# Patient Record
Sex: MALE | Race: WHITE | NOT HISPANIC OR LATINO | URBAN - METROPOLITAN AREA
[De-identification: names, ages, dates, MRNs, and addresses within clinical notes are randomized per-mention and may not be internally consistent; named-entity substitution may affect disease eponyms.]

---

## 2018-12-17 ENCOUNTER — INPATIENT (INPATIENT)
Facility: HOSPITAL | Age: 68
LOS: 2 days | Discharge: HOME | DRG: 554 | End: 2018-12-20
Attending: STUDENT IN AN ORGANIZED HEALTH CARE EDUCATION/TRAINING PROGRAM | Admitting: STUDENT IN AN ORGANIZED HEALTH CARE EDUCATION/TRAINING PROGRAM
Payer: MEDICAID

## 2018-12-17 VITALS
HEIGHT: 68 IN | RESPIRATION RATE: 20 BRPM | SYSTOLIC BLOOD PRESSURE: 160 MMHG | TEMPERATURE: 98 F | HEART RATE: 97 BPM | OXYGEN SATURATION: 99 % | WEIGHT: 201.06 LBS | DIASTOLIC BLOOD PRESSURE: 90 MMHG

## 2018-12-18 DIAGNOSIS — M10.9 GOUT, UNSPECIFIED: ICD-10-CM

## 2018-12-18 DIAGNOSIS — I10 ESSENTIAL (PRIMARY) HYPERTENSION: ICD-10-CM

## 2018-12-18 DIAGNOSIS — Z29.9 ENCOUNTER FOR PROPHYLACTIC MEASURES, UNSPECIFIED: ICD-10-CM

## 2018-12-18 DIAGNOSIS — E78.5 HYPERLIPIDEMIA, UNSPECIFIED: ICD-10-CM

## 2018-12-18 DIAGNOSIS — I25.10 ATHEROSCLEROTIC HEART DISEASE OF NATIVE CORONARY ARTERY WITHOUT ANGINA PECTORIS: ICD-10-CM

## 2018-12-18 LAB
24R-OH-CALCIDIOL SERPL-MCNC: 17 NG/ML — LOW (ref 30–80)
ALBUMIN SERPL ELPH-MCNC: 3.3 G/DL — LOW (ref 3.5–5)
ALBUMIN SERPL ELPH-MCNC: 3.5 G/DL — SIGNIFICANT CHANGE UP (ref 3.5–5)
ALP SERPL-CCNC: 84 U/L — SIGNIFICANT CHANGE UP (ref 40–120)
ALP SERPL-CCNC: 85 U/L — SIGNIFICANT CHANGE UP (ref 40–120)
ALT FLD-CCNC: 18 U/L DA — SIGNIFICANT CHANGE UP (ref 10–60)
ALT FLD-CCNC: 19 U/L DA — SIGNIFICANT CHANGE UP (ref 10–60)
ANION GAP SERPL CALC-SCNC: 8 MMOL/L — SIGNIFICANT CHANGE UP (ref 5–17)
ANION GAP SERPL CALC-SCNC: 9 MMOL/L — SIGNIFICANT CHANGE UP (ref 5–17)
AST SERPL-CCNC: 12 U/L — SIGNIFICANT CHANGE UP (ref 10–40)
AST SERPL-CCNC: 8 U/L — LOW (ref 10–40)
BASOPHILS # BLD AUTO: 0 K/UL — SIGNIFICANT CHANGE UP (ref 0–0.2)
BASOPHILS # BLD AUTO: 0 K/UL — SIGNIFICANT CHANGE UP (ref 0–0.2)
BASOPHILS NFR BLD AUTO: 0.3 % — SIGNIFICANT CHANGE UP (ref 0–2)
BASOPHILS NFR BLD AUTO: 0.4 % — SIGNIFICANT CHANGE UP (ref 0–2)
BILIRUB SERPL-MCNC: 2.1 MG/DL — HIGH (ref 0.2–1.2)
BILIRUB SERPL-MCNC: 2.3 MG/DL — HIGH (ref 0.2–1.2)
BUN SERPL-MCNC: 17 MG/DL — SIGNIFICANT CHANGE UP (ref 7–18)
BUN SERPL-MCNC: 19 MG/DL — HIGH (ref 7–18)
CALCIUM SERPL-MCNC: 8.7 MG/DL — SIGNIFICANT CHANGE UP (ref 8.4–10.5)
CALCIUM SERPL-MCNC: 9 MG/DL — SIGNIFICANT CHANGE UP (ref 8.4–10.5)
CHLORIDE SERPL-SCNC: 103 MMOL/L — SIGNIFICANT CHANGE UP (ref 96–108)
CHLORIDE SERPL-SCNC: 104 MMOL/L — SIGNIFICANT CHANGE UP (ref 96–108)
CHOLEST SERPL-MCNC: 153 MG/DL — SIGNIFICANT CHANGE UP (ref 10–199)
CO2 SERPL-SCNC: 24 MMOL/L — SIGNIFICANT CHANGE UP (ref 22–31)
CO2 SERPL-SCNC: 25 MMOL/L — SIGNIFICANT CHANGE UP (ref 22–31)
CREAT SERPL-MCNC: 1.04 MG/DL — SIGNIFICANT CHANGE UP (ref 0.5–1.3)
CREAT SERPL-MCNC: 1.05 MG/DL — SIGNIFICANT CHANGE UP (ref 0.5–1.3)
EOSINOPHIL # BLD AUTO: 0 K/UL — SIGNIFICANT CHANGE UP (ref 0–0.5)
EOSINOPHIL # BLD AUTO: 0.1 K/UL — SIGNIFICANT CHANGE UP (ref 0–0.5)
EOSINOPHIL NFR BLD AUTO: 0.4 % — SIGNIFICANT CHANGE UP (ref 0–6)
EOSINOPHIL NFR BLD AUTO: 0.7 % — SIGNIFICANT CHANGE UP (ref 0–6)
GLUCOSE SERPL-MCNC: 115 MG/DL — HIGH (ref 70–99)
GLUCOSE SERPL-MCNC: 126 MG/DL — HIGH (ref 70–99)
HBA1C BLD-MCNC: 5.4 % — SIGNIFICANT CHANGE UP (ref 4–5.6)
HCT VFR BLD CALC: 37.5 % — LOW (ref 39–50)
HCT VFR BLD CALC: 38 % — LOW (ref 39–50)
HDLC SERPL-MCNC: 50 MG/DL — SIGNIFICANT CHANGE UP
HGB BLD-MCNC: 12.3 G/DL — LOW (ref 13–17)
HGB BLD-MCNC: 12.5 G/DL — LOW (ref 13–17)
LIPID PNL WITH DIRECT LDL SERPL: 78 MG/DL — SIGNIFICANT CHANGE UP
LYMPHOCYTES # BLD AUTO: 0.8 K/UL — LOW (ref 1–3.3)
LYMPHOCYTES # BLD AUTO: 1.2 K/UL — SIGNIFICANT CHANGE UP (ref 1–3.3)
LYMPHOCYTES # BLD AUTO: 10.2 % — LOW (ref 13–44)
LYMPHOCYTES # BLD AUTO: 7.8 % — LOW (ref 13–44)
MAGNESIUM SERPL-MCNC: 2.4 MG/DL — SIGNIFICANT CHANGE UP (ref 1.6–2.6)
MCHC RBC-ENTMCNC: 26.6 PG — LOW (ref 27–34)
MCHC RBC-ENTMCNC: 26.9 PG — LOW (ref 27–34)
MCHC RBC-ENTMCNC: 32.7 GM/DL — SIGNIFICANT CHANGE UP (ref 32–36)
MCHC RBC-ENTMCNC: 32.9 GM/DL — SIGNIFICANT CHANGE UP (ref 32–36)
MCV RBC AUTO: 81.4 FL — SIGNIFICANT CHANGE UP (ref 80–100)
MCV RBC AUTO: 81.7 FL — SIGNIFICANT CHANGE UP (ref 80–100)
MONOCYTES # BLD AUTO: 0.5 K/UL — SIGNIFICANT CHANGE UP (ref 0–0.9)
MONOCYTES # BLD AUTO: 0.9 K/UL — SIGNIFICANT CHANGE UP (ref 0–0.9)
MONOCYTES NFR BLD AUTO: 5.2 % — SIGNIFICANT CHANGE UP (ref 2–14)
MONOCYTES NFR BLD AUTO: 7 % — SIGNIFICANT CHANGE UP (ref 2–14)
NEUTROPHILS # BLD AUTO: 10 K/UL — HIGH (ref 1.8–7.4)
NEUTROPHILS # BLD AUTO: 8.6 K/UL — HIGH (ref 1.8–7.4)
NEUTROPHILS NFR BLD AUTO: 81.7 % — HIGH (ref 43–77)
NEUTROPHILS NFR BLD AUTO: 86.3 % — HIGH (ref 43–77)
PHOSPHATE SERPL-MCNC: 4.9 MG/DL — HIGH (ref 2.5–4.5)
PLATELET # BLD AUTO: 303 K/UL — SIGNIFICANT CHANGE UP (ref 150–400)
PLATELET # BLD AUTO: 328 K/UL — SIGNIFICANT CHANGE UP (ref 150–400)
POTASSIUM SERPL-MCNC: 4 MMOL/L — SIGNIFICANT CHANGE UP (ref 3.5–5.3)
POTASSIUM SERPL-MCNC: 4.1 MMOL/L — SIGNIFICANT CHANGE UP (ref 3.5–5.3)
POTASSIUM SERPL-SCNC: 4 MMOL/L — SIGNIFICANT CHANGE UP (ref 3.5–5.3)
POTASSIUM SERPL-SCNC: 4.1 MMOL/L — SIGNIFICANT CHANGE UP (ref 3.5–5.3)
PROT SERPL-MCNC: 7.8 G/DL — SIGNIFICANT CHANGE UP (ref 6–8.3)
PROT SERPL-MCNC: 8.2 G/DL — SIGNIFICANT CHANGE UP (ref 6–8.3)
RBC # BLD: 4.62 M/UL — SIGNIFICANT CHANGE UP (ref 4.2–5.8)
RBC # BLD: 4.65 M/UL — SIGNIFICANT CHANGE UP (ref 4.2–5.8)
RBC # FLD: 14 % — SIGNIFICANT CHANGE UP (ref 10.3–14.5)
RBC # FLD: 14.1 % — SIGNIFICANT CHANGE UP (ref 10.3–14.5)
SODIUM SERPL-SCNC: 136 MMOL/L — SIGNIFICANT CHANGE UP (ref 135–145)
SODIUM SERPL-SCNC: 137 MMOL/L — SIGNIFICANT CHANGE UP (ref 135–145)
TOTAL CHOLESTEROL/HDL RATIO MEASUREMENT: 3.1 RATIO — LOW (ref 3.4–9.6)
TRIGL SERPL-MCNC: 123 MG/DL — SIGNIFICANT CHANGE UP (ref 10–149)
TSH SERPL-MCNC: 3.39 UU/ML — SIGNIFICANT CHANGE UP (ref 0.34–4.82)
URATE SERPL-MCNC: 9 MG/DL — HIGH (ref 3.4–8.8)
VIT B12 SERPL-MCNC: 463 PG/ML — SIGNIFICANT CHANGE UP (ref 232–1245)
WBC # BLD: 10 K/UL — SIGNIFICANT CHANGE UP (ref 3.8–10.5)
WBC # BLD: 12.3 K/UL — HIGH (ref 3.8–10.5)
WBC # FLD AUTO: 10 K/UL — SIGNIFICANT CHANGE UP (ref 3.8–10.5)
WBC # FLD AUTO: 12.3 K/UL — HIGH (ref 3.8–10.5)

## 2018-12-18 PROCEDURE — 99223 1ST HOSP IP/OBS HIGH 75: CPT | Mod: GC

## 2018-12-18 PROCEDURE — 73562 X-RAY EXAM OF KNEE 3: CPT | Mod: 26,LT

## 2018-12-18 PROCEDURE — 73620 X-RAY EXAM OF FOOT: CPT | Mod: 26,LT

## 2018-12-18 PROCEDURE — 99285 EMERGENCY DEPT VISIT HI MDM: CPT | Mod: 25

## 2018-12-18 RX ORDER — LIDOCAINE HCL 20 MG/ML
5 VIAL (ML) INJECTION ONCE
Qty: 0 | Refills: 0 | Status: COMPLETED | OUTPATIENT
Start: 2018-12-18 | End: 2018-12-18

## 2018-12-18 RX ORDER — SODIUM CHLORIDE 9 MG/ML
3 INJECTION INTRAMUSCULAR; INTRAVENOUS; SUBCUTANEOUS ONCE
Qty: 0 | Refills: 0 | Status: COMPLETED | OUTPATIENT
Start: 2018-12-18 | End: 2018-12-18

## 2018-12-18 RX ORDER — ASPIRIN/CALCIUM CARB/MAGNESIUM 324 MG
81 TABLET ORAL DAILY
Qty: 0 | Refills: 0 | Status: DISCONTINUED | OUTPATIENT
Start: 2018-12-18 | End: 2018-12-20

## 2018-12-18 RX ORDER — COLCHICINE 0.6 MG
0.6 TABLET ORAL ONCE
Qty: 0 | Refills: 0 | Status: COMPLETED | OUTPATIENT
Start: 2018-12-18 | End: 2018-12-18

## 2018-12-18 RX ORDER — MORPHINE SULFATE 50 MG/1
4 CAPSULE, EXTENDED RELEASE ORAL ONCE
Qty: 0 | Refills: 0 | Status: DISCONTINUED | OUTPATIENT
Start: 2018-12-18 | End: 2018-12-18

## 2018-12-18 RX ORDER — ATORVASTATIN CALCIUM 80 MG/1
40 TABLET, FILM COATED ORAL AT BEDTIME
Qty: 0 | Refills: 0 | Status: DISCONTINUED | OUTPATIENT
Start: 2018-12-18 | End: 2018-12-20

## 2018-12-18 RX ORDER — ENOXAPARIN SODIUM 100 MG/ML
40 INJECTION SUBCUTANEOUS DAILY
Qty: 0 | Refills: 0 | Status: DISCONTINUED | OUTPATIENT
Start: 2018-12-18 | End: 2018-12-20

## 2018-12-18 RX ORDER — COLCHICINE 0.6 MG
0.6 TABLET ORAL EVERY 12 HOURS
Qty: 0 | Refills: 0 | Status: DISCONTINUED | OUTPATIENT
Start: 2018-12-19 | End: 2018-12-20

## 2018-12-18 RX ORDER — TRAMADOL HYDROCHLORIDE 50 MG/1
50 TABLET ORAL ONCE
Qty: 0 | Refills: 0 | Status: DISCONTINUED | OUTPATIENT
Start: 2018-12-18 | End: 2018-12-18

## 2018-12-18 RX ORDER — KETOROLAC TROMETHAMINE 30 MG/ML
15 SYRINGE (ML) INJECTION ONCE
Qty: 0 | Refills: 0 | Status: DISCONTINUED | OUTPATIENT
Start: 2018-12-18 | End: 2018-12-18

## 2018-12-18 RX ORDER — COLCHICINE 0.6 MG
1.2 TABLET ORAL ONCE
Qty: 0 | Refills: 0 | Status: COMPLETED | OUTPATIENT
Start: 2018-12-18 | End: 2018-12-18

## 2018-12-18 RX ADMIN — Medication 5 MILLILITER(S): at 02:53

## 2018-12-18 RX ADMIN — Medication 15 MILLIGRAM(S): at 04:52

## 2018-12-18 RX ADMIN — Medication 0.6 MILLIGRAM(S): at 16:53

## 2018-12-18 RX ADMIN — ATORVASTATIN CALCIUM 40 MILLIGRAM(S): 80 TABLET, FILM COATED ORAL at 21:47

## 2018-12-18 RX ADMIN — Medication 40 MILLIGRAM(S): at 05:09

## 2018-12-18 RX ADMIN — SODIUM CHLORIDE 3 MILLILITER(S): 9 INJECTION INTRAMUSCULAR; INTRAVENOUS; SUBCUTANEOUS at 02:20

## 2018-12-18 RX ADMIN — TRAMADOL HYDROCHLORIDE 50 MILLIGRAM(S): 50 TABLET ORAL at 02:21

## 2018-12-18 RX ADMIN — TRAMADOL HYDROCHLORIDE 50 MILLIGRAM(S): 50 TABLET ORAL at 04:52

## 2018-12-18 RX ADMIN — MORPHINE SULFATE 4 MILLIGRAM(S): 50 CAPSULE, EXTENDED RELEASE ORAL at 05:09

## 2018-12-18 RX ADMIN — Medication 1.2 MILLIGRAM(S): at 15:26

## 2018-12-18 RX ADMIN — Medication 40 MILLIGRAM(S): at 14:53

## 2018-12-18 RX ADMIN — MORPHINE SULFATE 4 MILLIGRAM(S): 50 CAPSULE, EXTENDED RELEASE ORAL at 06:24

## 2018-12-18 RX ADMIN — Medication 15 MILLIGRAM(S): at 02:21

## 2018-12-18 NOTE — ED PROVIDER NOTE - OBJECTIVE STATEMENT
67 y/o M w/ PMHx of HTN, HLD, gout presents to ED c/o 2 weeks of L knee and L foot pain, associated w/ swelling. Pt notes he ran out of Colchicine 2 weeks ago and pain has gotten worse since, prompting ED visit. Pt denies any trauma.

## 2018-12-18 NOTE — H&P ADULT - NSHPLABSRESULTS_GEN_ALL_CORE
12.5   10.0  )-----------( 303      ( 18 Dec 2018 10:34 )             38.0       12-18    137  |  103  |  19<H>  ----------------------------<  126<H>  4.1   |  25  |  1.04    Ca    9.0      18 Dec 2018 10:34  Phos  4.9     12-18  Mg     2.4     12-18    TPro  8.2  /  Alb  3.5  /  TBili  2.1<H>  /  DBili  x   /  AST  8<L>  /  ALT  18  /  AlkPhos  84  12-18      < from: Xray Foot AP + Lateral, Left (12.18.18 @ 03:32) >      AP and lateral radiographs of the left foot were obtained.    There are degenerative changes about the talonavicular joint with   subchondral irregularity and osteophyte formation.    There is no fracture, subluxation or dislocation. No lytic or blastic   lesions are identified.    Thereis no periosteal reaction or cortical disruption to suggest   osteomyelitis.    There is diffuse osteopenia.    Diffuse soft tissue swelling is present.    IMPRESSION:    Osteoarthritis of the hindfoot. Diffuse soft tissue swelling.    Osteopenia.    < end of copied text >      < from: Xray Knee 3 Views, Left (12.18.18 @ 03:31) >    Indication: Left knee pain and swelling.    3 views of the left knee are acquired without a previous examination for   comparison.    Impression: No evidence for an acute fracture or dislocation.    The joint spaces are preserved.    The osseous mineralization is within normal limits.     Mild osteoarthritis at the patellofemoral joint.    Small suprapatellar joint effusion.    Enthesophyte at the superior patella.    < end of copied text >

## 2018-12-18 NOTE — ED PROVIDER NOTE - PROGRESS NOTE DETAILS
labs show wbc 12K, uric acid 9, Xrs show no acute fractures.  Discussed above with patient using Micron[place]  ID#[id number], on reeval pt reports pain persistent and still unable to bear weight on it. Will admit for pain management and ortho evaluation.

## 2018-12-18 NOTE — H&P ADULT - ATTENDING COMMENTS
Agree with above   Patient seen and examined in ED together with Dr. Thompson PGY2. Patient is [place] speaking, I spoke to him in [place]   Patient is  a 68 male. recently moved to [place] from [place],  with PMH of CAD, HTN , HLD , Gout  comes in with complaint of Left knee pain and swelling for 10 days. Patient basically had swelling and pain involving knees and foot of both legs , but symptoms were primarily in Left knee. Patient has a h/o Gout and is on colchicine for years , however ran out of medication about 2 weeks ago. Denies any fever , chills , nausea , vomiting , diarrhea , headache or any other ROS.     ROS: pain and swelling of right knee   PE:   General: laying in bed, in mild distress secondary to pain   Cardio: regular rate and rhythm   Pulm: clear breat sounds   GI: abdomen is soft   Extr: right knee is swollen and tender to touch, b/l LE edema   Neuro: AOx3, no focal weakness     Vital Signs Last 24 Hrs  T(C): 36.6 (18 Dec 2018 16:29), Max: 36.8 (17 Dec 2018 22:46)  T(F): 97.8 (18 Dec 2018 16:29), Max: 98.3 (17 Dec 2018 22:46)  HR: 82 (18 Dec 2018 16:29) (74 - 97)  BP: 152/70 (18 Dec 2018 16:29) (130/64 - 160/90)  BP(mean): --  RR: 18 (18 Dec 2018 16:29) (18 - 20)  SpO2: 97% (18 Dec 2018 16:29) (97% - 100%)    1. Acute gouty attack   2. CAD  3. HTN  4. HLD  5. DVT prophylaxis     1. Restarted Colchiceine; give one dose of 1.2 mg po followed by 0.6 mg po twice daily   s/p Arthrocentesis in ED. IF results of fluid analysis confirms dx. of gout, will give intraarticular steroids.   2. Start Aspirin and statin: Atorvastatin 40 mg po daily   3. BP currently controlled: c/w monitoring, if SBP >140, can start Lisinopril 5 mg po daily   4. DVT prophylaxis     Plan of care discussed with patient in detail. Agree with above   Patient seen and examined in ED together with Dr. Thompson PGY2. Patient is [place] speaking, I spoke to him in [place]   Patient is  a 68 male. recently moved to [place] from [place],  with PMH of CAD, HTN , HLD , Gout  comes in with complaint of Left knee pain and swelling for 10 days. Patient basically had swelling and pain involving knees and foot of both legs , but symptoms were primarily in Left knee. Patient has a h/o Gout and is on colchicine for years , however ran out of medication about 2 weeks ago. Denies any fever , chills , nausea , vomiting , diarrhea , headache or any other ROS.     ROS: pain and swelling of right knee   PE:   General: laying in bed, in mild distress secondary to pain   Cardio: regular rate and rhythm   Pulm: clear breat sounds   GI: abdomen is soft   Extr: right knee is swollen and tender to touch, b/l LE edema   Neuro: AOx3, no focal weakness     Vital Signs Last 24 Hrs  T(C): 36.6 (18 Dec 2018 16:29), Max: 36.8 (17 Dec 2018 22:46)  T(F): 97.8 (18 Dec 2018 16:29), Max: 98.3 (17 Dec 2018 22:46)  HR: 82 (18 Dec 2018 16:29) (74 - 97)  BP: 152/70 (18 Dec 2018 16:29) (130/64 - 160/90)  BP(mean): --  RR: 18 (18 Dec 2018 16:29) (18 - 20)  SpO2: 97% (18 Dec 2018 16:29) (97% - 100%)    1. Acute gouty attack   2. CAD  3. HTN  4. HLD  5. DVT prophylaxis     1. Restarted Colchiceine; give one dose of 1.2 mg po followed by 0.6 mg po twice daily   s/p Arthrocentesis in ED. IF results of fluid analysis confirms dx. of gout, will give intraarticular steroids.   2. Start Aspirin and statin: Atorvastatin 40 mg po daily   3. BP currently controlled: c/w monitoring, if SBP >140, can start Lisinopril 5 mg po daily   4. DVT prophylaxis     Plan of care discussed with patient in detail.      Addendum 12/19/18.   The left knee is swollen, not right

## 2018-12-18 NOTE — H&P ADULT - PROBLEM SELECTOR PLAN 2
Not on meds at home   c/w dash diet   Monitor BP Not on meds at home , takes prn meds  c/w dash diet   Monitor BP , if elevated consider Ace/ arb

## 2018-12-18 NOTE — H&P ADULT - ASSESSMENT
68 male with PMH of HTN , HLD , Gout , CAD , comes in with complaint of Left knee pain and swelling for 10 days.   Will admit to medicine for Gout.

## 2018-12-18 NOTE — ED ADULT NURSE NOTE - NSIMPLEMENTINTERV_GEN_ALL_ED
Implemented All Universal Safety Interventions:  [place] to call system. Call bell, personal items and telephone within reach. Instruct patient to call for assistance. Room bathroom lighting operational. Non-slip footwear when patient is off stretcher. Physically safe environment: no spills, clutter or unnecessary equipment. Stretcher in lowest position, wheels locked, appropriate side rails in place.

## 2018-12-18 NOTE — CONSULT NOTE ADULT - SUBJECTIVE AND OBJECTIVE BOX
HPI: Patient is a 69 y/o M with history of Gout who presents to ED c/o 2 weeks of L knee and L foot pain, associated w/ swelling. Pt notes he ran out of Colchicine 2 weeks ago and pain has gotten worse since, prompting ED visit.    PAST MEDICAL & SURGICAL HISTORY:  No pertinent past medical history  No significant past surgical history    Review of systems: Non Contributory    MEDICATIONS  (STANDING):    Allergies: No known Allergies    Vital Signs Last 24 Hrs  T(C): 36.3 (18 Dec 2018 08:15), Max: 36.8 (17 Dec 2018 22:46)  T(F): 97.4 (18 Dec 2018 08:15), Max: 98.3 (17 Dec 2018 22:46)  HR: 80 (18 Dec 2018 08:15) (80 - 97)  BP: 144/72 (18 Dec 2018 08:15) (130/64 - 160/90)  BP(mean): --  RR: 18 (18 Dec 2018 08:15) (18 - 20)  SpO2: 98% (18 Dec 2018 08:15) (98% - 99%)    Physical Examination:    Musculoskeletal: Left knee: Positive tenderness to palpation of joint line, mild effusion, pain on flexion of the knee, decreased range of motion, positive [facility] maneuver.      Left foot: mild diffuse erythema, swelling, pain to palpation.     Neurovascularly Intact    LABS:                        12.3   12.3  )-----------( 328      ( 18 Dec 2018 03:03 )             37.5     12-18    136  |  104  |  17  ----------------------------<  115<H>  4.0   |  24  |  1.05    Ca    8.7      18 Dec 2018 03:03    TPro  7.8  /  Alb  3.3<L>  /  TBili  2.3<H>  /  DBili  x   /  AST  12  /  ALT  19  /  AlkPhos  85  12-18    RADIOLOGY & ADDITIONAL STUDIES: X-ray of Left knee done in the ER is unremarkable.    X ray of left foot done in er shows degenerative changes. ,    ASSESSMENT: Left foot and left knee pain, history of Gout.    PLAN/RECOMMENDATION: Under sterile condition, left knee was injected with 40 mg of Depomedrol and 5 CC of lidocaine. Patient tolerated procedure well and expressed relief of pain.     Medical treatment for gout.     Apply ice. Take anti-inflammatory medication. Apply Voltaren gel.     FOLLOW UP: With office in after discharge

## 2018-12-18 NOTE — H&P ADULT - PROBLEM SELECTOR PLAN 1
Left knee pain and swelling mostly consistent with gouty arthritis based on symptoms   s/p one dose of ketorolac Left knee pain and swelling mostly consistent with gouty arthritis based on symptoms   s/p one dose of ketorolac , prednisone in Ed  s/p Arthrocentesis in Ed by Ortho , Dr. Alas   will f/u fluid studies  will treat for gout with colchicine   NSAIDs for pain control as needed   If confirmed gout on fluid study can consider intraarticular steroid   PT   Dvt ppx

## 2018-12-18 NOTE — H&P ADULT - NSHPPHYSICALEXAM_GEN_ALL_CORE
PHYSICAL EXAM:  GENERAL: NAD  HEENT: Normocephalic;  conjunctivae and sclerae clear; moist mucous membranes;   NECK : supple  CHEST/LUNG: Clear to auscultation bilaterally with good air entry   HEART: S1 S2  regular; no murmurs, gallops or rubs  ABDOMEN: Soft, Nontender, Nondistended; Bowel sounds present  EXTREMITIES: Left knee swollen , mild tenderness ; swelling in Left foot as well ; Right sided joints WNL  SKIN: warm and dry; no rash  NERVOUS SYSTEM:  Awake and alert; Oriented  to place, person and time ; no new deficits

## 2018-12-18 NOTE — H&P ADULT - HISTORY OF PRESENT ILLNESS
68 male with PMH of HTN , HLD , Gout , CAD , comes in with complaint of Left knee pain and swelling for 10 days. Patient basically had swelling and pain involving knees and foot of both legs , but symptoms were primarily in Left knee. Patient has a h/o Gout and is on colchicine for years , however ran out of medication about 2 weeks ago. Denies any fever , chills , nausea , vomiting , diarrhea , headache or any other ROS.     In Ed , BP : 160/90 mm hg , Hr: 97 , Temp : 98.3 F   Cbc shows white count of 12 with left shift that normalized to 10   Bmp WNL  Uric acid 9   Xray shows OA , with pre-patellar and soft tissue swelling ; no fractures

## 2018-12-18 NOTE — ED PROVIDER NOTE - MEDICAL DECISION MAKING DETAILS
69 y/o M w/ h/o gout here w/ L knee swelling and pain. Likely gouty flare. Will obtain x-rays, given tramadol and Toradol, will re-assess.

## 2018-12-18 NOTE — H&P ADULT - PROBLEM SELECTOR PLAN 4
c/w home medications Daughter to bring in home medications , primary team to follow  Will start on asa, statin for now

## 2018-12-19 ENCOUNTER — TRANSCRIPTION ENCOUNTER (OUTPATIENT)
Age: 68
End: 2018-12-19

## 2018-12-19 LAB
ALBUMIN SERPL ELPH-MCNC: 3.2 G/DL — LOW (ref 3.5–5)
ALP SERPL-CCNC: 82 U/L — SIGNIFICANT CHANGE UP (ref 40–120)
ALT FLD-CCNC: 19 U/L DA — SIGNIFICANT CHANGE UP (ref 10–60)
ANION GAP SERPL CALC-SCNC: 10 MMOL/L — SIGNIFICANT CHANGE UP (ref 5–17)
AST SERPL-CCNC: 9 U/L — LOW (ref 10–40)
BASOPHILS # BLD AUTO: 0 K/UL — SIGNIFICANT CHANGE UP (ref 0–0.2)
BASOPHILS NFR BLD AUTO: 0.2 % — SIGNIFICANT CHANGE UP (ref 0–2)
BILIRUB SERPL-MCNC: 1.3 MG/DL — HIGH (ref 0.2–1.2)
BUN SERPL-MCNC: 25 MG/DL — HIGH (ref 7–18)
CALCIUM SERPL-MCNC: 9.1 MG/DL — SIGNIFICANT CHANGE UP (ref 8.4–10.5)
CHLORIDE SERPL-SCNC: 105 MMOL/L — SIGNIFICANT CHANGE UP (ref 96–108)
CO2 SERPL-SCNC: 25 MMOL/L — SIGNIFICANT CHANGE UP (ref 22–31)
CREAT SERPL-MCNC: 0.98 MG/DL — SIGNIFICANT CHANGE UP (ref 0.5–1.3)
EOSINOPHIL # BLD AUTO: 0 K/UL — SIGNIFICANT CHANGE UP (ref 0–0.5)
EOSINOPHIL NFR BLD AUTO: 0.1 % — SIGNIFICANT CHANGE UP (ref 0–6)
GLUCOSE SERPL-MCNC: 131 MG/DL — HIGH (ref 70–99)
HCT VFR BLD CALC: 39.2 % — SIGNIFICANT CHANGE UP (ref 39–50)
HGB BLD-MCNC: 12.5 G/DL — LOW (ref 13–17)
LYMPHOCYTES # BLD AUTO: 1.1 K/UL — SIGNIFICANT CHANGE UP (ref 1–3.3)
LYMPHOCYTES # BLD AUTO: 9.5 % — LOW (ref 13–44)
MAGNESIUM SERPL-MCNC: 2.6 MG/DL — SIGNIFICANT CHANGE UP (ref 1.6–2.6)
MCHC RBC-ENTMCNC: 26.4 PG — LOW (ref 27–34)
MCHC RBC-ENTMCNC: 31.9 GM/DL — LOW (ref 32–36)
MCV RBC AUTO: 82.7 FL — SIGNIFICANT CHANGE UP (ref 80–100)
MONOCYTES # BLD AUTO: 0.9 K/UL — SIGNIFICANT CHANGE UP (ref 0–0.9)
MONOCYTES NFR BLD AUTO: 7.2 % — SIGNIFICANT CHANGE UP (ref 2–14)
NEUTROPHILS # BLD AUTO: 10 K/UL — HIGH (ref 1.8–7.4)
NEUTROPHILS NFR BLD AUTO: 82.9 % — HIGH (ref 43–77)
PHOSPHATE SERPL-MCNC: 4.6 MG/DL — HIGH (ref 2.5–4.5)
PLATELET # BLD AUTO: 399 K/UL — SIGNIFICANT CHANGE UP (ref 150–400)
POTASSIUM SERPL-MCNC: 4.2 MMOL/L — SIGNIFICANT CHANGE UP (ref 3.5–5.3)
POTASSIUM SERPL-SCNC: 4.2 MMOL/L — SIGNIFICANT CHANGE UP (ref 3.5–5.3)
PROT SERPL-MCNC: 8 G/DL — SIGNIFICANT CHANGE UP (ref 6–8.3)
RBC # BLD: 4.74 M/UL — SIGNIFICANT CHANGE UP (ref 4.2–5.8)
RBC # FLD: 13.9 % — SIGNIFICANT CHANGE UP (ref 10.3–14.5)
SODIUM SERPL-SCNC: 140 MMOL/L — SIGNIFICANT CHANGE UP (ref 135–145)
WBC # BLD: 12.1 K/UL — HIGH (ref 3.8–10.5)
WBC # FLD AUTO: 12.1 K/UL — HIGH (ref 3.8–10.5)

## 2018-12-19 PROCEDURE — 99233 SBSQ HOSP IP/OBS HIGH 50: CPT | Mod: GC

## 2018-12-19 PROCEDURE — 93971 EXTREMITY STUDY: CPT | Mod: 26,LT

## 2018-12-19 RX ORDER — ASPIRIN/CALCIUM CARB/MAGNESIUM 324 MG
1 TABLET ORAL
Qty: 0 | Refills: 0 | COMMUNITY
Start: 2018-12-19

## 2018-12-19 RX ORDER — COLCHICINE 0.6 MG
1 TABLET ORAL
Qty: 60 | Refills: 0 | OUTPATIENT
Start: 2018-12-19 | End: 2019-01-17

## 2018-12-19 RX ORDER — ATORVASTATIN CALCIUM 80 MG/1
1 TABLET, FILM COATED ORAL
Qty: 0 | Refills: 0 | COMMUNITY
Start: 2018-12-19

## 2018-12-19 RX ORDER — COLCHICINE 0.6 MG
1 TABLET ORAL
Qty: 0 | Refills: 0 | COMMUNITY
Start: 2018-12-19

## 2018-12-19 RX ORDER — ACETAMINOPHEN 500 MG
650 TABLET ORAL EVERY 6 HOURS
Qty: 0 | Refills: 0 | Status: DISCONTINUED | OUTPATIENT
Start: 2018-12-19 | End: 2018-12-20

## 2018-12-19 RX ORDER — CLOPIDOGREL BISULFATE 75 MG/1
75 TABLET, FILM COATED ORAL DAILY
Qty: 0 | Refills: 0 | Status: DISCONTINUED | OUTPATIENT
Start: 2018-12-19 | End: 2018-12-20

## 2018-12-19 RX ORDER — CLOPIDOGREL BISULFATE 75 MG/1
1 TABLET, FILM COATED ORAL
Qty: 0 | Refills: 0 | COMMUNITY
Start: 2018-12-19

## 2018-12-19 RX ORDER — ERGOCALCIFEROL 1.25 MG/1
50000 CAPSULE ORAL
Qty: 0 | Refills: 0 | Status: DISCONTINUED | OUTPATIENT
Start: 2018-12-19 | End: 2018-12-20

## 2018-12-19 RX ORDER — ASPIRIN/CALCIUM CARB/MAGNESIUM 324 MG
1 TABLET ORAL
Qty: 30 | Refills: 0 | OUTPATIENT
Start: 2018-12-19 | End: 2019-01-17

## 2018-12-19 RX ORDER — ATORVASTATIN CALCIUM 80 MG/1
1 TABLET, FILM COATED ORAL
Qty: 30 | Refills: 0 | OUTPATIENT
Start: 2018-12-19 | End: 2019-01-17

## 2018-12-19 RX ORDER — CLOPIDOGREL BISULFATE 75 MG/1
1 TABLET, FILM COATED ORAL
Qty: 30 | Refills: 0 | OUTPATIENT
Start: 2018-12-19 | End: 2019-01-17

## 2018-12-19 RX ADMIN — Medication 0.6 MILLIGRAM(S): at 05:26

## 2018-12-19 RX ADMIN — Medication 40 MILLIGRAM(S): at 21:29

## 2018-12-19 RX ADMIN — ENOXAPARIN SODIUM 40 MILLIGRAM(S): 100 INJECTION SUBCUTANEOUS at 13:34

## 2018-12-19 RX ADMIN — ERGOCALCIFEROL 50000 UNIT(S): 1.25 CAPSULE ORAL at 17:39

## 2018-12-19 RX ADMIN — Medication 81 MILLIGRAM(S): at 13:33

## 2018-12-19 RX ADMIN — Medication 2.5 MILLIGRAM(S): at 21:30

## 2018-12-19 RX ADMIN — ATORVASTATIN CALCIUM 40 MILLIGRAM(S): 80 TABLET, FILM COATED ORAL at 21:29

## 2018-12-19 RX ADMIN — Medication 0.6 MILLIGRAM(S): at 17:39

## 2018-12-19 RX ADMIN — Medication 650 MILLIGRAM(S): at 19:00

## 2018-12-19 RX ADMIN — CLOPIDOGREL BISULFATE 75 MILLIGRAM(S): 75 TABLET, FILM COATED ORAL at 17:39

## 2018-12-19 RX ADMIN — Medication 650 MILLIGRAM(S): at 17:46

## 2018-12-19 RX ADMIN — Medication 500 MILLIGRAM(S): at 00:38

## 2018-12-19 RX ADMIN — Medication 500 MILLIGRAM(S): at 01:21

## 2018-12-19 RX ADMIN — Medication 40 MILLIGRAM(S): at 15:24

## 2018-12-19 NOTE — DISCHARGE NOTE ADULT - PATIENT PORTAL LINK FT
You can access the [facility] Portal, offered by [facility], by registering with the following website: [email or web address]

## 2018-12-19 NOTE — DISCHARGE NOTE ADULT - PROVIDER TOKENS
FREE:[LAST:[[facility]],PHONE:[phone]],FAX:[(   [phone]    -],ADDRESS:[[address] [place], [place]]]

## 2018-12-19 NOTE — DISCHARGE NOTE ADULT - CARE PROVIDER_API CALL
[facility],   [address] [place], [place]  Phone: [phone]  Fax: (   )    -

## 2018-12-19 NOTE — PHYSICAL THERAPY INITIAL EVALUATION ADULT - ACTIVE RANGE OF MOTION EXAMINATION, REHAB EVAL
bilateral  lower extremity Active ROM was WFL (within functional limits)/bilateral upper extremity Active ROM was WFL (within functional limits)/With c/o pain in Left knee movements

## 2018-12-19 NOTE — PROGRESS NOTE ADULT - SUBJECTIVE AND OBJECTIVE BOX
NP Note discussed with  Primary Attending    Patient is a 68y old  Male who presents with a chief complaint of Left knee pain (18 Dec 2018 14:17)    68 male with PMH of HTN , HLD , Gout , CAD , comes in with complaint of Left knee pain and swelling for 10 days. Patient basically had swelling and pain involving knees and foot of both legs , but symptoms were primarily in Left knee. Patient has a h/o Gout and is on colchicine for years , however ran out of medication about 2 weeks ago. Denies any fever , chills , nausea , vomiting , diarrhea , headache or any other ROS.     INTERVAL HPI/OVERNIGHT EVENTS: no new complaints    MEDICATIONS  (STANDING):  aspirin enteric coated 81 milliGRAM(s) Oral daily  atorvastatin 40 milliGRAM(s) Oral at bedtime  colchicine 0.6 milliGRAM(s) Oral every 12 hours  enoxaparin Injectable 40 milliGRAM(s) SubCutaneous daily    MEDICATIONS  (PRN):  _________________________________  REVIEW OF SYSTEMS:    CONSTITUTIONAL: No fever,   EYES: no acute visual disturbances  NECK: No pain or stiffness  RESPIRATORY: No cough; No shortness of breath  CARDIOVASCULAR: No chest pain, no palpitations  GASTROINTESTINAL: No pain. No nausea or vomiting; No diarrhea   NEUROLOGICAL: No headache or numbness, no tremors  MUSCULOSKELETAL: No joint pain, no muscle pain  GENITOURINARY: no dysuria, no frequency, no hesitancy  PSYCHIATRY: no depression , no anxiety  ALL OTHER  ROS negative      Vital Signs Last 24 Hrs  T(C): 36.4 (19 Dec 2018 05:00), Max: 36.6 (18 Dec 2018 16:29)  T(F): 97.6 (19 Dec 2018 05:00), Max: 97.8 (18 Dec 2018 16:29)  HR: 90 (19 Dec 2018 10:15) (79 - 104)  BP: 132/65 (19 Dec 2018 05:00) (132/65 - 152/70)  BP(mean): --  RR: 15 (19 Dec 2018 05:00) (15 - 18)  SpO2: 99% (19 Dec 2018 10:15) (95% - 99%)    ________________________________________________  PHYSICAL EXAM:  GENERAL: NAD  HEENT: Normocephalic;  conjunctivae and sclerae clear; moist mucous membranes;   NECK : supple  CHEST/LUNG: Clear to auscultation bilaterally with good air entry   HEART: S1 S2  regular; no murmurs, gallops or rubs  ABDOMEN: Soft, Nontender, Nondistended; Bowel sounds present  EXTREMITIES: left knee edematous, no calf tenderness  SKIN: warm and dry; no rash  NERVOUS SYSTEM:  Awake and alert; Oriented  to place, person and time ; no new deficits    _________________________________________________  LABS:                        12.5   12.1  )-----------( 399      ( 19 Dec 2018 07:46 )             39.2     12-19    140  |  105  |  25<H>  ----------------------------<  131<H>  4.2   |  25  |  0.98    Ca    9.1      19 Dec 2018 07:46  Phos  4.6     12-19  Mg     2.6     12-19    TPro  8.0  /  Alb  3.2<L>  /  TBili  1.3<H>  /  DBili  x   /  AST  9<L>  /  ALT  19  /  AlkPhos  82  12-19        CAPILLARY BLOOD GLUCOSE      RADIOLOGY & ADDITIONAL TESTS:    Imaging  Reviewed:  YES    Consultant(s) Notes Reviewed:   YES    Plan of care was discussed with patient and /or primary care giver; all questions and concerns were addressed NP Note discussed with  Primary Attending    Patient is a 68y old  Male who presents with a chief complaint of Left knee pain (18 Dec 2018 14:17)    68 male with PMH of HTN , HLD , Gout , CAD , comes in with complaint of Left knee pain and swelling for 10 days. Patient basically had swelling and pain involving knees and foot of both legs , but symptoms were primarily in Left knee. Patient has a h/o Gout and is on colchicine for years , however ran out of medication about 2 weeks ago. Denies any fever , chills , nausea , vomiting , diarrhea , headache or any other ROS.     INTERVAL HPI/OVERNIGHT EVENTS: pain improved since yesterday but still 8/10     MEDICATIONS  (STANDING):  aspirin enteric coated 81 milliGRAM(s) Oral daily  atorvastatin 40 milliGRAM(s) Oral at bedtime  colchicine 0.6 milliGRAM(s) Oral every 12 hours  enoxaparin Injectable 40 milliGRAM(s) SubCutaneous daily    MEDICATIONS  (PRN):  _________________________________  REVIEW OF SYSTEMS:    CONSTITUTIONAL: No fever,   EYES: no acute visual disturbances  NECK: No pain or stiffness  RESPIRATORY: No cough; No shortness of breath  CARDIOVASCULAR: No chest pain, no palpitations  GASTROINTESTINAL: No pain. No nausea or vomiting; No diarrhea   NEUROLOGICAL: No headache or numbness, no tremors  MUSCULOSKELETAL: bilateral knee pain L>R, with pain radiating to L calf and foot   GENITOURINARY: no dysuria, no frequency, no hesitancy  PSYCHIATRY: no depression , no anxiety  ALL OTHER  ROS negative      Vital Signs Last 24 Hrs  T(C): 36.4 (19 Dec 2018 05:00), Max: 36.6 (18 Dec 2018 16:29)  T(F): 97.6 (19 Dec 2018 05:00), Max: 97.8 (18 Dec 2018 16:29)  HR: 90 (19 Dec 2018 10:15) (79 - 104)  BP: 132/65 (19 Dec 2018 05:00) (132/65 - 152/70)  BP(mean): --  RR: 15 (19 Dec 2018 05:00) (15 - 18)  SpO2: 99% (19 Dec 2018 10:15) (95% - 99%)    ________________________________________________  PHYSICAL EXAM:  GENERAL: NAD  HEENT: Normocephalic;  conjunctivae and sclerae clear; moist mucous membranes;   NECK : supple  CHEST/LUNG: Clear to auscultation bilaterally with good air entry   HEART: S1 S2  regular; no murmurs, gallops or rubs  ABDOMEN: Soft, Nontender, Nondistended; Bowel sounds present  EXTREMITIES: left knee edematous, left calf tenderness  SKIN: warm and dry; no rash  NERVOUS SYSTEM:  Awake and alert; Oriented  to place, person and time ; no new deficits    _________________________________________________  LABS:                        12.5   12.1  )-----------( 399      ( 19 Dec 2018 07:46 )             39.2     12-19    140  |  105  |  25<H>  ----------------------------<  131<H>  4.2   |  25  |  0.98    Ca    9.1      19 Dec 2018 07:46  Phos  4.6     12-19  Mg     2.6     12-19    TPro  8.0  /  Alb  3.2<L>  /  TBili  1.3<H>  /  DBili  x   /  AST  9<L>  /  ALT  19  /  AlkPhos  82  12-19        CAPILLARY BLOOD GLUCOSE      RADIOLOGY & ADDITIONAL TESTS:    Imaging  Reviewed:  YES    Consultant(s) Notes Reviewed:   YES    Plan of care was discussed with patient and /or primary care giver; all questions and concerns were addressed

## 2018-12-19 NOTE — DISCHARGE NOTE ADULT - CARE PLAN
Principal Discharge DX:	Gouty arthritis  Goal:	To prevent flare ups  Assessment and plan of treatment:	You were hospitalized for a gout flare up. Please continue taking allopurinol and colchicine as ordered. You were given a dose of steroids while in the hospital and will complete the course of steroids after discharge. Please follow up with your primary care doctor in 1 week.  Secondary Diagnosis:	CAD (coronary artery disease)  Assessment and plan of treatment:	Please continue taking your aspirin, plavix, and lipitor  Secondary Diagnosis:	Hypertension  Assessment and plan of treatment:	Please continue taking your lisinopril Principal Discharge DX:	Gouty arthritis  Goal:	To prevent flare ups  Assessment and plan of treatment:	You were hospitalized for a gout flare up. Please continue taking colchicine as ordered. You were given a dose of steroids while in the hospital and will complete a tapered course of steroids after discharge. Please follow up the clinic in one week.  Secondary Diagnosis:	CAD (coronary artery disease)  Assessment and plan of treatment:	Please continue taking your aspirin, plavix, and lipitor and follow up in the clinic in one week  Secondary Diagnosis:	Hypertension  Assessment and plan of treatment:	Please continue taking your lisinopril and follow up in the clinic in one week. Principal Discharge DX:	Gouty arthritis  Goal:	To prevent flare ups  Assessment and plan of treatment:	You were hospitalized for a gout flare up. Please continue taking colchicine as ordered. You were given a dose of steroids while in the hospital and will complete a tapered course of steroids after discharge. Please follow up at one of the clinics provided.  Secondary Diagnosis:	CAD (coronary artery disease)  Assessment and plan of treatment:	Please continue taking your aspirin, Plavix, and Lipitor. Please follow up at one of the clinics provided.  Secondary Diagnosis:	Hypertension  Assessment and plan of treatment:	Please continue taking your lisinopril. Please follow up at one of the clinics provided.

## 2018-12-19 NOTE — DISCHARGE NOTE ADULT - HOSPITAL COURSE
68 male with PMH of HTN , HLD , Gout , CAD , comes in with complaint of Left knee pain and swelling for 10 days. Patient basically had swelling and pain involving knees and foot of both legs , but symptoms were primarily in Left knee. Patient has a h/o Gout and is on colchicine for years , however ran out of medication about 2 weeks ago. Denies any fever , chills , nausea , vomiting , diarrhea , headache or any other ROS. In the ED, ortho performed an arthrocentesis and administered a steroid injection. He was admitted for a gout flare up and was given a dose of IV steroids that will be tapered. He was ordered standing tylenol for pain. DVT was ruled out by lower extremity dopplers studies.     Pt is stable for discharge today. 68 male with PMH of HTN , HLD , Gout , CAD , comes in with complaint of Left knee pain and swelling for 10 days. Patient basically had swelling and pain involving knees and foot of both legs , but symptoms were primarily in Left knee. Patient has a h/o Gout and is on colchicine for years , however ran out of medication about 2 weeks ago. Denies any fever , chills , nausea , vomiting , diarrhea , headache or any other ROS. In the ED, ortho performed an arthrocentesis and administered a steroid injection. He was admitted for a gout flare up and was given a dose of IV steroids that will be tapered upon discharge. He was ordered tylenol for pain. DVT was ruled out by lower extremity dopplers studies.     Pt is stable for discharge today.

## 2018-12-19 NOTE — DISCHARGE NOTE ADULT - ADDITIONAL INSTRUCTIONS
Please follow up with your primary care doctor in 1 week Please follow up at one of the clinics provided.

## 2018-12-19 NOTE — DISCHARGE NOTE ADULT - MEDICATION SUMMARY - MEDICATIONS TO TAKE
I will START or STAY ON the medications listed below when I get home from the hospital:    predniSONE 10 mg oral tablet  -- 4 tab(s) oral - by mouth once a day x 3 days  3 tab(s) oral - by mouth once a day x 3 days  2 tab(s) oral - by mouth once a day x 3 days  1 tab(s) oral - by mouth once a day x 3 days  -- It is very important that you take or use this exactly as directed.  Do not skip doses or discontinue unless directed by your doctor.  Obtain medical advice before taking any non-prescription drugs as some may affect the action of this medication.  Take with food or milk.    -- Indication: For Gout    acetaminophen 325 mg oral tablet  -- 2 tab(s) by mouth every 6 hours, As Needed pain   -- Indication: For Pain    aspirin 81 mg oral delayed release tablet  -- 1 tab(s) by mouth once a day  -- Indication: For CAD (coronary artery disease)    enalapril 2.5 mg oral tablet  -- 1 tab(s) by mouth once a day  -- Indication: For Hypertension    colchicine 0.6 mg oral tablet  -- 1 tab(s) by mouth every 12 hours  -- Indication: For Gout    atorvastatin 40 mg oral tablet  -- 1 tab(s) by mouth once a day (at bedtime)  -- Indication: For HLD (hyperlipidemia)    clopidogrel 75 mg oral tablet  -- 1 tab(s) by mouth once a day  -- Indication: For CAD (coronary artery disease)    ergocalciferol 50,000 intl units (1.25 mg) oral capsule  -- 1 cap(s) by mouth once a week  -- Indication: For vitamin D

## 2018-12-19 NOTE — DISCHARGE NOTE ADULT - PLAN OF CARE
To prevent flare ups You were hospitalized for a gout flare up. Please continue taking allopurinol and colchicine as ordered. You were given a dose of steroids while in the hospital and will complete the course of steroids after discharge. Please follow up with your primary care doctor in 1 week. Please continue taking your aspirin, plavix, and lipitor Please continue taking your lisinopril You were hospitalized for a gout flare up. Please continue taking colchicine as ordered. You were given a dose of steroids while in the hospital and will complete a tapered course of steroids after discharge. Please follow up the clinic in one week. Please continue taking your aspirin, plavix, and lipitor and follow up in the clinic in one week Please continue taking your lisinopril and follow up in the clinic in one week. Please continue taking your aspirin, Plavix, and Lipitor and follow up in the clinic in one week You were hospitalized for a gout flare up. Please continue taking colchicine as ordered. You were given a dose of steroids while in the hospital and will complete a tapered course of steroids after discharge. Please follow up at one of the clinics provided. Please continue taking your aspirin, Plavix, and Lipitor. Please follow up at one of the clinics provided. Please continue taking your lisinopril. Please follow up at one of the clinics provided.

## 2018-12-20 VITALS
RESPIRATION RATE: 16 BRPM | OXYGEN SATURATION: 96 % | SYSTOLIC BLOOD PRESSURE: 134 MMHG | TEMPERATURE: 99 F | DIASTOLIC BLOOD PRESSURE: 79 MMHG | HEART RATE: 92 BPM

## 2018-12-20 PROCEDURE — 84550 ASSAY OF BLOOD/URIC ACID: CPT

## 2018-12-20 PROCEDURE — 99239 HOSP IP/OBS DSCHRG MGMT >30: CPT

## 2018-12-20 PROCEDURE — 80061 LIPID PANEL: CPT

## 2018-12-20 PROCEDURE — 85027 COMPLETE CBC AUTOMATED: CPT

## 2018-12-20 PROCEDURE — 73620 X-RAY EXAM OF FOOT: CPT

## 2018-12-20 PROCEDURE — 99285 EMERGENCY DEPT VISIT HI MDM: CPT | Mod: 25

## 2018-12-20 PROCEDURE — 83036 HEMOGLOBIN GLYCOSYLATED A1C: CPT

## 2018-12-20 PROCEDURE — 80053 COMPREHEN METABOLIC PANEL: CPT

## 2018-12-20 PROCEDURE — 82306 VITAMIN D 25 HYDROXY: CPT

## 2018-12-20 PROCEDURE — 82962 GLUCOSE BLOOD TEST: CPT

## 2018-12-20 PROCEDURE — 84100 ASSAY OF PHOSPHORUS: CPT

## 2018-12-20 PROCEDURE — 83735 ASSAY OF MAGNESIUM: CPT

## 2018-12-20 PROCEDURE — 96374 THER/PROPH/DIAG INJ IV PUSH: CPT

## 2018-12-20 PROCEDURE — 73562 X-RAY EXAM OF KNEE 3: CPT

## 2018-12-20 PROCEDURE — 96375 TX/PRO/DX INJ NEW DRUG ADDON: CPT

## 2018-12-20 PROCEDURE — 82607 VITAMIN B-12: CPT

## 2018-12-20 PROCEDURE — 97162 PT EVAL MOD COMPLEX 30 MIN: CPT

## 2018-12-20 PROCEDURE — 84443 ASSAY THYROID STIM HORMONE: CPT

## 2018-12-20 PROCEDURE — 93971 EXTREMITY STUDY: CPT

## 2018-12-20 RX ORDER — ERGOCALCIFEROL 1.25 MG/1
1 CAPSULE ORAL
Qty: 5 | Refills: 0 | OUTPATIENT
Start: 2018-12-20 | End: 2019-01-23

## 2018-12-20 RX ORDER — ACETAMINOPHEN 500 MG
2 TABLET ORAL
Qty: 0 | Refills: 0 | COMMUNITY
Start: 2018-12-20

## 2018-12-20 RX ADMIN — Medication 40 MILLIGRAM(S): at 13:11

## 2018-12-20 RX ADMIN — Medication 650 MILLIGRAM(S): at 06:03

## 2018-12-20 RX ADMIN — ENOXAPARIN SODIUM 40 MILLIGRAM(S): 100 INJECTION SUBCUTANEOUS at 13:10

## 2018-12-20 RX ADMIN — Medication 650 MILLIGRAM(S): at 13:10

## 2018-12-20 RX ADMIN — Medication 2.5 MILLIGRAM(S): at 06:38

## 2018-12-20 RX ADMIN — Medication 650 MILLIGRAM(S): at 01:00

## 2018-12-20 RX ADMIN — Medication 0.6 MILLIGRAM(S): at 06:02

## 2018-12-20 RX ADMIN — Medication 650 MILLIGRAM(S): at 17:17

## 2018-12-20 RX ADMIN — Medication 650 MILLIGRAM(S): at 00:26

## 2018-12-20 RX ADMIN — Medication 650 MILLIGRAM(S): at 06:42

## 2018-12-20 RX ADMIN — Medication 81 MILLIGRAM(S): at 13:10

## 2018-12-20 RX ADMIN — Medication 40 MILLIGRAM(S): at 06:03

## 2018-12-20 RX ADMIN — Medication 0.6 MILLIGRAM(S): at 17:15

## 2018-12-20 RX ADMIN — Medication 650 MILLIGRAM(S): at 17:15

## 2018-12-20 RX ADMIN — CLOPIDOGREL BISULFATE 75 MILLIGRAM(S): 75 TABLET, FILM COATED ORAL at 17:15

## 2018-12-20 NOTE — PROGRESS NOTE ADULT - PROBLEM SELECTOR PLAN 4
DVT ppx - lovenox  LE US to r/o DVT as patient is complaining of left calf pain
DVT ppx - lovenox  LE US to r/o DVT as patient is complaining of left calf pain

## 2018-12-20 NOTE — PROGRESS NOTE ADULT - SUBJECTIVE AND OBJECTIVE BOX
Patient is a 68y old  Male who presents with a chief complaint of Left knee pain (19 Dec 2018 14:08)      INTERVAL HPI/OVERNIGHT EVENTS: seen and examined. The left knee pain improving. Able to walk with the walker.     MEDICATIONS  (STANDING):  acetaminophen   Tablet .. 650 milliGRAM(s) Oral every 6 hours  aspirin enteric coated 81 milliGRAM(s) Oral daily  atorvastatin 40 milliGRAM(s) Oral at bedtime  clopidogrel Tablet 75 milliGRAM(s) Oral daily  colchicine 0.6 milliGRAM(s) Oral every 12 hours  enalapril 2.5 milliGRAM(s) Oral daily  enoxaparin Injectable 40 milliGRAM(s) SubCutaneous daily  ergocalciferol 20298 Unit(s) Oral every week  methylPREDNISolone sodium succinate Injectable 40 milliGRAM(s) IV Push every 8 hours    MEDICATIONS  (PRN):      Allergies    No Known Allergies    Intolerances        REVIEW OF SYSTEMS:  CONSTITUTIONAL: No fever, weight loss, or fatigue  RESPIRATORY: No cough, wheezing, chills or hemoptysis; No shortness of breath  CARDIOVASCULAR: No chest pain, palpitations, dizziness, or leg swelling  GASTROINTESTINAL: No abdominal or epigastric pain. No nausea, vomiting, or hematemesis; No diarrhea or constipation. No melena or hematochezia.  NEUROLOGICAL: No headaches, memory loss, loss of strength, numbness, or tremors  MUSCULOSKELETAL: left knee pain and swelling; No muscle, back pain      Vital Signs Last 24 Hrs  T(C): 37.1 (20 Dec 2018 14:18), Max: 37.1 (20 Dec 2018 14:18)  T(F): 98.8 (20 Dec 2018 14:18), Max: 98.8 (20 Dec 2018 14:18)  HR: 92 (20 Dec 2018 14:18) (80 - 93)  BP: 134/79 (20 Dec 2018 14:18) (133/78 - 157/91)  BP(mean): --  RR: 16 (20 Dec 2018 14:18) (16 - 16)  SpO2: 96% (20 Dec 2018 14:18) (96% - 98%)    PHYSICAL EXAM:  GENERAL: NAD, well-groomed, well-developed  HEAD:  Atraumatic, Normocephalic  EYES: EOMI, PERRLA, conjunctiva and sclera clear  NECK: Supple, No JVD, Normal thyroid  NERVOUS SYSTEM:  Alert & Oriented X3, No gross focal deficits  CHEST/LUNG: Clear to percussion bilaterally; No rales, rhonchi, wheezing, or rubs  HEART: Regular rate and rhythm; No murmurs, rubs, or gallops  ABDOMEN: Soft, Nontender, Nondistended; Bowel sounds present  EXTREMITIES:  left knee slightly tender, No clubbing, cyanosis, or edema  SKIN: No rashes or lesions    LABS:                        12.5   12.1  )-----------( 399      ( 19 Dec 2018 07:46 )             39.2     12-19    140  |  105  |  25<H>  ----------------------------<  131<H>  4.2   |  25  |  0.98    Ca    9.1      19 Dec 2018 07:46  Phos  4.6     12-19  Mg     2.6     12-19    TPro  8.0  /  Alb  3.2<L>  /  TBili  1.3<H>  /  DBili  x   /  AST  9<L>  /  ALT  19  /  AlkPhos  82  12-19        CAPILLARY BLOOD GLUCOSE      POCT Blood Glucose.: 166 mg/dL (20 Dec 2018 11:41)  POCT Blood Glucose.: 136 mg/dL (20 Dec 2018 08:11)      RADIOLOGY & ADDITIONAL TESTS:    Imaging Personally Reviewed:  [ ] YES  [ ] NO    Consultant(s) Notes Reviewed:  [ ] YES  [ ] NO    Care Discussed with Consultants/Other Providers [ ] YES  [ ] NO    Plan of Care discussed with Housestaff [ ]YES [ ] NO

## 2018-12-20 NOTE — PROGRESS NOTE ADULT - ATTENDING COMMENTS
Medically stable for discharge. Spoke to patient in detail in regards to discharge medications.  Attempt to speak to daughter was not successful: called multiple times with no response.
Agree with all the above   Patient seen and examined at bedside: reports slight improvement of left knee pain, was able to walk with the walker, but still cant bare weight on left side.   ROS as above   PE:   General: laying in bed,   Cardio: regular rate and rhythm   Pulm: clear breath sounds   GI: obese abdomen, soft  Extr; left knee swollen and tender  Neuro: AOx3, no focal weakness     Vital Signs Last 24 Hrs  T(C): 36.6 (19 Dec 2018 14:32), Max: 36.6 (18 Dec 2018 16:29)  T(F): 97.8 (19 Dec 2018 14:32), Max: 97.8 (18 Dec 2018 16:29)  HR: 84 (19 Dec 2018 14:32) (79 - 104)  BP: 114/73 (19 Dec 2018 14:32) (114/73 - 152/70)  BP(mean): --  RR: 16 (19 Dec 2018 14:32) (15 - 18)  SpO2: 98% (19 Dec 2018 14:32) (95% - 99%)    1. Acute gouty arthritis of left knee: slight improvement from yesterday. Unable to bare charles   Will give one dose of solumedrol followed with tapering doses of prednisone   c/w Colchicine 0.5 mg po twice daily.   US LE to rule out DVT: the left LE is swollen and tender   PT evaluation    2. CAD: patient brought his home meds: he is on Plavix 75 mg. Stopped taking aspirin, claiming that it does not help.   Restarted Plavix   c/w Atorvastatin     3. HTN: restarted enalapril 2.5 mg po daily     Plan of care discussed with patient in detail.

## 2018-12-20 NOTE — PROGRESS NOTE ADULT - PROBLEM SELECTOR PLAN 1
Clinically improving   On Prednisone tapering dose along with colchiceine 0.6 mg po twice daily
left knee pain and swelling consistent with gouty arthritis   s/p arthrocentesis in ED by ortho   Continue colchicine   IV steroids today with taper starting tomorrow   Pain control with standing tylenol 650mg q6

## 2019-01-08 ENCOUNTER — INPATIENT (INPATIENT)
Facility: HOSPITAL | Age: 69
LOS: 6 days | Discharge: HOME HEALTH | DRG: 554 | End: 2019-01-15
Attending: INTERNAL MEDICINE | Admitting: INTERNAL MEDICINE
Payer: MEDICAID

## 2019-01-08 VITALS
WEIGHT: 199.96 LBS | RESPIRATION RATE: 18 BRPM | HEART RATE: 109 BPM | DIASTOLIC BLOOD PRESSURE: 78 MMHG | TEMPERATURE: 98 F | SYSTOLIC BLOOD PRESSURE: 160 MMHG | OXYGEN SATURATION: 98 % | HEIGHT: 68.9 IN

## 2019-01-08 DIAGNOSIS — M25.562 PAIN IN LEFT KNEE: ICD-10-CM

## 2019-01-08 PROBLEM — I25.10 ATHEROSCLEROTIC HEART DISEASE OF NATIVE CORONARY ARTERY WITHOUT ANGINA PECTORIS: Chronic | Status: ACTIVE | Noted: 2018-12-18

## 2019-01-08 PROBLEM — E78.5 HYPERLIPIDEMIA, UNSPECIFIED: Chronic | Status: ACTIVE | Noted: 2018-12-18

## 2019-01-08 PROBLEM — M10.9 GOUT, UNSPECIFIED: Chronic | Status: ACTIVE | Noted: 2018-12-18

## 2019-01-08 PROBLEM — I10 ESSENTIAL (PRIMARY) HYPERTENSION: Chronic | Status: ACTIVE | Noted: 2018-12-18

## 2019-01-08 LAB
ALBUMIN SERPL ELPH-MCNC: 3 G/DL — LOW (ref 3.5–5)
ALP SERPL-CCNC: 86 U/L — SIGNIFICANT CHANGE UP (ref 40–120)
ALT FLD-CCNC: 23 U/L DA — SIGNIFICANT CHANGE UP (ref 10–60)
ANION GAP SERPL CALC-SCNC: 10 MMOL/L — SIGNIFICANT CHANGE UP (ref 5–17)
AST SERPL-CCNC: 13 U/L — SIGNIFICANT CHANGE UP (ref 10–40)
BASOPHILS # BLD AUTO: 0 K/UL — SIGNIFICANT CHANGE UP (ref 0–0.2)
BASOPHILS NFR BLD AUTO: 0.2 % — SIGNIFICANT CHANGE UP (ref 0–2)
BILIRUB SERPL-MCNC: 1.6 MG/DL — HIGH (ref 0.2–1.2)
BUN SERPL-MCNC: 22 MG/DL — HIGH (ref 7–18)
CALCIUM SERPL-MCNC: 9 MG/DL — SIGNIFICANT CHANGE UP (ref 8.4–10.5)
CHLORIDE SERPL-SCNC: 107 MMOL/L — SIGNIFICANT CHANGE UP (ref 96–108)
CK SERPL-CCNC: 36 U/L — SIGNIFICANT CHANGE UP (ref 35–232)
CO2 SERPL-SCNC: 24 MMOL/L — SIGNIFICANT CHANGE UP (ref 22–31)
CREAT SERPL-MCNC: 0.97 MG/DL — SIGNIFICANT CHANGE UP (ref 0.5–1.3)
EOSINOPHIL # BLD AUTO: 0 K/UL — SIGNIFICANT CHANGE UP (ref 0–0.5)
EOSINOPHIL NFR BLD AUTO: 0.1 % — SIGNIFICANT CHANGE UP (ref 0–6)
GLUCOSE SERPL-MCNC: 160 MG/DL — HIGH (ref 70–99)
HCT VFR BLD CALC: 38.3 % — LOW (ref 39–50)
HGB BLD-MCNC: 12.2 G/DL — LOW (ref 13–17)
LACTATE SERPL-SCNC: 1.1 MMOL/L — SIGNIFICANT CHANGE UP (ref 0.7–2)
LIDOCAIN IGE QN: 118 U/L — SIGNIFICANT CHANGE UP (ref 73–393)
LYMPHOCYTES # BLD AUTO: 0.4 K/UL — LOW (ref 1–3.3)
LYMPHOCYTES # BLD AUTO: 2.5 % — LOW (ref 13–44)
MCHC RBC-ENTMCNC: 26.1 PG — LOW (ref 27–34)
MCHC RBC-ENTMCNC: 31.9 GM/DL — LOW (ref 32–36)
MCV RBC AUTO: 81.6 FL — SIGNIFICANT CHANGE UP (ref 80–100)
MONOCYTES # BLD AUTO: 0.9 K/UL — SIGNIFICANT CHANGE UP (ref 0–0.9)
MONOCYTES NFR BLD AUTO: 5.1 % — SIGNIFICANT CHANGE UP (ref 2–14)
NEUTROPHILS # BLD AUTO: 15.6 K/UL — HIGH (ref 1.8–7.4)
NEUTROPHILS NFR BLD AUTO: 92.1 % — HIGH (ref 43–77)
PLATELET # BLD AUTO: 307 K/UL — SIGNIFICANT CHANGE UP (ref 150–400)
POTASSIUM SERPL-MCNC: 4 MMOL/L — SIGNIFICANT CHANGE UP (ref 3.5–5.3)
POTASSIUM SERPL-SCNC: 4 MMOL/L — SIGNIFICANT CHANGE UP (ref 3.5–5.3)
PROT SERPL-MCNC: 7.5 G/DL — SIGNIFICANT CHANGE UP (ref 6–8.3)
RBC # BLD: 4.69 M/UL — SIGNIFICANT CHANGE UP (ref 4.2–5.8)
RBC # FLD: 14.4 % — SIGNIFICANT CHANGE UP (ref 10.3–14.5)
SODIUM SERPL-SCNC: 141 MMOL/L — SIGNIFICANT CHANGE UP (ref 135–145)
URATE SERPL-MCNC: 7.4 MG/DL — SIGNIFICANT CHANGE UP (ref 3.4–8.8)
WBC # BLD: 16.9 K/UL — HIGH (ref 3.8–10.5)
WBC # FLD AUTO: 16.9 K/UL — HIGH (ref 3.8–10.5)

## 2019-01-08 PROCEDURE — 99285 EMERGENCY DEPT VISIT HI MDM: CPT

## 2019-01-08 PROCEDURE — 73562 X-RAY EXAM OF KNEE 3: CPT | Mod: 26,LT

## 2019-01-08 RX ORDER — CLOPIDOGREL BISULFATE 75 MG/1
75 TABLET, FILM COATED ORAL DAILY
Qty: 0 | Refills: 0 | Status: DISCONTINUED | OUTPATIENT
Start: 2019-01-08 | End: 2019-01-15

## 2019-01-08 RX ORDER — MORPHINE SULFATE 50 MG/1
4 CAPSULE, EXTENDED RELEASE ORAL ONCE
Qty: 0 | Refills: 0 | Status: DISCONTINUED | OUTPATIENT
Start: 2019-01-08 | End: 2019-01-08

## 2019-01-08 RX ORDER — ATORVASTATIN CALCIUM 80 MG/1
40 TABLET, FILM COATED ORAL AT BEDTIME
Qty: 0 | Refills: 0 | Status: DISCONTINUED | OUTPATIENT
Start: 2019-01-08 | End: 2019-01-15

## 2019-01-08 RX ORDER — KETOROLAC TROMETHAMINE 30 MG/ML
30 SYRINGE (ML) INJECTION ONCE
Qty: 0 | Refills: 0 | Status: DISCONTINUED | OUTPATIENT
Start: 2019-01-08 | End: 2019-01-08

## 2019-01-08 RX ORDER — ACETAMINOPHEN 500 MG
650 TABLET ORAL EVERY 6 HOURS
Qty: 0 | Refills: 0 | Status: DISCONTINUED | OUTPATIENT
Start: 2019-01-08 | End: 2019-01-11

## 2019-01-08 RX ORDER — ASPIRIN/CALCIUM CARB/MAGNESIUM 324 MG
81 TABLET ORAL DAILY
Qty: 0 | Refills: 0 | Status: DISCONTINUED | OUTPATIENT
Start: 2019-01-08 | End: 2019-01-15

## 2019-01-08 RX ORDER — SODIUM CHLORIDE 9 MG/ML
1000 INJECTION INTRAMUSCULAR; INTRAVENOUS; SUBCUTANEOUS ONCE
Qty: 0 | Refills: 0 | Status: COMPLETED | OUTPATIENT
Start: 2019-01-08 | End: 2019-01-08

## 2019-01-08 RX ORDER — HEPARIN SODIUM 5000 [USP'U]/ML
5000 INJECTION INTRAVENOUS; SUBCUTANEOUS EVERY 8 HOURS
Qty: 0 | Refills: 0 | Status: DISCONTINUED | OUTPATIENT
Start: 2019-01-08 | End: 2019-01-12

## 2019-01-08 RX ADMIN — MORPHINE SULFATE 4 MILLIGRAM(S): 50 CAPSULE, EXTENDED RELEASE ORAL at 16:51

## 2019-01-08 RX ADMIN — SODIUM CHLORIDE 1000 MILLILITER(S): 9 INJECTION INTRAMUSCULAR; INTRAVENOUS; SUBCUTANEOUS at 18:05

## 2019-01-08 RX ADMIN — Medication 30 MILLIGRAM(S): at 16:51

## 2019-01-08 RX ADMIN — Medication 30 MILLIGRAM(S): at 17:21

## 2019-01-08 RX ADMIN — MORPHINE SULFATE 4 MILLIGRAM(S): 50 CAPSULE, EXTENDED RELEASE ORAL at 17:30

## 2019-01-08 RX ADMIN — SODIUM CHLORIDE 1000 MILLILITER(S): 9 INJECTION INTRAMUSCULAR; INTRAVENOUS; SUBCUTANEOUS at 16:51

## 2019-01-08 NOTE — ED PROVIDER NOTE - OBJECTIVE STATEMENT
69 y/o M with a significant PMHx of gout, DM, HTN, and HLD and no significant PSHx presents to the ED with c/o worsening L knee pain that extends down his L leg x 4 days with R elbow and R foot pain and chills. Pt states this pain is not the same as when he has gout. Pt denies fever, nausea, vomiting, chest pain, or any other complaints. NKDA. Iranian translation provided by ED nurse; 67 y/o M with a significant PMHx of gout, DM, HTN, and HLD and no significant PSHx presents to the ED with c/o worsening L knee pain that extends down his L leg x 4 days with R elbow and R foot pain and chills. Pt states this pain is not the same as when he has gout. Pt denies fever, nausea, vomiting, chest pain, or any other complaints. NKDA.

## 2019-01-08 NOTE — ED PROVIDER NOTE - EXTREMITY EXAM
right upper extremity findings/left lower extremity findings/right lower extremity findings/intact ROM of all extremities

## 2019-01-08 NOTE — ED PROVIDER NOTE - MEDICAL DECISION MAKING DETAILS
68 M with a PMHx of gout and DM presents to the ED with worsening L knee, R elbow, and R foot     pain x 4 days. Possible gout vs septic joint. Will obtain lactate, CK, basic blood work, xrays, give analgesias, and reassess. 68 M with a PMHx of gout and DM presents to the ED with worsening L knee, R elbow, and R foot. Labs, imaging. Possible arthrocentesis.     pain x 4 days. Possible gout vs septic joint. Will obtain lactate, CK, basic blood work, xrays, give analgesias, and reassess.

## 2019-01-08 NOTE — ED PROVIDER NOTE - PROGRESS NOTE DETAILS
Patient declined arthrocentesis to rule out septic joint. Expressed understanding that we may miss infection. Will consult Patient's orthopedic surgeon. Spoke with Ortho team who has assessed patient. Patient continues to decline arthrocentesis. Patient remains unable to ambulate will admit to medicine for further management. Sign out complete with Dr. Yu and Dr. Braxton.

## 2019-01-08 NOTE — CONSULT NOTE ADULT - SUBJECTIVE AND OBJECTIVE BOX
Pt Name: GANESH GANDHI  MRN: [id number]    ORTHOPEDIC CONSULT:    Orthopedic diagnosis:    HPI: Patient is a 68y Male presenting with left knee pain and swelling x 2 days. Patient states pain is generalized in left knee radiating down the lower leg. Rest and meds help pain. Patient states he had similar pain about 3 weeks ago and was admitted to hospital for same reason. On last admission, he had left knee arthrocentesis done by Orthopedics. aAbout 40cc of fluid was aspirated. Patient diagnosed with gout and was discharged on pain medication. Pain had subsided at that time, but has come back 2 days ago. Denies any CP, SOB, paresthesias.       PAST MEDICAL & SURGICAL HISTORY:  CAD (coronary artery disease)  HLD (hyperlipidemia)  Hypertension  Gout  No significant past surgical history      ALLERGIES: No Known Allergies      MEDICATIONS: lidocaine 1%/EPINEPHrine 1:100,000 Inj 10 milliLiter(s) Local Injection Once      PHYSICAL EXAM:    Vital Signs Last 24 Hrs  T(C): 36.7 (08 Jan 2019 15:56), Max: 36.7 (08 Jan 2019 15:56)  T(F): 98 (08 Jan 2019 15:56), Max: 98 (08 Jan 2019 15:56)  HR: 109 (08 Jan 2019 15:56) (109 - 109)  BP: 160/78 (08 Jan 2019 15:56) (160/78 - 160/78)  BP(mean): --  RR: 18 (08 Jan 2019 15:56) (18 - 18)  SpO2: 98% (08 Jan 2019 15:56) (98% - 98%)    Left Knee: Moderate effusion at left knee. No erythema. Skin intact. Tenderness to palpation. Calves soft and NT B/L. Sensation intact. 1+ pulses distally. ROM of toes and ankles. Decreased ROM of left knee due to pain.       LABS:                        12.2   16.9  )-----------( 307      ( 08 Jan 2019 16:55 )             38.3     01-08    141  |  107  |  22<H>  ----------------------------<  160<H>  4.0   |  24  |  0.97    Ca    9.0      08 Jan 2019 16:55    TPro  7.5  /  Alb  3.0<L>  /  TBili  1.6<H>  /  DBili  x   /  AST  13  /  ALT  23  /  AlkPhos  86  01-08        RADIOLOGY:   < from: Xray Knee 3 Views, Left (01.08.19 @ 17:27) >  EXAM:  KNEE AP LAT&OBL. LEFT                            PROCEDURE DATE:  01/08/2019          INTERPRETATION:  Clinical history: 68-yo-male - left knee pain.    Date of study: 01/8/19.    Prior: 12/19/18..     AP, oblique and lateral views of the left knee were obtained.    Findings: No fracture or dislocation is noted. Mild, underlying   osteoarthritis again noted at the patellofemoral joint. No bony erosive   or destructive lesions.  There is a suprapatellar bursal joint effusion. No soft tissue   mineralization seen. Again seen is small enthesophyte at the superior   patella.    Impression: No fracture or dislocation seen. Mild underlying degenerative   joint disease. Knee joint effusion.                    DOMINGUEZ STOVER M.D., ATTENDING RADIOLOGIST  This document has been electronically signed. Jan 8 2019  6:16PM    < end of copied text >    IMPRESSION: Pt is a  68y Male with Left knee effusion     PLAN:  -  Pain management  -  DVT prophylaxis  -  Recommend repeat left knee arthrocentesis. Patient is refusing left knee arthrocentesis at this time. Discussed with patient importance of left knee arthrocentesis to rule out infection vs gout. Patient is still refusing at this time. He wishes to continue with conservative management.  -  Treat with NSAIDS  -  Case discussed with Dr. Davison

## 2019-01-09 DIAGNOSIS — Z29.9 ENCOUNTER FOR PROPHYLACTIC MEASURES, UNSPECIFIED: ICD-10-CM

## 2019-01-09 DIAGNOSIS — M25.562 PAIN IN LEFT KNEE: ICD-10-CM

## 2019-01-09 DIAGNOSIS — I25.10 ATHEROSCLEROTIC HEART DISEASE OF NATIVE CORONARY ARTERY WITHOUT ANGINA PECTORIS: ICD-10-CM

## 2019-01-09 DIAGNOSIS — I10 ESSENTIAL (PRIMARY) HYPERTENSION: ICD-10-CM

## 2019-01-09 DIAGNOSIS — M10.9 GOUT, UNSPECIFIED: ICD-10-CM

## 2019-01-09 DIAGNOSIS — E78.5 HYPERLIPIDEMIA, UNSPECIFIED: ICD-10-CM

## 2019-01-09 LAB
ALBUMIN SERPL ELPH-MCNC: 2.5 G/DL — LOW (ref 3.5–5)
ALP SERPL-CCNC: 78 U/L — SIGNIFICANT CHANGE UP (ref 40–120)
ALT FLD-CCNC: 23 U/L DA — SIGNIFICANT CHANGE UP (ref 10–60)
ANION GAP SERPL CALC-SCNC: 10 MMOL/L — SIGNIFICANT CHANGE UP (ref 5–17)
APPEARANCE UR: CLEAR — SIGNIFICANT CHANGE UP
AST SERPL-CCNC: 10 U/L — SIGNIFICANT CHANGE UP (ref 10–40)
BASOPHILS # BLD AUTO: 0 K/UL — SIGNIFICANT CHANGE UP (ref 0–0.2)
BASOPHILS NFR BLD AUTO: 0.4 % — SIGNIFICANT CHANGE UP (ref 0–2)
BILIRUB SERPL-MCNC: 0.9 MG/DL — SIGNIFICANT CHANGE UP (ref 0.2–1.2)
BILIRUB UR-MCNC: NEGATIVE — SIGNIFICANT CHANGE UP
BUN SERPL-MCNC: 33 MG/DL — HIGH (ref 7–18)
CALCIUM SERPL-MCNC: 8.2 MG/DL — LOW (ref 8.4–10.5)
CHLORIDE SERPL-SCNC: 107 MMOL/L — SIGNIFICANT CHANGE UP (ref 96–108)
CO2 SERPL-SCNC: 25 MMOL/L — SIGNIFICANT CHANGE UP (ref 22–31)
COLOR SPEC: YELLOW — SIGNIFICANT CHANGE UP
CREAT SERPL-MCNC: 1.06 MG/DL — SIGNIFICANT CHANGE UP (ref 0.5–1.3)
DIFF PNL FLD: ABNORMAL
EOSINOPHIL # BLD AUTO: 0.1 K/UL — SIGNIFICANT CHANGE UP (ref 0–0.5)
EOSINOPHIL NFR BLD AUTO: 1.2 % — SIGNIFICANT CHANGE UP (ref 0–6)
GLUCOSE SERPL-MCNC: 89 MG/DL — SIGNIFICANT CHANGE UP (ref 70–99)
GLUCOSE UR QL: NEGATIVE — SIGNIFICANT CHANGE UP
HCT VFR BLD CALC: 34.6 % — LOW (ref 39–50)
HGB BLD-MCNC: 11 G/DL — LOW (ref 13–17)
KETONES UR-MCNC: NEGATIVE — SIGNIFICANT CHANGE UP
LEUKOCYTE ESTERASE UR-ACNC: ABNORMAL
LYMPHOCYTES # BLD AUTO: 1.4 K/UL — SIGNIFICANT CHANGE UP (ref 1–3.3)
LYMPHOCYTES # BLD AUTO: 11.6 % — LOW (ref 13–44)
MCHC RBC-ENTMCNC: 26.4 PG — LOW (ref 27–34)
MCHC RBC-ENTMCNC: 31.7 GM/DL — LOW (ref 32–36)
MCV RBC AUTO: 83.4 FL — SIGNIFICANT CHANGE UP (ref 80–100)
MONOCYTES # BLD AUTO: 0.9 K/UL — SIGNIFICANT CHANGE UP (ref 0–0.9)
MONOCYTES NFR BLD AUTO: 7.3 % — SIGNIFICANT CHANGE UP (ref 2–14)
NEUTROPHILS # BLD AUTO: 9.4 K/UL — HIGH (ref 1.8–7.4)
NEUTROPHILS NFR BLD AUTO: 79.6 % — HIGH (ref 43–77)
NITRITE UR-MCNC: NEGATIVE — SIGNIFICANT CHANGE UP
PH UR: 6 — SIGNIFICANT CHANGE UP (ref 5–8)
PHOSPHATE SERPL-MCNC: 3.9 MG/DL — SIGNIFICANT CHANGE UP (ref 2.5–4.5)
PLATELET # BLD AUTO: 313 K/UL — SIGNIFICANT CHANGE UP (ref 150–400)
POTASSIUM SERPL-MCNC: 3.9 MMOL/L — SIGNIFICANT CHANGE UP (ref 3.5–5.3)
POTASSIUM SERPL-SCNC: 3.9 MMOL/L — SIGNIFICANT CHANGE UP (ref 3.5–5.3)
PROT SERPL-MCNC: 6.8 G/DL — SIGNIFICANT CHANGE UP (ref 6–8.3)
PROT UR-MCNC: NEGATIVE — SIGNIFICANT CHANGE UP
RBC # BLD: 4.15 M/UL — LOW (ref 4.2–5.8)
RBC # FLD: 14.4 % — SIGNIFICANT CHANGE UP (ref 10.3–14.5)
RHEUMATOID FACT SERPL-ACNC: 16 IU/ML — HIGH (ref 0–13)
SODIUM SERPL-SCNC: 142 MMOL/L — SIGNIFICANT CHANGE UP (ref 135–145)
SP GR SPEC: 1.02 — SIGNIFICANT CHANGE UP (ref 1.01–1.02)
URATE SERPL-MCNC: 7.9 MG/DL — SIGNIFICANT CHANGE UP (ref 3.4–8.8)
UROBILINOGEN FLD QL: NEGATIVE — SIGNIFICANT CHANGE UP
WBC # BLD: 11.8 K/UL — HIGH (ref 3.8–10.5)
WBC # FLD AUTO: 11.8 K/UL — HIGH (ref 3.8–10.5)

## 2019-01-09 PROCEDURE — 73721 MRI JNT OF LWR EXTRE W/O DYE: CPT | Mod: 26,LT

## 2019-01-09 RX ORDER — CEFTRIAXONE 500 MG/1
2 INJECTION, POWDER, FOR SOLUTION INTRAMUSCULAR; INTRAVENOUS EVERY 24 HOURS
Qty: 0 | Refills: 0 | Status: DISCONTINUED | OUTPATIENT
Start: 2019-01-09 | End: 2019-01-09

## 2019-01-09 RX ORDER — AMPICILLIN SODIUM AND SULBACTAM SODIUM 250; 125 MG/ML; MG/ML
INJECTION, POWDER, FOR SUSPENSION INTRAMUSCULAR; INTRAVENOUS
Qty: 0 | Refills: 0 | Status: DISCONTINUED | OUTPATIENT
Start: 2019-01-09 | End: 2019-01-09

## 2019-01-09 RX ORDER — CEFTRIAXONE 500 MG/1
INJECTION, POWDER, FOR SOLUTION INTRAMUSCULAR; INTRAVENOUS
Qty: 0 | Refills: 0 | Status: DISCONTINUED | OUTPATIENT
Start: 2019-01-09 | End: 2019-01-09

## 2019-01-09 RX ADMIN — Medication 650 MILLIGRAM(S): at 17:25

## 2019-01-09 RX ADMIN — Medication 650 MILLIGRAM(S): at 22:27

## 2019-01-09 RX ADMIN — HEPARIN SODIUM 5000 UNIT(S): 5000 INJECTION INTRAVENOUS; SUBCUTANEOUS at 14:45

## 2019-01-09 RX ADMIN — Medication 650 MILLIGRAM(S): at 15:45

## 2019-01-09 RX ADMIN — CEFTRIAXONE 100 GRAM(S): 500 INJECTION, POWDER, FOR SOLUTION INTRAMUSCULAR; INTRAVENOUS at 11:15

## 2019-01-09 RX ADMIN — Medication 650 MILLIGRAM(S): at 01:32

## 2019-01-09 RX ADMIN — Medication 650 MILLIGRAM(S): at 03:45

## 2019-01-09 RX ADMIN — HEPARIN SODIUM 5000 UNIT(S): 5000 INJECTION INTRAVENOUS; SUBCUTANEOUS at 05:55

## 2019-01-09 RX ADMIN — Medication 81 MILLIGRAM(S): at 12:28

## 2019-01-09 RX ADMIN — HEPARIN SODIUM 5000 UNIT(S): 5000 INJECTION INTRAVENOUS; SUBCUTANEOUS at 21:06

## 2019-01-09 RX ADMIN — CLOPIDOGREL BISULFATE 75 MILLIGRAM(S): 75 TABLET, FILM COATED ORAL at 12:28

## 2019-01-09 RX ADMIN — Medication 650 MILLIGRAM(S): at 23:55

## 2019-01-09 RX ADMIN — HEPARIN SODIUM 5000 UNIT(S): 5000 INJECTION INTRAVENOUS; SUBCUTANEOUS at 01:29

## 2019-01-09 RX ADMIN — ATORVASTATIN CALCIUM 40 MILLIGRAM(S): 80 TABLET, FILM COATED ORAL at 21:05

## 2019-01-09 RX ADMIN — Medication 2.5 MILLIGRAM(S): at 05:55

## 2019-01-09 NOTE — CONSULT NOTE ADULT - MUSCULOSKELETAL COMMENTS
left knee + swelling, + tenderness, + warmth, decreased ROM Left knee pain, unable to walk left knee + swelling (minimal), + tenderness on passive ROM, + mild warmth, no erythema of skin, also has some swelling, warmth and tenderness of his left ankle left knee + swelling (minimal), + tenderness on passive ROM, + mild warmth, no erythema of skin, also has some swelling, warmth and tenderness of his right ankle

## 2019-01-09 NOTE — H&P ADULT - HISTORY OF PRESENT ILLNESS
Patient is a 68y Male with  PMH of HTN , HLD , Gout , CAD  presenting with left knee pain and swelling x 2 days. Patient states pain is generalized in left knee radiating down the lower leg and left ankle. . Patient states he had similar pain about 3 weeks ago and was admitted to hospital for same reason. On last admission, he had left knee arthrocentesis done by Orthopedics. About 40cc of fluid was aspirated. Patient diagnosed with gout and was discharged on prednisone and colchicine but his pain came back 2 days back . Denies any CP, SOB, paresthesias. Off note, Pt completed steroid course yesterday  In ED Pt was afebrile with white count with left shift ,likely from steroids use

## 2019-01-09 NOTE — PROGRESS NOTE ADULT - PROBLEM SELECTOR PLAN 1
MRI ordered. Form  Faxed. Unasyn is backordered. Discussed case with Dr. Doss. IV Ceftriaxone 2 grams daily ordered instead.  Prednisone continued as pt took last dose at home PTA

## 2019-01-09 NOTE — CONSULT NOTE ADULT - MOTOR
Normal strength on right lower extremities, unable to evaluate strength on left lower extremities because of pain.

## 2019-01-09 NOTE — H&P ADULT - PROBLEM SELECTOR PLAN 1
p/w left knee pain , similar complaint as on last admission  pt was diagnosed with gouty arthritis on last adm, arthrocentesis was done  and was d/c on prednisone and colchicine  Xray L knee : suprapatellar bursal joint effusion  Left ankle pain and tenderness too   D/d Gout flare vs Rheumatoid arthritis vs septic arthritis  septic arthritis less likely : no fever, white count from steroid use,no gross erthyma or warmth  ESR elevated 81  Ortho consulted : pt refused arthrocentesis this time, wants medical management   f/u CRP , RF, Anti CCP, uric acid levels  will do MRI of L knee   pain management with tylenol and percocet for now   will hold col chine and steroids  PT eval p/w left knee pain , similar complaint as on last admission  pt was diagnosed with gouty arthritis on last adm, arthrocentesis was done  and was d/c on prednisone and colchicine  Xray L knee : suprapatellar bursal joint effusion  Left ankle pain and tenderness too   D/d Gout flare vs Rheumatoid arthritis vs septic arthritis  septic arthritis less likely : no fever, white count from steroid use,no gross erthyma or warmth  ESR elevated 81  Ortho consulted : pt refused arthrocentesis this time, wants medical management   f/u CRP , RF, Anti CCP, uric acid levels  will do MRI of L knee   pain management with tylenol and percocet for now   c/w col chine and steroids  started on be doyle consulted. He wants to be called once labs are back  ID Dr Doss  PT [facility]

## 2019-01-09 NOTE — CONSULT NOTE ADULT - MUSCULOSKELETAL
detailed exam decreased ROM due to pain/no calf tenderness/B/l fore foot internally rotated/joint swelling/decreased ROM/joint warmth details… decreased ROM/no calf tenderness/decreased ROM due to pain/joint swelling/joint warmth

## 2019-01-09 NOTE — H&P ADULT - ASSESSMENT
Patient is a 68y Male with  PMH of HTN , HLD , Gout , CAD  presenting with left knee pain and swelling x 2 days.    ED course :   T 98 F /78  RR 19   Labs : WBC count 16.9 with left shift  Xray Left knee : There is a suprapatellar bursal joint effusion,similar to last adm Xray    Pt admitted for L knee pain

## 2019-01-09 NOTE — H&P ADULT - EXTREMITIES COMMENTS
L knee warm, tender and swollen, L lateral malleolus tender   unable to move left leg due to pain , sensations intact

## 2019-01-09 NOTE — PROGRESS NOTE ADULT - SUBJECTIVE AND OBJECTIVE BOX
Patient is a 68y Male with  PMH of HTN , HLD , Gout , CAD  presenting with left knee pain and swelling x 2 days. Patient states pain is generalized in left knee radiating down the lower leg and left ankle. . Patient states he had similar pain about 3 weeks ago and was admitted to hospital for same reason. On last admission, he had left knee arthrocentesis done by Orthopedics. About 40cc of fluid was aspirated. Patient diagnosed with gout and was discharged on prednisone and colchicine but his pain came back 2 days ago . Denies any CP, SOB, paresthesias. Off note, Pt completed steroid course yesterday.  In ED Pt was afebrile with white count with left shift ,likely from steroids use .  MRI was ordered by resident and screening for completed by me and faxed/received

## 2019-01-09 NOTE — CONSULT NOTE ADULT - SUBJECTIVE AND OBJECTIVE BOX
HPI:  Patient is a 68y Male with  PMH of HTN , HLD , Gout , CAD  presenting with left knee pain and swelling x 2 days. Patient states pain is generalized in left knee radiating down the lower leg and left ankle. . Patient states he had similar pain about 3 weeks ago and was admitted to hospital for same reason. On last admission, he had left knee arthrocentesis done by Orthopedics. About 40cc of fluid was aspirated. Patient diagnosed with gout and was discharged on prednisone and colchicine but his pain came back 2 days back . Denies any CP, SOB, paresthesias. Off note, Pt completed steroid course yesterday  In ED Pt was afebrile with white count with left shift ,likely from steroids use (2019 02:26)    PAST MEDICAL & SURGICAL HISTORY:  CAD (coronary artery disease)  HLD (hyperlipidemia)  Hypertension  Gout  No significant past surgical history    ALLERGIES: No Known Allergies    MEDS:  acetaminophen   Tablet .. 650 milliGRAM(s) Oral every 6 hours PRN  aspirin enteric coated 81 milliGRAM(s) Oral daily  atorvastatin 40 milliGRAM(s) Oral at bedtime  cefTRIAXone   IVPB 2 Gram(s) IV Intermittent every 24 hours  clopidogrel Tablet 75 milliGRAM(s) Oral daily  enalapril 2.5 milliGRAM(s) Oral daily  heparin  Injectable 5000 Unit(s) SubCutaneous every 8 hours  predniSONE   Tablet 40 milliGRAM(s) Oral daily    SOCIAL HISTORY:  Smoker:  former smoker     FAMILY HISTORY:  No pertinent family history in first degree relatives    VITALS:  Vital Signs Last 24 Hrs  T(C): 36.7 (2019 05:44), Max: 36.7 (2019 15:56)  T(F): 98.1 (2019 05:44), Max: 98.1 (2019 05:44)  HR: 85 (2019 05:44) (85 - 109)  BP: 138/85 (2019 05:44) (118/71 - 160/78)  BP(mean): --  RR: 18 (2019 05:44) (18 - 18)  SpO2: 100% (2019 05:44) (97% - 100%)      LABS/DIAGNOSTIC TESTS:                        11.0   11.8  )-----------( 313      ( 2019 10:48 )             34.6     WBC Count: 11.8 K/uL (01-09 @ 10:48)  WBC Count: 16.9 K/uL ( @ 16:55)        142  |  107  |  33<H>  ----------------------------<  89  3.9   |  25  |  1.06    Ca    8.2<L>      2019 10:48  Phos  3.9         TPro  6.8  /  Alb  2.5<L>  /  TBili  0.9  /  DBili  x   /  AST  10  /  ALT  23  /  AlkPhos  78      Urinalysis Basic - ( 2019 11:00 )    Color: Yellow / Appearance: Clear / S.020 / pH: x  Gluc: x / Ketone: Negative  / Bili: Negative / Urobili: Negative   Blood: x / Protein: Negative / Nitrite: Negative   Leuk Esterase: Trace / RBC: 0-2 /HPF / WBC 3-5 /HPF   Sq Epi: x / Non Sq Epi: Few /HPF / Bacteria: Trace /HPF      LIVER FUNCTIONS - ( 2019 10:48 )  Alb: 2.5 g/dL / Pro: 6.8 g/dL / ALK PHOS: 78 U/L / ALT: 23 U/L DA / AST: 10 U/L / GGT: x             Lactate, Blood: 1.1 mmol/L ( @ 16:55)    ABG -     CULTURES:   Awaiting blood culture     RADIOLOGY:    EXAM:  MR KNEE LT                            PROCEDURE DATE:  2019          INTERPRETATION:  EXAMINATION: MRI of the left knee    HISTORY: Left knee pain with effusion    TECHNIQUE: Multiplanar, multisequential MR imaging was performed.    FINDINGS:     Fluid: There is a moderate-sized joint effusion with synovitis and   hemorrhagic debris    Ligaments: There is moderate grade partial tearing of the PCL with   periligamentous edema. Moderate mucinous degeneration of the ACL without   tear. The collateral ligaments are intact.    Medial Compartment: No medial meniscal tear. There is partial   meniscocapsular separation at the inner aspect of the posterior horn of   the medial meniscus. Preserved articular cartilage.    Lateral Compartment: No lateral meniscal tear. Preserved articular   cartilage.    Patellofemoral Compartment: There is focal area of high-grade chondral   wear along the central to medial aspect of the trochlear sulcus with   superimposed foci of deep chondral fissuring and underlying subchondral   cystic change and marrow edema.    Extensor Mechanism: No tear of the quadriceps or patellar tendons.    Bones: There is no fracture or osteonecrosis.    IMPRESSION: Moderate-sized joint effusion with hemorrhagic debris. No   fracture.    Moderate grade partial tearing of the PCL with periligamentous edema.   Moderate mucinous degeneration of the ACL without tear    Focal area of chondral wear and fissuring along the central trochlear   sulcus.    Partial meniscocapsular separation at the inner aspect of the posterior   horn of the medial meniscus.    KASSANDRA PLASENCIA M.D., ATTENDING RADIOLOGIST  This document has been electronically signed. 2019  9:57AM              EXAM:  KNEE AP LAT&OBL. LEFT                            PROCEDURE DATE:  2019          INTERPRETATION:  Clinical history: 68-yo-male - left knee pain.    Date of study: 19.    Prior: 18..     AP, oblique and lateral views of the left knee were obtained.    Findings: No fracture or dislocation is noted. Mild, underlying   osteoarthritis again noted at the patellofemoral joint. No bony erosive   or destructive lesions.  There is a suprapatellar bursal joint effusion. No soft tissue   mineralization seen. Again seen is small enthesophyte at the superior   patella.    Impression: No fracture or dislocation seen. Mild underlying degenerative   joint disease. Knee joint effusion.                    DOMINGUEZ STOVER M.D., ATTENDING RADIOLOGIST  This document has been electronically signed. 2019  6:16PM HPI:  Patient is a 68y Male with  PMH of HTN , HLD , Gout , CAD  presenting with left knee pain and swelling x 2 days. Patient states pain is generalized in left knee radiating down the lower leg and left ankle. . Patient states he had similar pain about 3 weeks ago and was admitted to hospital for same reason. On last admission, he had left knee arthrocentesis done by Orthopedics. About 40cc of fluid was aspirated. Patient diagnosed with gout and was discharged on prednisone and colchicine but his pain came back 2 days back . Denies any CP, SOB, paresthesias. Off note, Pt completed steroid course yesterday.  He has not had fevers or chills but c/o left knee and left ankle pain and swelling.    PAST MEDICAL & SURGICAL HISTORY:  CAD (coronary artery disease)  HLD (hyperlipidemia)  Hypertension  Gout  No significant past surgical history    ALLERGIES: No Known Allergies    MEDS:  acetaminophen   Tablet .. 650 milliGRAM(s) Oral every 6 hours PRN  aspirin enteric coated 81 milliGRAM(s) Oral daily  atorvastatin 40 milliGRAM(s) Oral at bedtime  cefTRIAXone   IVPB 2 Gram(s) IV Intermittent every 24 hours  clopidogrel Tablet 75 milliGRAM(s) Oral daily  enalapril 2.5 milliGRAM(s) Oral daily  heparin  Injectable 5000 Unit(s) SubCutaneous every 8 hours  predniSONE   Tablet 40 milliGRAM(s) Oral daily    SOCIAL HISTORY:  Smoker:  former smoker     FAMILY HISTORY:  No pertinent family history in first degree relatives    VITALS:  Vital Signs Last 24 Hrs  T(C): 36.7 (2019 05:44), Max: 36.7 (2019 15:56)  T(F): 98.1 (2019 05:44), Max: 98.1 (2019 05:44)  HR: 85 (2019 05:44) (85 - 109)  BP: 138/85 (2019 05:44) (118/71 - 160/78)  BP(mean): --  RR: 18 (2019 05:44) (18 - 18)  SpO2: 100% (2019 05:44) (97% - 100%)      LABS/DIAGNOSTIC TESTS:                        11.0   11.8  )-----------( 313      ( 2019 10:48 )             34.6     WBC Count: 11.8 K/uL (01-09 @ 10:48)  WBC Count: 16.9 K/uL ( @ 16:55)        142  |  107  |  33<H>  ----------------------------<  89  3.9   |  25  |  1.06    Ca    8.2<L>      2019 10:48  Phos  3.9         TPro  6.8  /  Alb  2.5<L>  /  TBili  0.9  /  DBili  x   /  AST  10  /  ALT  23  /  AlkPhos  78      Urinalysis Basic - ( 2019 11:00 )    Color: Yellow / Appearance: Clear / S.020 / pH: x  Gluc: x / Ketone: Negative  / Bili: Negative / Urobili: Negative   Blood: x / Protein: Negative / Nitrite: Negative   Leuk Esterase: Trace / RBC: 0-2 /HPF / WBC 3-5 /HPF   Sq Epi: x / Non Sq Epi: Few /HPF / Bacteria: Trace /HPF      LIVER FUNCTIONS - ( 2019 10:48 )  Alb: 2.5 g/dL / Pro: 6.8 g/dL / ALK PHOS: 78 U/L / ALT: 23 U/L DA / AST: 10 U/L / GGT: x             Lactate, Blood: 1.1 mmol/L ( @ 16:55)    ABG -     CULTURES:   Awaiting blood culture     RADIOLOGY:    EXAM:  MR KNEE LT                            PROCEDURE DATE:  2019          INTERPRETATION:  EXAMINATION: MRI of the left knee    HISTORY: Left knee pain with effusion    TECHNIQUE: Multiplanar, multisequential MR imaging was performed.    FINDINGS:     Fluid: There is a moderate-sized joint effusion with synovitis and   hemorrhagic debris    Ligaments: There is moderate grade partial tearing of the PCL with   periligamentous edema. Moderate mucinous degeneration of the ACL without   tear. The collateral ligaments are intact.    Medial Compartment: No medial meniscal tear. There is partial   meniscocapsular separation at the inner aspect of the posterior horn of   the medial meniscus. Preserved articular cartilage.    Lateral Compartment: No lateral meniscal tear. Preserved articular   cartilage.    Patellofemoral Compartment: There is focal area of high-grade chondral   wear along the central to medial aspect of the trochlear sulcus with   superimposed foci of deep chondral fissuring and underlying subchondral   cystic change and marrow edema.    Extensor Mechanism: No tear of the quadriceps or patellar tendons.    Bones: There is no fracture or osteonecrosis.    IMPRESSION: Moderate-sized joint effusion with hemorrhagic debris. No   fracture.    Moderate grade partial tearing of the PCL with periligamentous edema.   Moderate mucinous degeneration of the ACL without tear    Focal area of chondral wear and fissuring along the central trochlear   sulcus.    Partial meniscocapsular separation at the inner aspect of the posterior   horn of the medial meniscus.    KASSANDRA PLASENCIA M.D., ATTENDING RADIOLOGIST  This document has been electronically signed. 2019  9:57AM              EXAM:  KNEE AP LAT&OBL. LEFT                            PROCEDURE DATE:  2019          INTERPRETATION:  Clinical history: 68-yo-male - left knee pain.    Date of study: 19.    Prior: 18..     AP, oblique and lateral views of the left knee were obtained.    Findings: No fracture or dislocation is noted. Mild, underlying   osteoarthritis again noted at the patellofemoral joint. No bony erosive   or destructive lesions.  There is a suprapatellar bursal joint effusion. No soft tissue   mineralization seen. Again seen is small enthesophyte at the superior   patella.    Impression: No fracture or dislocation seen. Mild underlying degenerative   joint disease. Knee joint effusion.                    DOMINGUEZ STOVER M.D., ATTENDING RADIOLOGIST  This document has been electronically signed. 2019  6:16PM HPI:  Patient is a 68y Male with  PMH of HTN , HLD , Gout , CAD  presenting with left knee pain and swelling x 2 days. Patient states pain is generalized in left knee radiating down the lower leg and left ankle. . Patient states he had similar pain about 3 weeks ago and was admitted to hospital for same reason. On last admission, he had left knee arthrocentesis done by Orthopedics. About 40cc of fluid was aspirated. Patient diagnosed with gout and was discharged on prednisone and colchicine but his pain came back 2 days back . Denies any CP, SOB, paresthesias. Off note, Pt completed steroid course yesterday.  He has not had fevers or chills but c/o left knee and right ankle pain and swelling.    PAST MEDICAL & SURGICAL HISTORY:  CAD (coronary artery disease)  HLD (hyperlipidemia)  Hypertension  Gout  No significant past surgical history    ALLERGIES: No Known Allergies    MEDS:  acetaminophen   Tablet .. 650 milliGRAM(s) Oral every 6 hours PRN  aspirin enteric coated 81 milliGRAM(s) Oral daily  atorvastatin 40 milliGRAM(s) Oral at bedtime  cefTRIAXone   IVPB 2 Gram(s) IV Intermittent every 24 hours  clopidogrel Tablet 75 milliGRAM(s) Oral daily  enalapril 2.5 milliGRAM(s) Oral daily  heparin  Injectable 5000 Unit(s) SubCutaneous every 8 hours  predniSONE   Tablet 40 milliGRAM(s) Oral daily    SOCIAL HISTORY:  Smoker:  former smoker     FAMILY HISTORY:  No pertinent family history in first degree relatives    VITALS:  Vital Signs Last 24 Hrs  T(C): 36.7 (2019 05:44), Max: 36.7 (2019 15:56)  T(F): 98.1 (2019 05:44), Max: 98.1 (2019 05:44)  HR: 85 (2019 05:44) (85 - 109)  BP: 138/85 (2019 05:44) (118/71 - 160/78)  BP(mean): --  RR: 18 (2019 05:44) (18 - 18)  SpO2: 100% (2019 05:44) (97% - 100%)      LABS/DIAGNOSTIC TESTS:                        11.0   11.8  )-----------( 313      ( 2019 10:48 )             34.6     WBC Count: 11.8 K/uL (01-09 @ 10:48)  WBC Count: 16.9 K/uL ( @ 16:55)        142  |  107  |  33<H>  ----------------------------<  89  3.9   |  25  |  1.06    Ca    8.2<L>      2019 10:48  Phos  3.9         TPro  6.8  /  Alb  2.5<L>  /  TBili  0.9  /  DBili  x   /  AST  10  /  ALT  23  /  AlkPhos  78      Urinalysis Basic - ( 2019 11:00 )    Color: Yellow / Appearance: Clear / S.020 / pH: x  Gluc: x / Ketone: Negative  / Bili: Negative / Urobili: Negative   Blood: x / Protein: Negative / Nitrite: Negative   Leuk Esterase: Trace / RBC: 0-2 /HPF / WBC 3-5 /HPF   Sq Epi: x / Non Sq Epi: Few /HPF / Bacteria: Trace /HPF      LIVER FUNCTIONS - ( 2019 10:48 )  Alb: 2.5 g/dL / Pro: 6.8 g/dL / ALK PHOS: 78 U/L / ALT: 23 U/L DA / AST: 10 U/L / GGT: x             Lactate, Blood: 1.1 mmol/L ( @ 16:55)    ABG -     CULTURES:   Awaiting blood culture     RADIOLOGY:    EXAM:  MR KNEE LT                            PROCEDURE DATE:  2019          INTERPRETATION:  EXAMINATION: MRI of the left knee    HISTORY: Left knee pain with effusion    TECHNIQUE: Multiplanar, multisequential MR imaging was performed.    FINDINGS:     Fluid: There is a moderate-sized joint effusion with synovitis and   hemorrhagic debris    Ligaments: There is moderate grade partial tearing of the PCL with   periligamentous edema. Moderate mucinous degeneration of the ACL without   tear. The collateral ligaments are intact.    Medial Compartment: No medial meniscal tear. There is partial   meniscocapsular separation at the inner aspect of the posterior horn of   the medial meniscus. Preserved articular cartilage.    Lateral Compartment: No lateral meniscal tear. Preserved articular   cartilage.    Patellofemoral Compartment: There is focal area of high-grade chondral   wear along the central to medial aspect of the trochlear sulcus with   superimposed foci of deep chondral fissuring and underlying subchondral   cystic change and marrow edema.    Extensor Mechanism: No tear of the quadriceps or patellar tendons.    Bones: There is no fracture or osteonecrosis.    IMPRESSION: Moderate-sized joint effusion with hemorrhagic debris. No   fracture.    Moderate grade partial tearing of the PCL with periligamentous edema.   Moderate mucinous degeneration of the ACL without tear    Focal area of chondral wear and fissuring along the central trochlear   sulcus.    Partial meniscocapsular separation at the inner aspect of the posterior   horn of the medial meniscus.    KASSANDRA PLASENCIA M.D., ATTENDING RADIOLOGIST  This document has been electronically signed. 2019  9:57AM              EXAM:  KNEE AP LAT&OBL. LEFT                            PROCEDURE DATE:  2019          INTERPRETATION:  Clinical history: 68-yo-male - left knee pain.    Date of study: 19.    Prior: 18..     AP, oblique and lateral views of the left knee were obtained.    Findings: No fracture or dislocation is noted. Mild, underlying   osteoarthritis again noted at the patellofemoral joint. No bony erosive   or destructive lesions.  There is a suprapatellar bursal joint effusion. No soft tissue   mineralization seen. Again seen is small enthesophyte at the superior   patella.    Impression: No fracture or dislocation seen. Mild underlying degenerative   joint disease. Knee joint effusion.                    DOMINGUEZ STOVER M.D., ATTENDING RADIOLOGIST  This document has been electronically signed. 2019  6:16PM

## 2019-01-10 DIAGNOSIS — M25.562 PAIN IN LEFT KNEE: ICD-10-CM

## 2019-01-10 DIAGNOSIS — M10.9 GOUT, UNSPECIFIED: ICD-10-CM

## 2019-01-10 LAB
ANION GAP SERPL CALC-SCNC: 12 MMOL/L — SIGNIFICANT CHANGE UP (ref 5–17)
BUN SERPL-MCNC: 22 MG/DL — HIGH (ref 7–18)
CALCIUM SERPL-MCNC: 8.6 MG/DL — SIGNIFICANT CHANGE UP (ref 8.4–10.5)
CHLORIDE SERPL-SCNC: 103 MMOL/L — SIGNIFICANT CHANGE UP (ref 96–108)
CO2 SERPL-SCNC: 24 MMOL/L — SIGNIFICANT CHANGE UP (ref 22–31)
CREAT SERPL-MCNC: 1.01 MG/DL — SIGNIFICANT CHANGE UP (ref 0.5–1.3)
GLUCOSE SERPL-MCNC: 198 MG/DL — HIGH (ref 70–99)
HCT VFR BLD CALC: 36.9 % — LOW (ref 39–50)
HGB BLD-MCNC: 11.8 G/DL — LOW (ref 13–17)
MCHC RBC-ENTMCNC: 26.7 PG — LOW (ref 27–34)
MCHC RBC-ENTMCNC: 32.1 GM/DL — SIGNIFICANT CHANGE UP (ref 32–36)
MCV RBC AUTO: 83.2 FL — SIGNIFICANT CHANGE UP (ref 80–100)
PLATELET # BLD AUTO: 372 K/UL — SIGNIFICANT CHANGE UP (ref 150–400)
POTASSIUM SERPL-MCNC: 4.2 MMOL/L — SIGNIFICANT CHANGE UP (ref 3.5–5.3)
POTASSIUM SERPL-SCNC: 4.2 MMOL/L — SIGNIFICANT CHANGE UP (ref 3.5–5.3)
RBC # BLD: 4.44 M/UL — SIGNIFICANT CHANGE UP (ref 4.2–5.8)
RBC # FLD: 14.2 % — SIGNIFICANT CHANGE UP (ref 10.3–14.5)
SODIUM SERPL-SCNC: 139 MMOL/L — SIGNIFICANT CHANGE UP (ref 135–145)
WBC # BLD: 11.2 K/UL — HIGH (ref 3.8–10.5)
WBC # FLD AUTO: 11.2 K/UL — HIGH (ref 3.8–10.5)

## 2019-01-10 RX ORDER — OXYCODONE AND ACETAMINOPHEN 5; 325 MG/1; MG/1
1 TABLET ORAL EVERY 6 HOURS
Qty: 0 | Refills: 0 | Status: DISCONTINUED | OUTPATIENT
Start: 2019-01-10 | End: 2019-01-14

## 2019-01-10 RX ADMIN — Medication 650 MILLIGRAM(S): at 04:43

## 2019-01-10 RX ADMIN — OXYCODONE AND ACETAMINOPHEN 1 TABLET(S): 5; 325 TABLET ORAL at 18:15

## 2019-01-10 RX ADMIN — HEPARIN SODIUM 5000 UNIT(S): 5000 INJECTION INTRAVENOUS; SUBCUTANEOUS at 06:42

## 2019-01-10 RX ADMIN — ATORVASTATIN CALCIUM 40 MILLIGRAM(S): 80 TABLET, FILM COATED ORAL at 21:17

## 2019-01-10 RX ADMIN — CLOPIDOGREL BISULFATE 75 MILLIGRAM(S): 75 TABLET, FILM COATED ORAL at 11:31

## 2019-01-10 RX ADMIN — OXYCODONE AND ACETAMINOPHEN 1 TABLET(S): 5; 325 TABLET ORAL at 17:38

## 2019-01-10 RX ADMIN — Medication 650 MILLIGRAM(S): at 05:55

## 2019-01-10 RX ADMIN — Medication 650 MILLIGRAM(S): at 21:16

## 2019-01-10 RX ADMIN — HEPARIN SODIUM 5000 UNIT(S): 5000 INJECTION INTRAVENOUS; SUBCUTANEOUS at 21:17

## 2019-01-10 RX ADMIN — Medication 81 MILLIGRAM(S): at 11:31

## 2019-01-10 RX ADMIN — Medication 2.5 MILLIGRAM(S): at 06:42

## 2019-01-10 RX ADMIN — OXYCODONE AND ACETAMINOPHEN 1 TABLET(S): 5; 325 TABLET ORAL at 07:30

## 2019-01-10 RX ADMIN — Medication 650 MILLIGRAM(S): at 22:19

## 2019-01-10 RX ADMIN — OXYCODONE AND ACETAMINOPHEN 1 TABLET(S): 5; 325 TABLET ORAL at 06:41

## 2019-01-10 RX ADMIN — Medication 40 MILLIGRAM(S): at 06:42

## 2019-01-10 RX ADMIN — HEPARIN SODIUM 5000 UNIT(S): 5000 INJECTION INTRAVENOUS; SUBCUTANEOUS at 13:01

## 2019-01-10 NOTE — CONSULT NOTE ADULT - SUBJECTIVE AND OBJECTIVE BOX
Patient is a 68y old  Male who presents with a chief complaint of Left knee and ankle pain (10 Jitendra 2019 14:53)      HPI:  Patient is a 68y Male with  PMH of HTN , HLD , Gout , CAD  presenting with left knee pain and swelling x 2 days. Patient states pain is generalized in left knee radiating down the lower leg and left ankle. . Patient states he had similar pain about 3 weeks ago and was admitted to hospital for same reason. On last admission, he had left knee arthrocentesis done by Orthopedics. About 40cc of fluid was aspirated. Patient diagnosed with gout and was discharged on prednisone and colchicine but his pain came back 2 days back . Denies any CP, SOB, paresthesias. Off note, Pt completed steroid course yesterday  In ED Pt was afebrile with white count with left shift ,likely from steroids use (2019 02:26)        PAST MEDICAL & SURGICAL HISTORY:  CAD (coronary artery disease)  HLD (hyperlipidemia)  Hypertension  Gout  No significant past surgical history      MEDICATIONS  (STANDING):  aspirin enteric coated 81 milliGRAM(s) Oral daily  atorvastatin 40 milliGRAM(s) Oral at bedtime  clopidogrel Tablet 75 milliGRAM(s) Oral daily  enalapril 2.5 milliGRAM(s) Oral daily  heparin  Injectable 5000 Unit(s) SubCutaneous every 8 hours  predniSONE   Tablet 40 milliGRAM(s) Oral daily    MEDICATIONS  (PRN):  acetaminophen   Tablet .. 650 milliGRAM(s) Oral every 6 hours PRN Mild Pain (1 - 3)  oxyCODONE    5 mG/acetaminophen 325 mG 1 Tablet(s) Oral every 6 hours PRN Moderate Pain (4 - 6)      Allergies    No Known Allergies    Intolerances                              11.8   11.2  )-----------( 372      ( 10 Jitendra 2019 12:34 )             36.9       -10    139  |  103  |  22<H>  ----------------------------<  198<H>  4.2   |  24  |  1.01    Ca    8.6      10 Jitendra 2019 10:49  Phos  3.9         TPro  6.8  /  Alb  2.5<L>  /  TBili  0.9  /  DBili  x   /  AST  10  /  ALT  23  /  AlkPhos  78        Urinalysis Basic - ( 2019 11:00 )    Color: Yellow / Appearance: Clear / S.020 / pH: x  Gluc: x / Ketone: Negative  / Bili: Negative / Urobili: Negative   Blood: x / Protein: Negative / Nitrite: Negative   Leuk Esterase: Trace / RBC: 0-2 /HPF / WBC 3-5 /HPF   Sq Epi: x / Non Sq Epi: Few /HPF / Bacteria: Trace /HPF          Vital Signs Last 24 Hrs  T(C): 36.7 (10 Jitendra 2019 12:06), Max: 36.7 (10 Jitendra 2019 12:06)  T(F): 98.1 (10 Jitendra 2019 12:06), Max: 98.1 (10 Jitendra 2019 12:06)  HR: 95 (10 Jitendra 2019 12:06) (89 - 95)  BP: 150/76 (10 Jitendra 2019 12:06) (124/91 - 150/76)  BP(mean): --  RR: 18 (10 Jitendra 2019 12:06) (16 - 18)  SpO2: 99% (10 Jitendra 2019 12:06) (95% - 100%)    Physical Exam  Constitutional:Pt. complains of some left knee discomfort    ENMT:    Respiratory: clear    Cardiovascular: [id number]    Gastrointestinal: +BS soft nont    Extremities: -C/C/E    Skin:i ntact    Musculoskeletal: =Sweliing left knee with decreased ROM with flexion and extension

## 2019-01-10 NOTE — PHYSICAL THERAPY INITIAL EVALUATION ADULT - PERTINENT HX OF CURRENT PROBLEM, REHAB EVAL
Patient was brought to ED from home with pain and swelling on L knee. Patient currently has swelling and pain on B feet

## 2019-01-10 NOTE — PHYSICAL THERAPY INITIAL EVALUATION ADULT - DIAGNOSIS, PT EVAL
Patient presented with pain and swelling on R knee, B feet, pain with AROM impairing patient's ability to perform steady ambulation at this time

## 2019-01-10 NOTE — PROGRESS NOTE ADULT - SUBJECTIVE AND OBJECTIVE BOX
Patient is a 68y Male with  PMH of HTN , HLD , Gout , CAD  presenting with left knee pain and swelling x 2 days. Patient states pain is generalized in left knee radiating down the lower leg and left ankle. . Patient states he had similar pain about 3 weeks ago and was admitted to hospital for same reason. On last admission, he had left knee arthrocentesis done by Orthopedics. About 40cc of fluid was aspirated. Patient diagnosed with gout and was discharged on prednisone and colchicine but his pain came back after prednisone course completed.    ESR is 81 and RF is 16. BC are negative preliminarily

## 2019-01-11 ENCOUNTER — TRANSCRIPTION ENCOUNTER (OUTPATIENT)
Age: 69
End: 2019-01-11

## 2019-01-11 DIAGNOSIS — M25.571 PAIN IN RIGHT ANKLE AND JOINTS OF RIGHT FOOT: ICD-10-CM

## 2019-01-11 LAB
ANION GAP SERPL CALC-SCNC: 8 MMOL/L — SIGNIFICANT CHANGE UP (ref 5–17)
BASOPHILS # BLD AUTO: 0 K/UL — SIGNIFICANT CHANGE UP (ref 0–0.2)
BASOPHILS NFR BLD AUTO: 0.4 % — SIGNIFICANT CHANGE UP (ref 0–2)
BUN SERPL-MCNC: 20 MG/DL — HIGH (ref 7–18)
CALCIUM SERPL-MCNC: 8.5 MG/DL — SIGNIFICANT CHANGE UP (ref 8.4–10.5)
CCP IGG SERPL-ACNC: <8 UNITS — SIGNIFICANT CHANGE UP (ref 0–19)
CHLORIDE SERPL-SCNC: 106 MMOL/L — SIGNIFICANT CHANGE UP (ref 96–108)
CK MB BLD-MCNC: <1.9 % — SIGNIFICANT CHANGE UP (ref 0–3.5)
CK MB CFR SERPL CALC: <1 NG/ML — SIGNIFICANT CHANGE UP (ref 0–3.6)
CK SERPL-CCNC: 52 U/L — SIGNIFICANT CHANGE UP (ref 35–232)
CO2 SERPL-SCNC: 26 MMOL/L — SIGNIFICANT CHANGE UP (ref 22–31)
CREAT SERPL-MCNC: 0.9 MG/DL — SIGNIFICANT CHANGE UP (ref 0.5–1.3)
EOSINOPHIL # BLD AUTO: 0.1 K/UL — SIGNIFICANT CHANGE UP (ref 0–0.5)
EOSINOPHIL NFR BLD AUTO: 1.6 % — SIGNIFICANT CHANGE UP (ref 0–6)
GLUCOSE BLDC GLUCOMTR-MCNC: 104 MG/DL — HIGH (ref 70–99)
GLUCOSE SERPL-MCNC: 86 MG/DL — SIGNIFICANT CHANGE UP (ref 70–99)
HCT VFR BLD CALC: 36.2 % — LOW (ref 39–50)
HCT VFR BLD CALC: 38 % — LOW (ref 39–50)
HGB BLD-MCNC: 11.6 G/DL — LOW (ref 13–17)
HGB BLD-MCNC: 12.1 G/DL — LOW (ref 13–17)
LYMPHOCYTES # BLD AUTO: 0.5 K/UL — LOW (ref 1–3.3)
LYMPHOCYTES # BLD AUTO: 5.9 % — LOW (ref 13–44)
MCHC RBC-ENTMCNC: 25.7 PG — LOW (ref 27–34)
MCHC RBC-ENTMCNC: 25.8 PG — LOW (ref 27–34)
MCHC RBC-ENTMCNC: 31.8 GM/DL — LOW (ref 32–36)
MCHC RBC-ENTMCNC: 31.9 GM/DL — LOW (ref 32–36)
MCV RBC AUTO: 80.7 FL — SIGNIFICANT CHANGE UP (ref 80–100)
MCV RBC AUTO: 80.9 FL — SIGNIFICANT CHANGE UP (ref 80–100)
MONOCYTES # BLD AUTO: 0.3 K/UL — SIGNIFICANT CHANGE UP (ref 0–0.9)
MONOCYTES NFR BLD AUTO: 3.9 % — SIGNIFICANT CHANGE UP (ref 2–14)
NEUTROPHILS # BLD AUTO: 7.7 K/UL — HIGH (ref 1.8–7.4)
NEUTROPHILS NFR BLD AUTO: 88.2 % — HIGH (ref 43–77)
PLATELET # BLD AUTO: 318 K/UL — SIGNIFICANT CHANGE UP (ref 150–400)
PLATELET # BLD AUTO: 352 K/UL — SIGNIFICANT CHANGE UP (ref 150–400)
POTASSIUM SERPL-MCNC: 4.3 MMOL/L — SIGNIFICANT CHANGE UP (ref 3.5–5.3)
POTASSIUM SERPL-SCNC: 4.3 MMOL/L — SIGNIFICANT CHANGE UP (ref 3.5–5.3)
RBC # BLD: 4.48 M/UL — SIGNIFICANT CHANGE UP (ref 4.2–5.8)
RBC # BLD: 4.71 M/UL — SIGNIFICANT CHANGE UP (ref 4.2–5.8)
RBC # FLD: 14 % — SIGNIFICANT CHANGE UP (ref 10.3–14.5)
RBC # FLD: 14 % — SIGNIFICANT CHANGE UP (ref 10.3–14.5)
RF+CCP IGG SER-IMP: NEGATIVE — SIGNIFICANT CHANGE UP
SODIUM SERPL-SCNC: 140 MMOL/L — SIGNIFICANT CHANGE UP (ref 135–145)
TROPONIN I SERPL-MCNC: <0.015 NG/ML — SIGNIFICANT CHANGE UP (ref 0–0.04)
WBC # BLD: 11.6 K/UL — HIGH (ref 3.8–10.5)
WBC # BLD: 8.7 K/UL — SIGNIFICANT CHANGE UP (ref 3.8–10.5)
WBC # FLD AUTO: 11.6 K/UL — HIGH (ref 3.8–10.5)
WBC # FLD AUTO: 8.7 K/UL — SIGNIFICANT CHANGE UP (ref 3.8–10.5)

## 2019-01-11 PROCEDURE — 71275 CT ANGIOGRAPHY CHEST: CPT | Mod: 26

## 2019-01-11 PROCEDURE — 99223 1ST HOSP IP/OBS HIGH 75: CPT

## 2019-01-11 PROCEDURE — 93010 ELECTROCARDIOGRAM REPORT: CPT

## 2019-01-11 RX ORDER — ALLOPURINOL 300 MG
1 TABLET ORAL
Qty: 28 | Refills: 0 | OUTPATIENT
Start: 2019-01-11 | End: 2019-01-24

## 2019-01-11 RX ORDER — SENNA PLUS 8.6 MG/1
2 TABLET ORAL AT BEDTIME
Qty: 0 | Refills: 0 | Status: DISCONTINUED | OUTPATIENT
Start: 2019-01-11 | End: 2019-01-15

## 2019-01-11 RX ORDER — COLCHICINE 0.6 MG
1 TABLET ORAL
Qty: 28 | Refills: 0 | OUTPATIENT
Start: 2019-01-11 | End: 2019-01-24

## 2019-01-11 RX ORDER — ASPIRIN/CALCIUM CARB/MAGNESIUM 324 MG
1 TABLET ORAL
Qty: 14 | Refills: 0
Start: 2019-01-11 | End: 2019-01-24

## 2019-01-11 RX ORDER — ERGOCALCIFEROL 1.25 MG/1
1 CAPSULE ORAL
Qty: 2 | Refills: 0 | OUTPATIENT
Start: 2019-01-11 | End: 2019-01-24

## 2019-01-11 RX ORDER — LACTULOSE 10 G/15ML
15 SOLUTION ORAL
Qty: 0 | Refills: 0 | Status: DISCONTINUED | OUTPATIENT
Start: 2019-01-11 | End: 2019-01-15

## 2019-01-11 RX ORDER — ACETAMINOPHEN 500 MG
1000 TABLET ORAL ONCE
Qty: 0 | Refills: 0 | Status: COMPLETED | OUTPATIENT
Start: 2019-01-12 | End: 2019-01-12

## 2019-01-11 RX ORDER — CLOPIDOGREL BISULFATE 75 MG/1
1 TABLET, FILM COATED ORAL
Qty: 14 | Refills: 0 | OUTPATIENT
Start: 2019-01-11 | End: 2019-01-24

## 2019-01-11 RX ORDER — ATORVASTATIN CALCIUM 80 MG/1
1 TABLET, FILM COATED ORAL
Qty: 14 | Refills: 0 | OUTPATIENT
Start: 2019-01-11 | End: 2019-01-24

## 2019-01-11 RX ORDER — ERGOCALCIFEROL 1.25 MG/1
1 CAPSULE ORAL
Qty: 2 | Refills: 0
Start: 2019-01-11 | End: 2019-01-24

## 2019-01-11 RX ORDER — KETOROLAC TROMETHAMINE 30 MG/ML
30 SYRINGE (ML) INJECTION EVERY 8 HOURS
Qty: 0 | Refills: 0 | Status: DISCONTINUED | OUTPATIENT
Start: 2019-01-11 | End: 2019-01-12

## 2019-01-11 RX ORDER — POLYETHYLENE GLYCOL 3350 17 G/17G
17 POWDER, FOR SOLUTION ORAL
Qty: 0 | Refills: 0 | Status: DISCONTINUED | OUTPATIENT
Start: 2019-01-11 | End: 2019-01-15

## 2019-01-11 RX ORDER — ASPIRIN/CALCIUM CARB/MAGNESIUM 324 MG
1 TABLET ORAL
Qty: 14 | Refills: 0 | OUTPATIENT
Start: 2019-01-11 | End: 2019-01-24

## 2019-01-11 RX ORDER — ONDANSETRON 8 MG/1
4 TABLET, FILM COATED ORAL ONCE
Qty: 0 | Refills: 0 | Status: COMPLETED | OUTPATIENT
Start: 2019-01-11 | End: 2019-01-11

## 2019-01-11 RX ORDER — DOCUSATE SODIUM 100 MG
100 CAPSULE ORAL
Qty: 0 | Refills: 0 | Status: DISCONTINUED | OUTPATIENT
Start: 2019-01-11 | End: 2019-01-15

## 2019-01-11 RX ADMIN — Medication 30 MILLIGRAM(S): at 13:36

## 2019-01-11 RX ADMIN — HEPARIN SODIUM 5000 UNIT(S): 5000 INJECTION INTRAVENOUS; SUBCUTANEOUS at 05:29

## 2019-01-11 RX ADMIN — Medication 200 MILLIGRAM(S): at 23:01

## 2019-01-11 RX ADMIN — ONDANSETRON 4 MILLIGRAM(S): 8 TABLET, FILM COATED ORAL at 23:01

## 2019-01-11 RX ADMIN — OXYCODONE AND ACETAMINOPHEN 1 TABLET(S): 5; 325 TABLET ORAL at 11:42

## 2019-01-11 RX ADMIN — Medication 2.5 MILLIGRAM(S): at 05:29

## 2019-01-11 RX ADMIN — CLOPIDOGREL BISULFATE 75 MILLIGRAM(S): 75 TABLET, FILM COATED ORAL at 11:42

## 2019-01-11 RX ADMIN — ATORVASTATIN CALCIUM 40 MILLIGRAM(S): 80 TABLET, FILM COATED ORAL at 22:54

## 2019-01-11 RX ADMIN — LACTULOSE 15 GRAM(S): 10 SOLUTION ORAL at 13:37

## 2019-01-11 RX ADMIN — OXYCODONE AND ACETAMINOPHEN 1 TABLET(S): 5; 325 TABLET ORAL at 21:22

## 2019-01-11 RX ADMIN — HEPARIN SODIUM 5000 UNIT(S): 5000 INJECTION INTRAVENOUS; SUBCUTANEOUS at 13:37

## 2019-01-11 RX ADMIN — HEPARIN SODIUM 5000 UNIT(S): 5000 INJECTION INTRAVENOUS; SUBCUTANEOUS at 22:55

## 2019-01-11 RX ADMIN — POLYETHYLENE GLYCOL 3350 17 GRAM(S): 17 POWDER, FOR SOLUTION ORAL at 17:45

## 2019-01-11 RX ADMIN — Medication 30 MILLIGRAM(S): at 14:00

## 2019-01-11 RX ADMIN — OXYCODONE AND ACETAMINOPHEN 1 TABLET(S): 5; 325 TABLET ORAL at 01:48

## 2019-01-11 RX ADMIN — Medication 81 MILLIGRAM(S): at 11:42

## 2019-01-11 RX ADMIN — Medication 100 MILLIGRAM(S): at 17:44

## 2019-01-11 RX ADMIN — Medication 30 MILLIGRAM(S): at 22:59

## 2019-01-11 RX ADMIN — Medication 30 MILLIGRAM(S): at 23:55

## 2019-01-11 RX ADMIN — OXYCODONE AND ACETAMINOPHEN 1 TABLET(S): 5; 325 TABLET ORAL at 00:41

## 2019-01-11 RX ADMIN — SENNA PLUS 2 TABLET(S): 8.6 TABLET ORAL at 22:54

## 2019-01-11 RX ADMIN — OXYCODONE AND ACETAMINOPHEN 1 TABLET(S): 5; 325 TABLET ORAL at 20:43

## 2019-01-11 RX ADMIN — OXYCODONE AND ACETAMINOPHEN 1 TABLET(S): 5; 325 TABLET ORAL at 12:15

## 2019-01-11 NOTE — DISCHARGE NOTE ADULT - ADDITIONAL INSTRUCTIONS
Please call and make an appointment with your primary medical doctor within 1 week of discharge. Please call and make an appointment with your primary medical doctor within 1 week of discharge.  You are discharged on a Prednisone taper, please take all medication as prescribed.

## 2019-01-11 NOTE — CONSULT NOTE ADULT - SUBJECTIVE AND OBJECTIVE BOX
GANESH GANDHI  68y  Male      Patient is a 68y old  Male who presents with a chief complaint of Left knee and ankle pain (11 Jan 2019 13:02).,  Patient is a 68y Male with  PMH of HTN , HLD , Gout , CAD  presenting with left knee pain and swelling x 2 days. Patient states pain is generalized in left knee radiating down the lower leg and left ankle. . Patient states he had similar pain about 3 weeks ago and was admitted to hospital for same reason. On last admission, he had left knee arthrocentesis done by Orthopedics. About 40cc of fluid was aspirated. Patient diagnosed with gout and was discharged on prednisone and colchicine but his pain came back 2 days back . Denies any CP, SOB, paresthesias. Off note, Pt completed steroid course yesterday  In ED Pt was afebrile with white count with left shift ,likely from steroids use.      68 year old male with left knee and right ankle pain present to ed.  Pt was admitted 3 weeks for similar symptoms and dr. sibley performed an arthrocentesis and injected steroids.  Pt was diagnosed with gout and discharged home on prednisone.  However, pt states that pain came back after a few days.  + left knee pain. Pain radiates down leg.  + right ankle pain.  + edema - left knee and right ankle.      PAST MEDICAL/SURGICAL HISTORY  PAST MEDICAL & SURGICAL HISTORY:  CAD (coronary artery disease)  HLD (hyperlipidemia)  Hypertension  Gout  No significant past surgical history      REVIEW OF SYSTEMS:  CONSTITUTIONAL: No fever, weight loss, or fatigue  RESPIRATORY: No cough, wheezing, chills or hemoptysis; No shortness of breath  CARDIOVASCULAR: No chest pain, palpitations, dizziness, or leg swelling  GASTROINTESTINAL: No abdominal or epigastric pain. No nausea, vomiting, or hematemesis; No diarrhea or constipation  GENITOURINARY: No dysuria, frequency, hematuria, or incontinence  NEUROLOGICAL: No headaches, memory loss, loss of strength, numbness, or tremors  MUSCULOSKELETAL: + left knee pain      T(C): 36.8 (01-11-19 @ 12:20), Max: 36.8 (01-11-19 @ 12:20)  HR: 95 (01-11-19 @ 12:20) (80 - 107)  BP: 127/97 (01-11-19 @ 12:20) (116/71 - 130/74)  RR: 18 (01-11-19 @ 12:20) (18 - 18)  SpO2: 99% (01-11-19 @ 12:20) (96% - 99%)  Wt(kg): --Vital Signs Last 24 Hrs  T(C): 36.8 (11 Jan 2019 12:20), Max: 36.8 (11 Jan 2019 12:20)  T(F): 98.3 (11 Jan 2019 12:20), Max: 98.3 (11 Jan 2019 12:20)  HR: 95 (11 Jan 2019 12:20) (80 - 107)  BP: 127/97 (11 Jan 2019 12:20) (116/71 - 130/74)  BP(mean): --  RR: 18 (11 Jan 2019 12:20) (18 - 18)  SpO2: 99% (11 Jan 2019 12:20) (96% - 99%)    PHYSICAL EXAM:  GENERAL: NAD, well-groomed, well-developed  NERVOUS SYSTEM:  Alert & Oriented X3, Good concentration;   CHEST/LUNG: Clear to percussion bilaterally; No rales, rhonchi, wheezing, or rubs  HEART: Regular rate and rhythm; No murmurs, rubs, or gallops  ABDOMEN: Soft, Nontender, Nondistended; Bowel sounds present  EXTREMITIES:  2+ Peripheral Pulses, No clubbing, cyanosis, or edema  MUSCULOSKELETAL: + left knee tenderness, + decreased rom      Consultant(s) Notes Reviewed:  [x ] YES  [ ] NO  Care Discussed with Consultants/Other Providers [ x] YES  [ ] NO  ISTOP [ ] Yes  [  ] No      LABS:  CBC   01-11-19 @ 07:31  Hematcorit 36.2  Hemoglobin 11.6  Mean Cell Hemoglobin 25.8  Platelet Count-Automated 352  RBC Count 4.48  Red Cell Distrib Width 14.0  Wbc Count 11.6      BMP  01-11-19 @ 07:31  Anion Gap. Serum 8  Blood Urea Nitrogen,Serm 20  Calcium, Total Serum 8.5  Carbon Dioxide, Serum 26  Chloride, Serum 106  Creatinine, Serum 0.90  eGFR in  102  eGFR in Non Afican American 88  Gloucose, serum 86  Potassium, Serum 4.3  Sodium, Serum 140              01-10-19 @ 10:49  Anion Gap. Serum 12  Blood Urea Nitrogen,Serm 22  Calcium, Total Serum 8.6  Carbon Dioxide, Serum 24  Chloride, Serum 103  Creatinine, Serum 1.01  eGFR in  89  eGFR in Non Afican American 77  Gloucose, serum 198  Potassium, Serum 4.2  Sodium, Serum 139              01-09-19 @ 10:48  Anion Gap. Serum 10  Blood Urea Nitrogen,Serm 33  Calcium, Total Serum 8.2  Carbon Dioxide, Serum 25  Chloride, Serum 107  Creatinine, Serum 1.06  eGFR in  84  eGFR in Non Afican American 72  Gloucose, serum 89  Potassium, Serum 3.9  Sodium, Serum 142                  CMP  01-11-19 @ 07:31  Susan Aminotransferase(ALT/SGPT)--  Albumin, Serum --  Alkaline Phosphatase, Serum --  Anion Gap, Serum 8  Aspartate Aminotransferase (AST/SGOT)--  Bilirubin Total, Serum --  Blood Urea Nitrogen, Serum 20  Calcium,Total Serum 8.5  Carbon Dioxide, Serum 26  Chloride, Serum 106  Creatinine, Serum 0.90  eGFR if  102  eGFR if Non African American 88  Glucose, Serum 86  Potassium, Serum 4.3  Protein Total, Serum --  Sodium, Serum 140                      01-10-19 @ 10:49  Susan Aminotransferase(ALT/SGPT)--  Albumin, Serum --  Alkaline Phosphatase, Serum --  Anion Gap, Serum 12  Aspartate Aminotransferase (AST/SGOT)--  Bilirubin Total, Serum --  Blood Urea Nitrogen, Serum 22  Calcium,Total Serum 8.6  Carbon Dioxide, Serum 24  Chloride, Serum 103  Creatinine, Serum 1.01  eGFR if  89  eGFR if Non African American 77  Glucose, Serum 198  Potassium, Serum 4.2  Protein Total, Serum --  Sodium, Serum 139                      01-09-19 @ 10:48  Susan Aminotransferase(ALT/SGPT)23  Albumin, Serum 2.5  Alkaline Phosphatase, Serum 78  Anion Gap, Serum 10  Aspartate Aminotransferase (AST/SGOT)10  Bilirubin Total, Serum 0.9  Blood Urea Nitrogen, Serum 33  Calcium,Total Serum 8.2  Carbon Dioxide, Serum 25  Chloride, Serum 107  Creatinine, Serum 1.06  eGFR if  84  eGFR if Non African American 72  Glucose, Serum 89  Potassium, Serum 3.9  Protein Total, Serum 6.8  Sodium, Serum 142                          PT/INR      Amylase/Lipase            RADIOLOGY & ADDITIONAL TESTS:    Imaging Personally Reviewed:  [ ] YES  [ ] NO

## 2019-01-11 NOTE — CHART NOTE - NSCHARTNOTEFT_GEN_A_CORE
Spoke with [facility] radiologist over the phone for supspected PE. He says Study is limited due to motion but no initial evidence of PE at this time. Awaiting official prelim report

## 2019-01-11 NOTE — DISCHARGE NOTE ADULT - HOSPITAL COURSE
Patient is a 68y Male with  PMH of HTN , HLD , Gout , CAD  presenting with left knee pain and swelling x 2 days. Patient states pain is generalized in left knee radiating down the lower leg and left ankle. . Patient states he had similar pain about 3 weeks ago and was admitted to hospital for same reason. On last admission, he had left knee arthrocentesis done by Orthopedics. About 40cc of fluid was aspirated. Patient diagnosed with gout and was discharged on prednisone and colchicine but his pain came back after prednisone course completed. Patient is a 68y Male with  PMH of HTN , HLD , Gout , CAD  presenting with left knee pain and swelling x 2 days. Patient states pain is generalized in left knee radiating down the lower leg and left ankle. . Patient states he had similar pain about 3 weeks ago and was admitted to hospital for same reason. On last admission, he had left knee arthrocentesis done by Orthopedics. About 40cc of fluid was aspirated. Patient diagnosed with gout and was discharged on prednisone and colchicine but his pain came back after prednisone course completed. Treated for knee pain, physical therapy recommends home physical therapy, discharged on prednisone taper with follow up with primary medical doctor as outpatient. Patient is a 68y Male with  PMH of HTN , HLD , Gout , CAD  presenting with left knee pain and swelling x 2 days. Patient states pain is generalized in left knee radiating down the lower leg and left ankle. . Patient states he had similar pain about 3 weeks ago and was admitted to hospital for same reason. On last admission, he had left knee arthrocentesis done by Orthopedics. About 40cc of fluid was aspirated. Patient diagnosed with gout and was discharged on prednisone and colchicine but his pain came back after prednisone course completed. Treated for knee pain, physical therapy recommends home physical therapy, discharged on prednisone taper with follow up with primary medical doctor as outpatient. Please call and make an appointment with [facility], [address] [place] [address] [phone].    We recommend you follow as outpatient for possible arthroscopy of your left knee as recommended by orthopedics during this admission. If you need information on who to see, please ask the doctors at the [facility]. Patient is a 68y Male with  PMH of HTN , HLD , Gout , CAD  presenting with left knee pain and swelling x 2 days. Patient states pain is generalized in left knee radiating down the lower leg and left ankle. . Patient states he had similar pain about 3 weeks ago and was admitted to hospital for same reason. On last admission, he had left knee arthrocentesis done by Orthopedics. About 40cc of fluid was aspirated. Patient diagnosed with gout and was discharged on prednisone and colchicine but his pain came back after prednisone course completed. Treated for knee pain, physical therapy recommends home physical therapy, discharged on prednisone taper with follow up with primary medical doctor as outpatient.  During course of admission, pt complained of chest pain.  CT angio done due to pt's immobility.  It was negative as well as troponins.  Pt underwent echo.  Pt to have meds given by [facility] and follow up at [facility].   We recommend you follow as outpatient for possible arthroscopy of your left knee as recommended by orthopedics during this admission. If you need information on who to see, please ask the doctors at the [facility]. Patient is a 68y Male with  PMH of HTN , HLD , Gout , CAD  presenting with left knee pain and swelling x 2 days. Patient states pain is generalized in left knee radiating down the lower leg and left ankle. . Patient states he had similar pain about 3 weeks ago and was admitted to hospital for same reason. On last admission, he had left knee arthrocentesis done by Orthopedics. About 40cc of fluid was aspirated. Patient diagnosed with gout and was discharged on prednisone and colchicine but his pain came back after prednisone course completed. Treated for knee pain, physical therapy recommends home physical therapy, discharged on prednisone taper with follow up with primary medical doctor as outpatient.  During course of admission, pt complained of chest pain.  CT angio done due to pt's immobility.  It was negative as well as troponins.  Pt underwent echo.  Pt to have meds given by [facility] and follow up at [facility].   We recommend  follow us as outpatient for possible arthroscopy of left knee as recommended by orthopedics during this admission.

## 2019-01-11 NOTE — DISCHARGE NOTE ADULT - PLAN OF CARE
Improving a/w knee pain and swellign x2 day   s/p arthrocentesis December 2018  Afebrile, started on Prednisone d/c on Allopurinol   Physical Therapy recommends Home PT Continue current medications, BP controlled Continue Lipitor a/w knee pain and swellign x2 day   s/p arthrocentesis December 2018  Afebrile, started on Prednisone. Discharged on Prednisone taper d/c on Allopurinol, Colchicine and Naprosyn   Physical Therapy recommends Home PT a/w knee pain and swelling x2 day   s/p arthrocentesis December 2018  Afebrile, started on Prednisone. Discharged on Prednisone taper a/w knee pain and swelling x2 day   s/p arthrocentesis December 2018  Afebrile, started on Prednisone. Discharged on Prednisone taper    Please continue all your medications as prescribed. Follow up with orthopedic doctor in [facility] continue  Allopurinol, Colchicine and Naprosyn   Physical Therapy recommends Home PT Please take all the medications as prescribed including Indomethacin,  gabapentin and Percocet. Follow up tomorrow  in [place] clinic as scheduled for further management and to initiate  maintenance gout treatment Follow up tomorrow with the doctor as scheduled for further management  Avoid food such as red meat, alcohol, processed food, cheese and ather high purine food Continue enalapril 2.5 mg daily and monitor blood pressure   Continue low salt and low fat diet   Follow up with primary care doctor for further management continue atrovastatin as prescribed. Follow up with PCP continue plavix and aspirin 81 mg Appointment at [facility] on Wednesday, 1/16/19 at 2PM  Please make sure you see the doctor tomorrow for further management

## 2019-01-11 NOTE — CHART NOTE - NSCHARTNOTEFT_GEN_A_CORE
Per my conversation with Dr. Barxton and ASHLEY Santiago, patients medications submitted to [facility] pharmacy for sarah approval. Patient will be seen by pain management for knee pain.     Jhoana Martinez, [facility]  [phone]

## 2019-01-11 NOTE — CONSULT NOTE ADULT - PROBLEM SELECTOR RECOMMENDATION 9
- continue prednisone as ordered  - toradol 30mg ivp q 8 hours as ordered  -colchine not started  - allopurinol after discharge  - orthopedics not to do any  arthrocentesis - pt is on plavix.  Pt also received a dose of steroids 3 weeks ago  - lida
1 Continue current tx consider othopedic consult for arthrocentesis and steroid injection if pain and swelling continues.

## 2019-01-11 NOTE — DISCHARGE NOTE ADULT - PROVIDER TOKENS
TOKSHAR:'[id number]' FREE:[LAST:[[facility]],PHONE:[phone]],FAX:[(   [phone]    -],ADDRESS:[[address] [place] [address]]]

## 2019-01-11 NOTE — PROGRESS NOTE ADULT - SUBJECTIVE AND OBJECTIVE BOX
ID #[id number]  68y Male    Subjective: Pt was seen and examined at bedside, pt is not in distress, complaining of left knee pain and b/l ankle pain, leukocytosis 2/2 to ? steroid, vitals are stable, ESR is 81, RA factor elevated to 16 (non specific), blood culture is negative.    Meds:    Allergies    No Known Allergies    Intolerances        VITALS:  Vital Signs Last 24 Hrs  T(C): 36.2 (11 Jan 2019 04:40), Max: 36.7 (10 Jitendra 2019 12:06)  T(F): 97.2 (11 Jan 2019 04:40), Max: 98.1 (10 Jitendra 2019 12:06)  HR: 80 (11 Jan 2019 04:40) (80 - 107)  BP: 130/74 (11 Jan 2019 04:40) (116/71 - 150/76)  BP(mean): --  RR: 18 (11 Jan 2019 04:40) (18 - 18)  SpO2: 96% (11 Jan 2019 04:40) (96% - 99%)    LABS/DIAGNOSTIC TESTS:                          11.6   11.6  )-----------( 352      ( 11 Jan 2019 07:31 )             36.2         01-11    140  |  106  |  20<H>  ----------------------------<  86  4.3   |  26  |  0.90    Ca    8.5      11 Jan 2019 07:31    CULTURES: .Blood Blood  01-09 @ 00:36   No growth to date.  --  --    RADIOLOGY:    EXAM:  MR KNEE LT                            PROCEDURE DATE:  01/09/2019          INTERPRETATION:  EXAMINATION: MRI of the left knee    HISTORY: Left knee pain with effusion    TECHNIQUE: Multiplanar, multisequential MR imaging was performed.    FINDINGS:     Fluid: There is a moderate-sized joint effusion with synovitis and   hemorrhagic debris    Ligaments: There is moderate grade partial tearing of the PCL with   periligamentous edema. Moderate mucinous degeneration of the ACL without   tear. The collateral ligaments are intact.    Medial Compartment: No medial meniscal tear. There is partial   meniscocapsular separation at the inner aspect of the posterior horn of   the medial meniscus. Preserved articular cartilage.    Lateral Compartment: No lateral meniscal tear. Preserved articular   cartilage.    Patellofemoral Compartment: There is focal area of high-grade chondral   wear along the central to medial aspect of the trochlear sulcus with   superimposed foci of deep chondral fissuring and underlying subchondral   cystic change and marrow edema.    Extensor Mechanism: No tear of the quadriceps or patellar tendons.    Bones: There is no fracture or osteonecrosis.    IMPRESSION: Moderate-sized joint effusion with hemorrhagic debris. No   fracture.    Moderate grade partial tearing of the PCL with periligamentous edema.   Moderate mucinous degeneration of the ACL without tear    Focal area of chondral wear and fissuring along the central trochlear   sulcus.    Partial meniscocapsular separation at the inner aspect of the posterior   horn of the medial meniscus.                        KASSANDRA PLASENCIA M.D., ATTENDING RADIOLOGIST  This document has been electronically signed. Jan 9 2019  9:57AM  ID #[id number]  68y Male    Subjective: Pt was seen and examined at bedside, pt is not in distress, complaining of left knee pain and b/l ankle pain, leukocytosis 2/2 to ? steroid, vitals are stable, ESR is 81, RA factor elevated to 16 (non specific), blood culture is negative. Pt is doing better but still has left knee pain.    Meds:    Allergies    No Known Allergies    Intolerances        VITALS:  Vital Signs Last 24 Hrs  T(C): 36.2 (11 Jan 2019 04:40), Max: 36.7 (10 Jitendra 2019 12:06)  T(F): 97.2 (11 Jan 2019 04:40), Max: 98.1 (10 Jitendra 2019 12:06)  HR: 80 (11 Jan 2019 04:40) (80 - 107)  BP: 130/74 (11 Jan 2019 04:40) (116/71 - 150/76)  BP(mean): --  RR: 18 (11 Jan 2019 04:40) (18 - 18)  SpO2: 96% (11 Jan 2019 04:40) (96% - 99%)    LABS/DIAGNOSTIC TESTS:                          11.6   11.6  )-----------( 352      ( 11 Jan 2019 07:31 )             36.2         01-11    140  |  106  |  20<H>  ----------------------------<  86  4.3   |  26  |  0.90    Ca    8.5      11 Jan 2019 07:31    CULTURES: .Blood Blood  01-09 @ 00:36   No growth to date.  --  --    RADIOLOGY:    EXAM:  MR KNEE LT                            PROCEDURE DATE:  01/09/2019          INTERPRETATION:  EXAMINATION: MRI of the left knee    HISTORY: Left knee pain with effusion    TECHNIQUE: Multiplanar, multisequential MR imaging was performed.    FINDINGS:     Fluid: There is a moderate-sized joint effusion with synovitis and   hemorrhagic debris    Ligaments: There is moderate grade partial tearing of the PCL with   periligamentous edema. Moderate mucinous degeneration of the ACL without   tear. The collateral ligaments are intact.    Medial Compartment: No medial meniscal tear. There is partial   meniscocapsular separation at the inner aspect of the posterior horn of   the medial meniscus. Preserved articular cartilage.    Lateral Compartment: No lateral meniscal tear. Preserved articular   cartilage.    Patellofemoral Compartment: There is focal area of high-grade chondral   wear along the central to medial aspect of the trochlear sulcus with   superimposed foci of deep chondral fissuring and underlying subchondral   cystic change and marrow edema.    Extensor Mechanism: No tear of the quadriceps or patellar tendons.    Bones: There is no fracture or osteonecrosis.    IMPRESSION: Moderate-sized joint effusion with hemorrhagic debris. No   fracture.    Moderate grade partial tearing of the PCL with periligamentous edema.   Moderate mucinous degeneration of the ACL without tear    Focal area of chondral wear and fissuring along the central trochlear   sulcus.    Partial meniscocapsular separation at the inner aspect of the posterior   horn of the medial meniscus.                        KASSANDRA PLASENCIA M.D., ATTENDING RADIOLOGIST  This document has been electronically signed. Jan 9 2019  9:57AM

## 2019-01-11 NOTE — DISCHARGE NOTE ADULT - CARE PROVIDER_API CALL
Albert Braxton), [place] [address]  Phone: [phone]  Fax: [phone] [facility],   [address] [place], [place]  Phone: [phone]  Fax: (   )    -

## 2019-01-11 NOTE — DISCHARGE NOTE ADULT - MEDICATION SUMMARY - MEDICATIONS TO TAKE
I will START or STAY ON the medications listed below when I get home from the hospital:    predniSONE 10 mg oral tablet  -- 4 tab(s) oral - by mouth once a day x 3 days  3 tab(s) oral - by mouth once a day x 3 days  2 tab(s) oral - by mouth once a day x 3 days  1 tab(s) oral - by mouth once a day x 3 days  -- It is very important that you take or use this exactly as directed.  Do not skip doses or discontinue unless directed by your doctor.  Obtain medical advice before taking any non-prescription drugs as some may affect the action of this medication.  Take with food or milk.    -- Indication: For Steroids     aspirin 81 mg oral delayed release tablet  -- 1 tab(s) by mouth once a day  -- Indication: For CAD (coronary artery disease)    enalapril 2.5 mg oral tablet  -- 1 tab(s) by mouth once a day  -- Indication: For High Blood Pressure    colchicine 0.6 mg oral tablet  -- 1 tab(s) by mouth every 12 hours  -- Indication: For Gout    atorvastatin 40 mg oral tablet  -- 1 tab(s) by mouth once a day (at bedtime)  -- Indication: For Cholesterol    clopidogrel 75 mg oral tablet  -- 1 tab(s) by mouth once a day  -- Indication: For Antiplatelet     ergocalciferol 50,000 intl units (1.25 mg) oral capsule  -- 1 cap(s) by mouth once a week  -- Indication: For Supplement I will START or STAY ON the medications listed below when I get home from the hospital:    predniSONE 10 mg oral tablet  -- 4 tab(s)l - one a day x 3 days  3 tab(s)l - once a day x 3 days  2 tab(s) oral -  once a day x 3 days  1 tab(s) oral -  once a day x 3 days  -- It is very important that you take or use this exactly as directed.  Do not skip doses or discontinue unless directed by your doctor.  Obtain medical advice before taking any non-prescription drugs as some may affect the action of this medication.  Take with food or milk.    -- Indication: For steroids    naproxen 375 mg oral tablet  -- 1 tab(s) by mouth 3 times a day   -- Check with your doctor before becoming pregnant.  It is very important that you take or use this exactly as directed.  Do not skip doses or discontinue unless directed by your doctor.  May cause drowsiness or dizziness.  Obtain medical advice before taking any non-prescription drugs as some may affect the action of this medication.  Take with food or milk.    -- Indication: For pain    aspirin 81 mg oral delayed release tablet  -- 1 tab(s) by mouth once a day  -- Indication: For CAD (coronary artery disease)    enalapril 2.5 mg oral tablet  -- 1 tab(s) by mouth once a day  -- Indication: For High blood pressure    allopurinol 100 mg oral tablet  -- 1 tab(s) by mouth 2 times a day   -- It is very important that you take or use this exactly as directed.  Do not skip doses or discontinue unless directed by your doctor.  May cause drowsiness or dizziness.  Medication should be taken with plenty of water.    -- Indication: For Gout    colchicine 0.6 mg oral tablet  -- 1 tab(s) by mouth every 12 hours  -- Indication: For Gout    atorvastatin 40 mg oral tablet  -- 1 tab(s) by mouth once a day (at bedtime)  -- Indication: For Cholesterol    clopidogrel 75 mg oral tablet  -- 1 tab(s) by mouth once a day  -- Indication: For Antiplatelet    ergocalciferol 50,000 intl units (1.25 mg) oral capsule  -- 1 cap(s) by mouth once a week  -- Indication: For Supplement I will START or STAY ON the medications listed below when I get home from the hospital:    aspirin 81 mg oral delayed release tablet  -- 1 tab(s) by mouth once a day  -- Indication: For CAD (coronary artery disease)    indomethacin 25 mg oral capsule  -- 1 cap(s) by mouth every 8 hours  -- Indication: For Gouty arthritis    Percocet 5/325 oral tablet  -- 2 tab(s) by mouth every 6 hours, As Needed MDD:8   -- Caution federal law prohibits the transfer of this drug to any person other  than the person for whom it was prescribed.  May cause drowsiness.  Alcohol may intensify this effect.  Use care when operating dangerous machinery.  This prescription cannot be refilled.  This product contains acetaminophen.  Do not use  with any other product containing acetaminophen to prevent possible liver damage.  Using more of this medication than prescribed may cause serious breathing problems.    -- Indication: For Acute pain of left knee    oxycodone-acetaminophen 5 mg-325 mg oral tablet  -- 2 tab(s) by mouth every 6 hours, As needed, Severe Pain (7 - 10)  -- Indication: For Gouty arthritis    gabapentin 100 mg oral capsule  -- 2 cap(s) by mouth 2 times a day   -- Indication: For Left knee pain    ergocalciferol 50,000 intl units (1.25 mg) oral capsule  -- 1 cap(s) by mouth once a week  -- Indication: For Supplement

## 2019-01-11 NOTE — DISCHARGE NOTE ADULT - PATIENT PORTAL LINK FT
You can access the [facility] Patient Portal, offered by [facility], by registering with the following website: [email or web address]

## 2019-01-11 NOTE — PROGRESS NOTE ADULT - MUSCULOSKELETAL COMMENTS
left knee + swelling (minimal) + mild tenderness with Passive movement, no erythema, no warmth left knee + swelling (minimal) + mild tenderness with Passive movement, no erythema, no warmth, right swelling swelling and redness is much better

## 2019-01-11 NOTE — CHART NOTE - NSCHARTNOTEFT_GEN_A_CORE
Rapid Response PGY 3 Note    [id number]  GANESH GANDHI    Rapid Repsonse was called on a 68y year old Male patient for new onset chest pain less than an hour, with palpitation.  Patient has a past medical history of HTN , HLD , Gout , CAD      presenting with left knee pain and swelling x 2 days. Patient states pain is generalized in left knee radiating down the lower leg and left ankle. . Patient states he had similar pain about 3 weeks ago and was admitted to hospital for same reason. On last admission, he had left knee arthrocentesis done by Orthopedics. About 40cc of fluid was aspirated. Patient diagnosed with gout and was discharged on prednisone and colchicine but his pain came back 2 days back . Denies any CP, SOB, paresthesias. Off note, Pt completed steroid course yesterday  In ED Pt was afebrile with white count with left shift ,likely from steroids use (09 Jan 2019 02:26)      Patient was seen and examined at the bedside by the rapid response team.    Allergies    No Known Allergies    Intolerances        PAST MEDICAL & SURGICAL HISTORY:  CAD (coronary artery disease)  HLD (hyperlipidemia)  Hypertension  Gout  No significant past surgical history      Vital Signs Last 24 Hrs  T(C): 36.9 (11 Jan 2019 20:35), Max: 36.9 (11 Jan 2019 20:35)  T(F): 98.4 (11 Jan 2019 20:35), Max: 98.4 (11 Jan 2019 20:35)  HR: 112 (11 Jan 2019 20:35) (80 - 112)  BP: 150/70 (11 Jan 2019 20:35) (127/97 - 150/70)  BP(mean): --  RR: 19 (11 Jan 2019 20:35) (18 - 19)  SpO2: 99% (11 Jan 2019 20:35) (96% - 99%)          PHYSICAL EXAM:    GENERAL: NAD, well-groomed, well-developed  HEAD:  Atraumatic, Normocephalic  EYES: EOMI, PERRLA, conjunctiva and sclera clear  ENMT: No tonsillar erythema, exudates, or enlargement; Moist mucous membranes, Good dentition, No lesions  NECK: Supple, No JVD, Normal thyroid  NERVOUS SYSTEM:  Alert & Oriented X3, Good concentration; Motor Strength 5/5 B/L upper and lower extremities; DTRs 2+ intact and symmetric  CHEST/LUNG: Clear to percussion bilaterally; No rales, rhonchi, wheezing, or rubs  HEART: Regular rate and rhythm; No murmurs, rubs, or gallops  ABDOMEN: Soft, Nontender, Nondistended; Bowel sounds present  EXTREMITIES:  2+ Peripheral Pulses, No clubbing, cyanosis, or edema  LYMPH: No lymphadenopathy noted  RECTAL: No masses, prostate normal size and smooth, Guiac negative   BREAST: No palpatble masses, skin no lesions no retractions, no discharages. adenexa no palpable masses noted   GYN: uterus normal size, adenexa no palpable masses, no CMT, no uterine discharge   SKIN: No rashes or lesions      01-10 @ 07:01  -  01-11 @ 07:00  --------------------------------------------------------  IN: 0 mL / OUT: 775 mL / NET: -775 mL                              11.6   11.6  )-----------( 352      ( 11 Jan 2019 07:31 )             36.2     01-11    140  |  106  |  20<H>  ----------------------------<  86  4.3   |  26  |  0.90    Ca    8.5      11 Jan 2019 07:31                    Vital Signs Last 24 Hrs*       Assessment- Rapid Response called for 68y year old Male with a past medical history of     Plan- Rapid Response PGY 3 Note    [id number]  GANESH GANDHI    Rapid Repsonse was called on a 68y year old Male patient for new onset chest pain less than an hour, with palpitation.  Patient has a past medical history of HTN , HLD , Gout , and CAD.      HPI: Pt presented with left knee pain and swelling x 2 days. Patient states pain is generalized in left knee radiating down the lower leg and left ankle.  Patient states he had similar pain about 3 weeks ago and was admitted to hospital for same reason. On last admission, he had left knee arthrocentesis done by Orthopedics. About 40cc of fluid was aspirated that time. Patient diagnosed with gout and was discharged on prednisone and colchicine but his pain came back 2 days back . Denied any CP, SOB, paresthesias. Off note, Pt completed steroid course yesterday.  In ED Pt was afebrile with white count with left shift ,likely from steroids use.      RRT was called for acute onset substernal chest pain that is not radiating to the back. Pt says that pain is worse with cough, not much related to breathing. Denies travel history or calf pain on any side. Patient was seen and examined at the bedside by the rapid response team. Daughter at bedside helps translation.        Allergies    No Known Allergies    Intolerances        PAST MEDICAL & SURGICAL HISTORY:  CAD (coronary artery disease)  HLD (hyperlipidemia)  Hypertension  Gout  No significant past surgical history      Vital Signs Last 24 Hrs  T(C): 36.9 (11 Jan 2019 20:35), Max: 36.9 (11 Jan 2019 20:35)  T(F): 98.4 (11 Jan 2019 20:35), Max: 98.4 (11 Jan 2019 20:35)  HR: 112 (11 Jan 2019 20:35) (80 - 112)  BP: 150/70 (11 Jan 2019 20:35) (127/97 - 150/70)  BP(mean): --  RR: 19 (11 Jan 2019 20:35) (18 - 19)  SpO2: 99% (11 Jan 2019 20:35) (96% - 99%)          PHYSICAL EXAM:    GENERAL: NAD, well-groomed, well-developed  HEAD:  Atraumatic, Normocephalic  EYES: EOMI, PERRLA, conjunctiva and sclera clear  ENMT: No tonsillar erythema, exudates, or enlargement; Moist mucous membranes  NECK: Supple, No JVD, Normal thyroid  NERVOUS SYSTEM:  Alert & Oriented X3  CHEST/LUNG: Clear to percussion bilaterally; No rales, rhonchi, wheezing, or rubs  HEART: Regular rate and rhythm; No murmurs, rubs, or gallops. Tachycardia  ABDOMEN: Soft, Nontender, Nondistended; Bowel sounds present  EXTREMITIES:  2+ Peripheral Pulses, No clubbing, cyanosis, or edema, no calf tenderness.  SKIN: No rashes or lesions      01-10 @ 07:01  -  01-11 @ 07:00  --------------------------------------------------------  IN: 0 mL / OUT: 775 mL / NET: -775 mL                              11.6   11.6  )-----------( 352      ( 11 Jan 2019 07:31 )             36.2     01-11    140  |  106  |  20<H>  ----------------------------<  86  4.3   |  26  |  0.90    Ca    8.5      11 Jan 2019 07:31                    Vital Signs Last 24 Hrs during RRT: 146/-02-98.6- 100% on RA, blood glucose 104.      Assessment- Rapid Response called for 68y year old Male with a past medical history of HTN , HLD , Gout , and CAD. RRT was called for acute onset substernal chest pain that is not radiating to the back. Pt says that pain is worse with cough, not much related to breathing. Denies travel history or calf pain on any side. Patient was seen and examined at the bedside by the rapid response team. Daughter at bedside helps translation.  EKG: sinus tachycardia without ischemic change. VS stable except tachycardia as above.    Plan- Patient with immobilization since admission, and with significant h/o CAD. Will perform CT chest angio to r/o PE, and check cardiac enzymes and BMP. f/u HR. Currently pt is vitally stable. ICU attending Dr. Cook was present during the code and agreed with plans.

## 2019-01-11 NOTE — PROGRESS NOTE ADULT - SUBJECTIVE AND OBJECTIVE BOX
Patient is a 68y old  Male who presents with a chief complaint of Left knee and ankle pain (11 Jan 2019 12:52)    Interval Events:    No events overnight. f/u for Left knee pain, difficulty ambulating.     REVIEW OF SYSTEMS:  [ ] Positive  [X ] All other systems negative  [ ] Unable to assess ROS because ________    Vital Signs Last 24 Hrs  T(C): 36.8 (01-11-19 @ 12:20), Max: 36.8 (01-11-19 @ 12:20)  T(F): 98.3 (01-11-19 @ 12:20), Max: 98.3 (01-11-19 @ 12:20)  HR: 95 (01-11-19 @ 12:20) (80 - 107)  BP: 127/97 (01-11-19 @ 12:20) (116/71 - 130/74)  RR: 18 (01-11-19 @ 12:20) (18 - 18)  SpO2: 99% (01-11-19 @ 12:20) (96% - 99%)    PHYSICAL EXAM:  HEENT:   [ ]Tracheostomy:  [X ]Pupils equal  [ ]No oral lesions  [ ]Abnormal    SKIN  [X ]No Rash  [ ] Abnormal  [ ] pressure    CARDIAC  [X ]Regular  [ ]Abnormal    PULMONARY  [X ]Bilateral Clear Breath Sounds  [ ]Normal Excursion  [ ]Abnormal    GI  [ ]PEG      [ X] +BS		              [X ]Soft, nondistended, nontender	  [ ]Abnormal    MUSCULOSKELETAL                                   [ ]Bedbound                 [ ]Abnormal    [ ]Ambulatory/OOB to chair                           EXTREMITIES                                         [X ]Normal  [ ]Edema                           NEUROLOGIC  [ X] Normal, non focal  [ ] Focal findings:    PSYCHIATRIC  [X ]Alert and appropriate  [ ] Sedated	 [ ]Agitated    :  Hernandez: [ ] Yes, if yes: Date of Placement:                   [X  ] No    LINES: Central Lines [ ] Yes, if yes: Date of Placement                                     [ X ] No    HOSPITAL MEDICATIONS:  MEDICATIONS  (STANDING):  aspirin enteric coated 81 milliGRAM(s) Oral daily  atorvastatin 40 milliGRAM(s) Oral at bedtime  clopidogrel Tablet 75 milliGRAM(s) Oral daily  docusate sodium 100 milliGRAM(s) Oral two times a day  enalapril 2.5 milliGRAM(s) Oral daily  heparin  Injectable 5000 Unit(s) SubCutaneous every 8 hours  ketorolac   Injectable 30 milliGRAM(s) IV Push every 8 hours  polyethylene glycol 3350 17 Gram(s) Oral two times a day  predniSONE   Tablet 40 milliGRAM(s) Oral daily  senna 2 Tablet(s) Oral at bedtime    MEDICATIONS  (PRN):  lactulose Syrup 15 Gram(s) Oral two times a day PRN Constipation  oxyCODONE    5 mG/acetaminophen 325 mG 1 Tablet(s) Oral every 6 hours PRN Moderate Pain (4 - 6)    LABS:                        11.6   11.6  )-----------( 352      ( 11 Jan 2019 07:31 )             36.2     01-11    140  |  106  |  20<H>  ----------------------------<  86  4.3   |  26  |  0.90    Ca    8.5      11 Jan 2019 07:31

## 2019-01-11 NOTE — DISCHARGE NOTE ADULT - CARE PLAN
Principal Discharge DX:	Acute pain of left knee  Goal:	Improving  Assessment and plan of treatment:	a/w knee pain and swellign x2 day   s/p arthrocentesis December 2018  Afebrile, started on Prednisone  Secondary Diagnosis:	Gouty arthritis  Assessment and plan of treatment:	d/c on Allopurinol   Physical Therapy recommends Home PT  Secondary Diagnosis:	Hypertension  Assessment and plan of treatment:	Continue current medications, BP controlled  Secondary Diagnosis:	HLD (hyperlipidemia)  Assessment and plan of treatment:	Continue Lipitor Principal Discharge DX:	Acute pain of left knee  Goal:	Improving  Assessment and plan of treatment:	a/w knee pain and swellign x2 day   s/p arthrocentesis December 2018  Afebrile, started on Prednisone. Discharged on Prednisone taper  Secondary Diagnosis:	Gouty arthritis  Assessment and plan of treatment:	d/c on Allopurinol, Colchicine and Naprosyn   Physical Therapy recommends Home PT  Secondary Diagnosis:	Hypertension  Assessment and plan of treatment:	Continue current medications, BP controlled  Secondary Diagnosis:	HLD (hyperlipidemia)  Assessment and plan of treatment:	Continue Lipitor Principal Discharge DX:	Acute pain of left knee  Goal:	Improving  Assessment and plan of treatment:	a/w knee pain and swelling x2 day   s/p arthrocentesis December 2018  Afebrile, started on Prednisone. Discharged on Prednisone taper  Secondary Diagnosis:	Gouty arthritis  Assessment and plan of treatment:	d/c on Allopurinol, Colchicine and Naprosyn   Physical Therapy recommends Home PT  Secondary Diagnosis:	Hypertension  Assessment and plan of treatment:	Continue current medications, BP controlled  Secondary Diagnosis:	HLD (hyperlipidemia)  Assessment and plan of treatment:	Continue Lipitor Principal Discharge DX:	Acute pain of left knee  Goal:	Improving  Assessment and plan of treatment:	a/w knee pain and swelling x2 day   s/p arthrocentesis December 2018  Afebrile, started on Prednisone. Discharged on Prednisone taper    Please continue all your medications as prescribed. Follow up with orthopedic doctor in [facility]  Secondary Diagnosis:	Gouty arthritis  Assessment and plan of treatment:	continue  Allopurinol, Colchicine and Naprosyn   Physical Therapy recommends Home PT  Secondary Diagnosis:	Hypertension  Assessment and plan of treatment:	Continue current medications, BP controlled  Secondary Diagnosis:	HLD (hyperlipidemia)  Assessment and plan of treatment:	Continue Lipitor Principal Discharge DX:	Acute pain of left knee  Goal:	Improving  Assessment and plan of treatment:	Please take all the medications as prescribed including Indomethacin,  gabapentin and Percocet. Follow up tomorrow  in [facility] as scheduled for further management and to initiate  maintenance gout treatment  Secondary Diagnosis:	Gouty arthritis  Assessment and plan of treatment:	Follow up tomorrow with the doctor as scheduled for further management  Avoid food such as red meat, alcohol, processed food, cheese and ather high purine food  Secondary Diagnosis:	Hypertension  Assessment and plan of treatment:	Continue enalapril 2.5 mg daily and monitor blood pressure   Continue low salt and low fat diet   Follow up with primary care doctor for further management  Secondary Diagnosis:	HLD (hyperlipidemia)  Assessment and plan of treatment:	continue atrovastatin as prescribed. Follow up with PCP  Secondary Diagnosis:	CAD (coronary artery disease)  Assessment and plan of treatment:	continue plavix and aspirin 81 mg  Assessment and plan of treatment:	Appointment at [facility] clinic on Wednesday, 1/16/19 at 2PM  Please make sure you see the doctor tomorrow for further management

## 2019-01-12 LAB
ANION GAP SERPL CALC-SCNC: 9 MMOL/L — SIGNIFICANT CHANGE UP (ref 5–17)
BUN SERPL-MCNC: 23 MG/DL — HIGH (ref 7–18)
CALCIUM SERPL-MCNC: 8.2 MG/DL — LOW (ref 8.4–10.5)
CHLORIDE SERPL-SCNC: 102 MMOL/L — SIGNIFICANT CHANGE UP (ref 96–108)
CO2 SERPL-SCNC: 25 MMOL/L — SIGNIFICANT CHANGE UP (ref 22–31)
CREAT SERPL-MCNC: 0.98 MG/DL — SIGNIFICANT CHANGE UP (ref 0.5–1.3)
GLUCOSE SERPL-MCNC: 89 MG/DL — SIGNIFICANT CHANGE UP (ref 70–99)
HCT VFR BLD CALC: 33.6 % — LOW (ref 39–50)
HGB BLD-MCNC: 10.7 G/DL — LOW (ref 13–17)
MAGNESIUM SERPL-MCNC: 2 MG/DL — SIGNIFICANT CHANGE UP (ref 1.6–2.6)
MCHC RBC-ENTMCNC: 25.7 PG — LOW (ref 27–34)
MCHC RBC-ENTMCNC: 31.8 GM/DL — LOW (ref 32–36)
MCV RBC AUTO: 81.1 FL — SIGNIFICANT CHANGE UP (ref 80–100)
PHOSPHATE SERPL-MCNC: 4.1 MG/DL — SIGNIFICANT CHANGE UP (ref 2.5–4.5)
PLATELET # BLD AUTO: 312 K/UL — SIGNIFICANT CHANGE UP (ref 150–400)
POTASSIUM SERPL-MCNC: 4.3 MMOL/L — SIGNIFICANT CHANGE UP (ref 3.5–5.3)
POTASSIUM SERPL-SCNC: 4.3 MMOL/L — SIGNIFICANT CHANGE UP (ref 3.5–5.3)
RAPID RVP RESULT: SIGNIFICANT CHANGE UP
RBC # BLD: 4.15 M/UL — LOW (ref 4.2–5.8)
RBC # FLD: 14 % — SIGNIFICANT CHANGE UP (ref 10.3–14.5)
SODIUM SERPL-SCNC: 136 MMOL/L — SIGNIFICANT CHANGE UP (ref 135–145)
TSH SERPL-MCNC: 2.23 UU/ML — SIGNIFICANT CHANGE UP (ref 0.34–4.82)
WBC # BLD: 7.6 K/UL — SIGNIFICANT CHANGE UP (ref 3.8–10.5)
WBC # FLD AUTO: 7.6 K/UL — SIGNIFICANT CHANGE UP (ref 3.8–10.5)

## 2019-01-12 PROCEDURE — 99233 SBSQ HOSP IP/OBS HIGH 50: CPT

## 2019-01-12 RX ORDER — ENOXAPARIN SODIUM 100 MG/ML
90 INJECTION SUBCUTANEOUS EVERY 12 HOURS
Qty: 0 | Refills: 0 | Status: DISCONTINUED | OUTPATIENT
Start: 2019-01-12 | End: 2019-01-12

## 2019-01-12 RX ORDER — INDOMETHACIN 50 MG
25 CAPSULE ORAL EVERY 8 HOURS
Qty: 0 | Refills: 0 | Status: COMPLETED | OUTPATIENT
Start: 2019-01-12 | End: 2019-01-15

## 2019-01-12 RX ORDER — ENOXAPARIN SODIUM 100 MG/ML
40 INJECTION SUBCUTANEOUS DAILY
Qty: 0 | Refills: 0 | Status: DISCONTINUED | OUTPATIENT
Start: 2019-01-12 | End: 2019-01-15

## 2019-01-12 RX ADMIN — POLYETHYLENE GLYCOL 3350 17 GRAM(S): 17 POWDER, FOR SOLUTION ORAL at 17:45

## 2019-01-12 RX ADMIN — Medication 30 MILLIGRAM(S): at 05:28

## 2019-01-12 RX ADMIN — ENOXAPARIN SODIUM 90 MILLIGRAM(S): 100 INJECTION SUBCUTANEOUS at 07:12

## 2019-01-12 RX ADMIN — Medication 1000 MILLIGRAM(S): at 07:11

## 2019-01-12 RX ADMIN — OXYCODONE AND ACETAMINOPHEN 1 TABLET(S): 5; 325 TABLET ORAL at 11:04

## 2019-01-12 RX ADMIN — Medication 30 MILLIGRAM(S): at 13:02

## 2019-01-12 RX ADMIN — Medication 200 MILLIGRAM(S): at 23:15

## 2019-01-12 RX ADMIN — Medication 400 MILLIGRAM(S): at 05:28

## 2019-01-12 RX ADMIN — Medication 81 MILLIGRAM(S): at 11:57

## 2019-01-12 RX ADMIN — ATORVASTATIN CALCIUM 40 MILLIGRAM(S): 80 TABLET, FILM COATED ORAL at 21:53

## 2019-01-12 RX ADMIN — Medication 400 MILLIGRAM(S): at 13:02

## 2019-01-12 RX ADMIN — Medication 25 MILLIGRAM(S): at 18:15

## 2019-01-12 RX ADMIN — Medication 40 MILLIGRAM(S): at 05:29

## 2019-01-12 RX ADMIN — Medication 1000 MILLIGRAM(S): at 13:20

## 2019-01-12 RX ADMIN — HEPARIN SODIUM 5000 UNIT(S): 5000 INJECTION INTRAVENOUS; SUBCUTANEOUS at 05:29

## 2019-01-12 RX ADMIN — Medication 2.5 MILLIGRAM(S): at 05:31

## 2019-01-12 RX ADMIN — OXYCODONE AND ACETAMINOPHEN 1 TABLET(S): 5; 325 TABLET ORAL at 23:40

## 2019-01-12 RX ADMIN — POLYETHYLENE GLYCOL 3350 17 GRAM(S): 17 POWDER, FOR SOLUTION ORAL at 05:31

## 2019-01-12 RX ADMIN — OXYCODONE AND ACETAMINOPHEN 1 TABLET(S): 5; 325 TABLET ORAL at 21:53

## 2019-01-12 RX ADMIN — Medication 25 MILLIGRAM(S): at 17:45

## 2019-01-12 RX ADMIN — Medication 30 MILLIGRAM(S): at 13:20

## 2019-01-12 RX ADMIN — Medication 100 MILLIGRAM(S): at 05:29

## 2019-01-12 RX ADMIN — OXYCODONE AND ACETAMINOPHEN 1 TABLET(S): 5; 325 TABLET ORAL at 11:35

## 2019-01-12 RX ADMIN — Medication 200 MILLIGRAM(S): at 05:32

## 2019-01-12 RX ADMIN — Medication 30 MILLIGRAM(S): at 07:11

## 2019-01-12 RX ADMIN — ENOXAPARIN SODIUM 40 MILLIGRAM(S): 100 INJECTION SUBCUTANEOUS at 11:57

## 2019-01-12 RX ADMIN — Medication 100 MILLIGRAM(S): at 17:45

## 2019-01-12 RX ADMIN — CLOPIDOGREL BISULFATE 75 MILLIGRAM(S): 75 TABLET, FILM COATED ORAL at 11:57

## 2019-01-12 NOTE — CHART NOTE - NSCHARTNOTEFT_GEN_A_CORE
Preliminary CT angio result shows: < from: CT Angio Chest w/ IV Cont (01.11.19 @ 22:06) >    The heterogeneous or decreased density of some small segmental and subsegmental pulmonary arterial branches may reflect inadequate enhancement, beam hardening, or motion, flow, or partial volume averaging artifact. One or more small segmental or subsegmental pulmonary emboli cannot be excluded.    Will start full dose Lovenox for possible PE, and will follow-up final official report.

## 2019-01-12 NOTE — PROGRESS NOTE ADULT - SUBJECTIVE AND OBJECTIVE BOX
NP Note discussed with  Primary Attending    Patient is a 68y old  Male who presents with a chief complaint of Left knee and ankle pain (11 Jan 2019 14:26). Pt admitted with ? acute gouty arthritis attack left knee and right ankle.  Pt started on toradol yesterday and is on prednisone.  Pt states that pain is better today.  Pt is sitting up at edge of bed. Pt can move joints and extremities better.  No nausea or vomiting.  Pt complained of acute chest pain overnight and had a ct angio - neg for pe could not evaluate subsegmental arteries.        INTERVAL HPI/OVERNIGHT EVENTS: no new complaints    MEDICATIONS  (STANDING):  aspirin enteric coated 81 milliGRAM(s) Oral daily  atorvastatin 40 milliGRAM(s) Oral at bedtime  clopidogrel Tablet 75 milliGRAM(s) Oral daily  docusate sodium 100 milliGRAM(s) Oral two times a day  enalapril 2.5 milliGRAM(s) Oral daily  enoxaparin Injectable 40 milliGRAM(s) SubCutaneous daily  ketorolac   Injectable 30 milliGRAM(s) IV Push every 8 hours  polyethylene glycol 3350 17 Gram(s) Oral two times a day  predniSONE   Tablet 40 milliGRAM(s) Oral daily  senna 2 Tablet(s) Oral at bedtime    MEDICATIONS  (PRN):  guaiFENesin    Syrup 200 milliGRAM(s) Oral every 6 hours PRN Cough  lactulose Syrup 15 Gram(s) Oral two times a day PRN Constipation  oxyCODONE    5 mG/acetaminophen 325 mG 1 Tablet(s) Oral every 6 hours PRN Moderate Pain (4 - 6)      __________________________________________________  REVIEW OF SYSTEMS:    CONSTITUTIONAL: No fever,   RESPIRATORY: No cough; No shortness of breath  CARDIOVASCULAR: No chest pain, no palpitations  GASTROINTESTINAL: No pain. No nausea or vomiting; No diarrhea   NEUROLOGICAL: No headache or numbness, no tremors  MUSCULOSKELETAL + left knee pain  GENITOURINARY: no dysuria, no frequency, no hesitancy         Vital Signs Last 24 Hrs  T(C): 36.7 (12 Jan 2019 04:40), Max: 36.9 (11 Jan 2019 20:35)  T(F): 98 (12 Jan 2019 04:40), Max: 98.4 (11 Jan 2019 20:35)  HR: 97 (12 Jan 2019 04:40) (97 - 120)  BP: 145/74 (12 Jan 2019 04:40) (132/65 - 150/70)  BP(mean): --  RR: 18 (12 Jan 2019 04:40) (18 - 19)  SpO2: 98% (12 Jan 2019 04:40) (94% - 99%)    ________________________________________________  PHYSICAL EXAM:  GENERAL: NAD  CHEST/LUNG: Clear to auscultation bilaterally with good air entry   HEART: S1 S2  regular; no murmurs, gallops or rubs  ABDOMEN: Soft, Nontender, Nondistended; Bowel sounds present  EXTREMITIES: no cyanosis; + edema - bilateral ankles no calf tenderness  NERVOUS SYSTEM:  Awake and alert; Oriented  to place, person and time ; no new deficits  MUSCULOSKELETAL: + decreased rom + left knee swelling, + Left knee pain, + right ankle pain and swelling  _________________________________________________  LABS:                        10.7   7.6   )-----------( 312      ( 12 Jan 2019 07:09 )             33.6     01-12    136  |  102  |  23<H>  ----------------------------<  89  4.3   |  25  |  0.98    Ca    8.2<L>      12 Jan 2019 07:09  Phos  4.1     01-12  Mg     2.0     01-12          CAPILLARY BLOOD GLUCOSE      POCT Blood Glucose.: 104 mg/dL (11 Jan 2019 21:12)        RADIOLOGY & ADDITIONAL TESTS:  < from: CT Angio Chest w/ IV Cont (01.11.19 @ 22:06) >    EXAM:  CT ANGIO CHEST (W)AW IC                            PROCEDURE DATE:  01/11/2019          INTERPRETATION:  This exam was preliminarily interpreted by [facility]    EXAM:    CT Angiography Chest With Contrast     EXAM DATE/TIME:    1/11/2019 9:57 PM     CLINICAL HISTORY:    68 years old, male; Signs and symptoms; Shortness of breath     TECHNIQUE:    Axial computed tomographic angiography images of the chest with   intravenous   contrast using CT angiography protocol.    Coronal and sagittal reformatted images were created and reviewed.    MIP reconstructed images were created and reviewed.     CONTRAST:    55 ml of [id number] administered intravenously.     COMPARISON:    No relevant prior studies available.     FINDINGS:    Pulmonaryarteries: No central, lobar, or segmental pulmonary emboli.   Unable to evaluate subsegmental arteries due to suboptimal opacification.   Aorta: Normal. No aortic aneurysm. No aortic dissection.    Lungs: Normal. No consolidation. No masses.    Pleural space: Normal. No pneumothorax. No pleural effusion.    Heart:  Small pericardial effusion. Coronary artery calcifications may   indicate coronary artery disease    Mediastinum:  Small hiatal hernia    Kidneys and ureters:  Cyst posterior left kidney. Cyst anterior right   kidney    Lymph nodes: Unremarkable. No enlarged lymph nodes.    Bones/joints: Unremarkable. No acute fracture.    Soft tissues: Unremarkable.     IMPRESSION: No central, lobar, or segmental pulmonary emboli. Unable to   evaluate subsegmental arteries.                ALVARO CRUZ M.D., ATTENDING RADIOLOGIST  This document has been electronically signed. Jan 12 2019  8:39AM    < end of copied text >      Imaging Personally Reviewed:  YES/NO    Consultant(s) Notes Reviewed:   YES/ No    Care Discussed with Consultants :     Plan of care was discussed with patient and /or primary care giver; all questions and concerns were addressed and care was aligned with patient's wishes.

## 2019-01-12 NOTE — PROGRESS NOTE ADULT - PROBLEM SELECTOR PLAN 1
- will dc toradol today and start indomethacin   - dc home on prednisone taper  - percocet po prn  - stool softeners  - oob  - allopurinol as out pt

## 2019-01-12 NOTE — PROGRESS NOTE ADULT - SUBJECTIVE AND OBJECTIVE BOX
Patient was seen and examined  Patient is a 68y old  Male who presents with a chief complaint of Left knee and ankle pain (12 Jan 2019 13:19)      INTERVAL HPI/OVERNIGHT EVENTS:  T(C): 36.3 (01-12-19 @ 14:25), Max: 36.9 (01-11-19 @ 20:35)  HR: 103 (01-12-19 @ 14:25) (97 - 120)  BP: 153/77 (01-12-19 @ 14:25) (132/65 - 153/77)  RR: 18 (01-12-19 @ 14:25) (18 - 19)  SpO2: 98% (01-12-19 @ 14:25) (94% - 99%)  Wt(kg): --  I&O's Summary      LABS:                        10.7   7.6   )-----------( 312      ( 12 Jan 2019 07:09 )             33.6     01-12    136  |  102  |  23<H>  ----------------------------<  89  4.3   |  25  |  0.98    Ca    8.2<L>      12 Jan 2019 07:09  Phos  4.1     01-12  Mg     2.0     01-12          CAPILLARY BLOOD GLUCOSE      POCT Blood Glucose.: 104 mg/dL (11 Jan 2019 21:12)              MEDICATIONS  (STANDING):  aspirin enteric coated 81 milliGRAM(s) Oral daily  atorvastatin 40 milliGRAM(s) Oral at bedtime  clopidogrel Tablet 75 milliGRAM(s) Oral daily  docusate sodium 100 milliGRAM(s) Oral two times a day  enalapril 2.5 milliGRAM(s) Oral daily  enoxaparin Injectable 40 milliGRAM(s) SubCutaneous daily  ketorolac   Injectable 30 milliGRAM(s) IV Push every 8 hours  polyethylene glycol 3350 17 Gram(s) Oral two times a day  predniSONE   Tablet 40 milliGRAM(s) Oral daily  senna 2 Tablet(s) Oral at bedtime    MEDICATIONS  (PRN):  guaiFENesin    Syrup 200 milliGRAM(s) Oral every 6 hours PRN Cough  lactulose Syrup 15 Gram(s) Oral two times a day PRN Constipation  oxyCODONE    5 mG/acetaminophen 325 mG 1 Tablet(s) Oral every 6 hours PRN Moderate Pain (4 - 6)      RADIOLOGY & ADDITIONAL TESTS:    Imaging Personally Reviewed:  [ ] YES  [ ] NO    REVIEW OF SYSTEMS:  CONSTITUTIONAL: No fever, weight loss, or fatigue  EYES: No eye pain, visual disturbances, or discharge  ENMT:  No difficulty hearing, tinnitus, vertigo; No sinus or throat pain  NECK: No pain or stiffness  BREASTS: No pain, masses, or nipple discharge  RESPIRATORY: No cough, wheezing, chills or hemoptysis; No shortness of breath  CARDIOVASCULAR: No chest pain, palpitations, dizziness, or leg swelling  GASTROINTESTINAL: No abdominal or epigastric pain. No nausea, vomiting, or hematemesis; No diarrhea or constipation. No melena or hematochezia.  GENITOURINARY: No dysuria, frequency, hematuria, or incontinence  NEUROLOGICAL: No headaches, memory loss, loss of strength, numbness, or tremors  SKIN: No itching, burning, rashes, or lesions   LYMPH NODES: No enlarged glands  ENDOCRINE: No heat or cold intolerance; No hair loss  MUSCULOSKELETAL: No joint pain or swelling; No muscle, back, or extremity pain  PSYCHIATRIC: No depression, anxiety, mood swings, or difficulty sleeping  HEME/LYMPH: No easy bruising, or bleeding gums  ALLERY AND IMMUNOLOGIC: No hives or eczema      Consultant(s) Notes Reviewed:  [ x ] YES  [ ] NO    PHYSICAL EXAM:  GENERAL: NAD, well-groomed, well-developed  HEAD:  Atraumatic, Normocephalic  EYES: EOMI, PERRLA, conjunctiva and sclera clear  ENMT: No tonsillar erythema, exudates, or enlargement; Moist mucous membranes, Good dentition, No lesions  NECK: Supple, No JVD, Normal thyroid  NERVOUS SYSTEM:  Alert & Oriented X3, Good concentration; Motor Strength 5/5 B/L upper and lower extremities; DTRs 2+ intact and symmetric  CHEST/LUNG: Clear to percussion bilaterally; No rales, rhonchi, wheezing, or rubs  HEART: Regular rate and rhythm; No murmurs, rubs, or gallops  ABDOMEN: Soft, Nontender, Nondistended; Bowel sounds present  EXTREMITIES:  2+ Peripheral Pulses, No clubbing, cyanosis, or edema  LYMPH: No lymphadenopathy noted  SKIN: No rashes or lesions    Care Discussed with Consultants/Other Providers [ x] YES  [ ] NO

## 2019-01-13 LAB
ANION GAP SERPL CALC-SCNC: 9 MMOL/L — SIGNIFICANT CHANGE UP (ref 5–17)
BUN SERPL-MCNC: 27 MG/DL — HIGH (ref 7–18)
CALCIUM SERPL-MCNC: 8.8 MG/DL — SIGNIFICANT CHANGE UP (ref 8.4–10.5)
CHLORIDE SERPL-SCNC: 104 MMOL/L — SIGNIFICANT CHANGE UP (ref 96–108)
CO2 SERPL-SCNC: 26 MMOL/L — SIGNIFICANT CHANGE UP (ref 22–31)
CREAT SERPL-MCNC: 1.03 MG/DL — SIGNIFICANT CHANGE UP (ref 0.5–1.3)
GLUCOSE SERPL-MCNC: 91 MG/DL — SIGNIFICANT CHANGE UP (ref 70–99)
HCT VFR BLD CALC: 34.6 % — LOW (ref 39–50)
HGB BLD-MCNC: 11.5 G/DL — LOW (ref 13–17)
MAGNESIUM SERPL-MCNC: 2.3 MG/DL — SIGNIFICANT CHANGE UP (ref 1.6–2.6)
MCHC RBC-ENTMCNC: 26.7 PG — LOW (ref 27–34)
MCHC RBC-ENTMCNC: 33.2 GM/DL — SIGNIFICANT CHANGE UP (ref 32–36)
MCV RBC AUTO: 80.2 FL — SIGNIFICANT CHANGE UP (ref 80–100)
PHOSPHATE SERPL-MCNC: 4 MG/DL — SIGNIFICANT CHANGE UP (ref 2.5–4.5)
PLATELET # BLD AUTO: 341 K/UL — SIGNIFICANT CHANGE UP (ref 150–400)
POTASSIUM SERPL-MCNC: 4.4 MMOL/L — SIGNIFICANT CHANGE UP (ref 3.5–5.3)
POTASSIUM SERPL-SCNC: 4.4 MMOL/L — SIGNIFICANT CHANGE UP (ref 3.5–5.3)
RBC # BLD: 4.3 M/UL — SIGNIFICANT CHANGE UP (ref 4.2–5.8)
RBC # FLD: 13.7 % — SIGNIFICANT CHANGE UP (ref 10.3–14.5)
SODIUM SERPL-SCNC: 139 MMOL/L — SIGNIFICANT CHANGE UP (ref 135–145)
WBC # BLD: 6.2 K/UL — SIGNIFICANT CHANGE UP (ref 3.8–10.5)
WBC # FLD AUTO: 6.2 K/UL — SIGNIFICANT CHANGE UP (ref 3.8–10.5)

## 2019-01-13 RX ORDER — ZOLPIDEM TARTRATE 10 MG/1
5 TABLET ORAL ONCE
Qty: 0 | Refills: 0 | Status: DISCONTINUED | OUTPATIENT
Start: 2019-01-13 | End: 2019-01-13

## 2019-01-13 RX ADMIN — Medication 100 MILLIGRAM(S): at 05:30

## 2019-01-13 RX ADMIN — Medication 2.5 MILLIGRAM(S): at 05:29

## 2019-01-13 RX ADMIN — CLOPIDOGREL BISULFATE 75 MILLIGRAM(S): 75 TABLET, FILM COATED ORAL at 12:28

## 2019-01-13 RX ADMIN — Medication 25 MILLIGRAM(S): at 18:20

## 2019-01-13 RX ADMIN — Medication 25 MILLIGRAM(S): at 01:07

## 2019-01-13 RX ADMIN — ATORVASTATIN CALCIUM 40 MILLIGRAM(S): 80 TABLET, FILM COATED ORAL at 21:17

## 2019-01-13 RX ADMIN — Medication 100 MILLIGRAM(S): at 18:20

## 2019-01-13 RX ADMIN — SENNA PLUS 2 TABLET(S): 8.6 TABLET ORAL at 21:17

## 2019-01-13 RX ADMIN — Medication 81 MILLIGRAM(S): at 12:28

## 2019-01-13 RX ADMIN — Medication 200 MILLIGRAM(S): at 21:19

## 2019-01-13 RX ADMIN — OXYCODONE AND ACETAMINOPHEN 1 TABLET(S): 5; 325 TABLET ORAL at 21:17

## 2019-01-13 RX ADMIN — Medication 25 MILLIGRAM(S): at 10:30

## 2019-01-13 RX ADMIN — OXYCODONE AND ACETAMINOPHEN 1 TABLET(S): 5; 325 TABLET ORAL at 22:30

## 2019-01-13 RX ADMIN — Medication 25 MILLIGRAM(S): at 19:17

## 2019-01-13 RX ADMIN — ENOXAPARIN SODIUM 40 MILLIGRAM(S): 100 INJECTION SUBCUTANEOUS at 12:28

## 2019-01-13 RX ADMIN — ZOLPIDEM TARTRATE 5 MILLIGRAM(S): 10 TABLET ORAL at 00:53

## 2019-01-13 RX ADMIN — Medication 200 MILLIGRAM(S): at 12:37

## 2019-01-13 RX ADMIN — Medication 25 MILLIGRAM(S): at 00:53

## 2019-01-13 RX ADMIN — POLYETHYLENE GLYCOL 3350 17 GRAM(S): 17 POWDER, FOR SOLUTION ORAL at 18:23

## 2019-01-13 RX ADMIN — Medication 200 MILLIGRAM(S): at 05:29

## 2019-01-13 RX ADMIN — Medication 25 MILLIGRAM(S): at 09:59

## 2019-01-13 NOTE — PROGRESS NOTE ADULT - PROBLEM SELECTOR PLAN 4
colchicine was not ordered as pt was on it at home.
Continue Ace I. BP high most likely 2/2 pain
continue DVT prophylaxis

## 2019-01-13 NOTE — PROGRESS NOTE ADULT - PROBLEM SELECTOR PLAN 5
Continue heparin SQ
CT ANGIOGRAM NOTED FULL DOSE LOVENOX
CT ANGIOGRAM NOTED FULL DOSE LOVENOX
Continue Ace I. BP high most likely 2/2 pain
Heparin sq

## 2019-01-13 NOTE — PROGRESS NOTE ADULT - PROBLEM SELECTOR PROBLEM 3
Acute right ankle pain
HLD (hyperlipidemia)
CAD (coronary artery disease)

## 2019-01-13 NOTE — PROGRESS NOTE ADULT - SUBJECTIVE AND OBJECTIVE BOX
Patient was seen and examined  Patient is a 68y old  Male who presents with a chief complaint of Left knee and ankle pain (12 Jan 2019 15:06)      INTERVAL HPI/OVERNIGHT EVENTS:  T(C): 36.2 (01-13-19 @ 12:23), Max: 37 (01-13-19 @ 09:55)  HR: 102 (01-13-19 @ 12:23) (97 - 102)  BP: 155/84 (01-13-19 @ 12:23) (120/64 - 170/85)  RR: 18 (01-13-19 @ 12:23) (16 - 18)  SpO2: 97% (01-13-19 @ 12:23) (97% - 100%)  Wt(kg): --  I&O's Summary    12 Jan 2019 07:01  -  13 Jan 2019 07:00  --------------------------------------------------------  IN: 100 mL / OUT: 0 mL / NET: 100 mL        LABS:                        11.5   6.2   )-----------( 341      ( 13 Jan 2019 07:47 )             34.6     01-13    139  |  104  |  27<H>  ----------------------------<  91  4.4   |  26  |  1.03    Ca    8.8      13 Jan 2019 07:47  Phos  4.0     01-13  Mg     2.3     01-13          CAPILLARY BLOOD GLUCOSE                  MEDICATIONS  (STANDING):  aspirin enteric coated 81 milliGRAM(s) Oral daily  atorvastatin 40 milliGRAM(s) Oral at bedtime  clopidogrel Tablet 75 milliGRAM(s) Oral daily  docusate sodium 100 milliGRAM(s) Oral two times a day  enalapril 2.5 milliGRAM(s) Oral daily  enoxaparin Injectable 40 milliGRAM(s) SubCutaneous daily  indomethacin 25 milliGRAM(s) Oral every 8 hours  polyethylene glycol 3350 17 Gram(s) Oral two times a day  predniSONE   Tablet 40 milliGRAM(s) Oral daily  senna 2 Tablet(s) Oral at bedtime    MEDICATIONS  (PRN):  guaiFENesin    Syrup 200 milliGRAM(s) Oral every 6 hours PRN Cough  lactulose Syrup 15 Gram(s) Oral two times a day PRN Constipation  oxyCODONE    5 mG/acetaminophen 325 mG 1 Tablet(s) Oral every 6 hours PRN Moderate Pain (4 - 6)      RADIOLOGY & ADDITIONAL TESTS:    Imaging Personally Reviewed:  [ ] YES  [ ] NO    REVIEW OF SYSTEMS:  CONSTITUTIONAL: No fever, weight loss, or fatigue  EYES: No eye pain, visual disturbances, or discharge  ENMT:  No difficulty hearing, tinnitus, vertigo; No sinus or throat pain  NECK: No pain or stiffness  BREASTS: No pain, masses, or nipple discharge  RESPIRATORY: No cough, wheezing, chills or hemoptysis; No shortness of breath  CARDIOVASCULAR: No chest pain, palpitations, dizziness, or leg swelling  GASTROINTESTINAL: No abdominal or epigastric pain. No nausea, vomiting, or hematemesis; No diarrhea or constipation. No melena or hematochezia.  GENITOURINARY: No dysuria, frequency, hematuria, or incontinence  NEUROLOGICAL: No headaches, memory loss, loss of strength, numbness, or tremors  SKIN: No itching, burning, rashes, or lesions   LYMPH NODES: No enlarged glands  ENDOCRINE: No heat or cold intolerance; No hair loss  MUSCULOSKELETAL: No joint pain or swelling; No muscle, back, or extremity pain  PSYCHIATRIC: No depression, anxiety, mood swings, or difficulty sleeping  HEME/LYMPH: No easy bruising, or bleeding gums  ALLERY AND IMMUNOLOGIC: No hives or eczema      Consultant(s) Notes Reviewed:  [ x ] YES  [ ] NO    PHYSICAL EXAM:  GENERAL: NAD, well-groomed, well-developed  HEAD:  Atraumatic, Normocephalic  EYES: EOMI, PERRLA, conjunctiva and sclera clear  ENMT: No tonsillar erythema, exudates, or enlargement; Moist mucous membranes, Good dentition, No lesions  NECK: Supple, No JVD, Normal thyroid  NERVOUS SYSTEM:  Alert & Oriented X3, Good concentration; Motor Strength 5/5 B/L upper and lower extremities; DTRs 2+ intact and symmetric  CHEST/LUNG: Clear to percussion bilaterally; No rales, rhonchi, wheezing, or rubs  HEART: Regular rate and rhythm; No murmurs, rubs, or gallops  ABDOMEN: Soft, Nontender, Nondistended; Bowel sounds present  EXTREMITIES:  2+ Peripheral Pulses, No clubbing, cyanosis, or edema  LYMPH: No lymphadenopathy noted  SKIN: No rashes or lesions    Care Discussed with Consultants/Other Providers [ x] YES  [ ] NO

## 2019-01-13 NOTE — PROGRESS NOTE ADULT - PROBLEM SELECTOR PLAN 2
Continue enalapril. BPs 130s HR 80s
Oxycodone for pain prn  Pain management consulted, Toradol IV started
Oxycodone for pain prn .

## 2019-01-13 NOTE — PROGRESS NOTE ADULT - ASSESSMENT
NP Note discussed with  Primary Attending    Patient is a 68y old  Male who presents with a chief complaint of Left knee and ankle pain (09 Jan 2019 02:26)      INTERVAL HPI/OVERNIGHT EVENTS: no new complaints    MEDICATIONS  (STANDING):  aspirin enteric coated 81 milliGRAM(s) Oral daily  atorvastatin 40 milliGRAM(s) Oral at bedtime  cefTRIAXone   IVPB      clopidogrel Tablet 75 milliGRAM(s) Oral daily  enalapril 2.5 milliGRAM(s) Oral daily  heparin  Injectable 5000 Unit(s) SubCutaneous every 8 hours  predniSONE   Tablet 40 milliGRAM(s) Oral daily    MEDICATIONS  (PRN):  acetaminophen   Tablet .. 650 milliGRAM(s) Oral every 6 hours PRN Mild Pain (1 - 3)      __________________________________________________  REVIEW OF SYSTEMS:    CONSTITUTIONAL: No fever,   EYES: no acute visual disturbances  NECK: No pain or stiffness  RESPIRATORY: No cough; No shortness of breath  CARDIOVASCULAR: No chest pain, no palpitations  GASTROINTESTINAL: No pain. No nausea or vomiting; No diarrhea   NEUROLOGICAL: no ha or tremors  MUSCULOSKELETAL: severe knee pain  GENITOURINARY: no dysuria, no frequency, no hesitancy  PSYCHIATRY: no depression , no anxiety  ALL OTHER  ROS negative        Vital Signs Last 24 Hrs  T(C): 36.7 (09 Jan 2019 05:44), Max: 36.7 (08 Jan 2019 15:56)  T(F): 98.1 (09 Jan 2019 05:44), Max: 98.1 (09 Jan 2019 05:44)  HR: 85 (09 Jan 2019 05:44) (85 - 109)  BP: 138/85 (09 Jan 2019 05:44) (118/71 - 160/78)  BP(mean): --  RR: 18 (09 Jan 2019 05:44) (18 - 18)  SpO2: 100% (09 Jan 2019 05:44) (97% - 100%)    ________________________________________________  PHYSICAL EXAM:  GENERAL: NAD  HEENT: Normocephalic;  conjunctivae and sclerae clear; moist mucous membranes;   NECK : supple  CHEST/LUNG: Clear to auscultationHEART: S1 S2  regular; no murmurs, gallops or rubs  ABDOMEN: Soft, Nontender, Nondistended; Bowel sounds present  EXTREMITIES: no cyanosis; no edema; no calf tenderness  SKIN: warm and dry; no rash  NERVOUS SYSTEM:  Awake and alert; Oriented  to place, person and time [place] speaking  _________________________________________________  LABS:                        12.2   16.9  )-----------( 307      ( 08 Jan 2019 16:55 )             38.3     01-08    141  |  107  |  22<H>  ----------------------------<  160<H>  4.0   |  24  |  0.97    Ca    9.0      08 Jan 2019 16:55    TPro  7.5  /  Alb  3.0<L>  /  TBili  1.6<H>  /  DBili  x   /  AST  13  /  ALT  23  /  AlkPhos  86  01-08        CAPILLARY BLOOD GLUCOSE            RADIOLOGY & ADDITIONAL TESTS:    Imaging Personally Reviewed:  YES  Consultant(s) Notes Reviewed:   No    Care Discussed with Consultants : Dr Doss    Plan of care was discussed with patient all questions and concerns were addressed and care was aligned with patient's wishes.
NP Note discussed with  Primary Attending          INTERVAL HPI/OVERNIGHT EVENTS: complains of left knee pain    MEDICATIONS  (STANDING):  aspirin enteric coated 81 milliGRAM(s) Oral daily  atorvastatin 40 milliGRAM(s) Oral at bedtime  clopidogrel Tablet 75 milliGRAM(s) Oral daily  enalapril 2.5 milliGRAM(s) Oral daily  heparin  Injectable 5000 Unit(s) SubCutaneous every 8 hours  predniSONE   Tablet 40 milliGRAM(s) Oral daily    MEDICATIONS  (PRN):  acetaminophen   Tablet .. 650 milliGRAM(s) Oral every 6 hours PRN Mild Pain (1 - 3)  oxyCODONE    5 mG/acetaminophen 325 mG 1 Tablet(s) Oral every 6 hours PRN Moderate Pain (4 - 6)      __________________________________________________  REVIEW OF SYSTEMS:    CONSTITUTIONAL: No fever,   EYES: no acute visual disturbances  NECK: No pain or stiffness  RESPIRATORY: No cough; No shortness of breath  CARDIOVASCULAR: No chest pain, no palpitations  GASTROINTESTINAL: No pain. No nausea or vomiting; No diarrhea   NEUROLOGICAL: No headache or numbness, no tremors  MUSCULOSKELETAL: right knee and ankle pain  GENITOURINARY: no dysuria, no frequency, no hesitancy  PSYCHIATRY: no depression , no anxiety        Vital Signs Last 24 Hrs  T(C): 36.7 (10 Jitendra 2019 12:06), Max: 36.7 (10 Jitendra 2019 12:06)  T(F): 98.1 (10 Jitendra 2019 12:06), Max: 98.1 (10 Jitendra 2019 12:06)  HR: 95 (10 Jitendra 2019 12:06) (89 - 95)  BP: 150/76 (10 Jitendra 2019 12:06) (124/91 - 150/76)  BP(mean): --  RR: 18 (10 Jitendra 2019 12:06) (16 - 18)  SpO2: 99% (10 Jitendra 2019 12:06) (95% - 100%)    ________________________________________________  PHYSICAL EXAM:  GENERAL: NAD  HEENT: Normocephalic;  conjunctivae and sclerae clear; moist mucous membranes;   NECK : supple  CHEST/LUNG: Clear to auscultation decreased BSHEART: S1 S2  regular; no murmurs, gallops or rubs  ABDOMEN: Soft, Nontender, Nondistended; Bowel sounds present  EXTREMITIES: no cyanosis; b/l le and ankle edema Left > right; no calf tenderness  SKIN: warm and dry; no rash  NERVOUS SYSTEM:  Awake and alert; Oriented  to place, person and time   _________________________________________________  LABS:                        11.8   11.2  )-----------( 372      ( 10 Jitendra 2019 12:34 )             36.9     -10    139  |  103  |  22<H>  ----------------------------<  198<H>  4.2   |  24  |  1.01    Ca    8.6      10 Jitendra 2019 10:49  Phos  3.9         TPro  6.8  /  Alb  2.5<L>  /  TBili  0.9  /  DBili  x   /  AST  10  /  ALT  23  /  AlkPhos  78        Urinalysis Basic - ( 2019 11:00 )    Color: Yellow / Appearance: Clear / S.020 / pH: x  Gluc: x / Ketone: Negative  / Bili: Negative / Urobili: Negative   Blood: x / Protein: Negative / Nitrite: Negative   Leuk Esterase: Trace / RBC: 0-2 /HPF / WBC 3-5 /HPF   Sq Epi: x / Non Sq Epi: Few /HPF / Bacteria: Trace /HPF      CAPILLARY BLOOD GLUCOSE            RADIOLOGY & ADDITIONAL TESTS:    Imaging Personally Reviewed:  YES    Consultant(s) Notes Reviewed:   YES    Care Discussed with Consultants : YES    Plan of care was discussed with patient  all questions and concerns were addressed and care was aligned with patient's wishes.
Patient is a 68y Male with  PMH of HTN , HLD , Gout , CAD  presenting with left knee pain and swelling x 2 days. Patient states pain is generalized in left knee radiating down the lower leg and left ankle. . Patient states he had similar pain about 3 weeks ago and was admitted to hospital for same reason. On last admission, he had left knee arthrocentesis done by Orthopedics. About 40cc of fluid was aspirated. Patient diagnosed with gout and was discharged on prednisone and colchicine but his pain came back 2 days back . Denies any CP, SOB, paresthesias. Off note, Pt completed steroid course yesterday    1/11 Stable, c/o Left knee pain. Pain management consulted, Toradol IV started x 2 days. ID Dr. Doss following. No need for abx. On d/c patient to continue with prednisone taper and allopurinol. Follow up with primary medical doctor within a week fo discharge.
Patient is a 68y Male with  PMH of HTN , HLD , Gout , CAD  presenting with left knee pain and swelling x 2 days. Patient states pain is generalized in left knee radiating down the lower leg and left ankle. . Patient states he had similar pain about 3 weeks ago and was admitted to hospital for same reason. On last admission, he had left knee arthrocentesis done by Orthopedics. About 40cc of fluid was aspirated. Patient diagnosed with gout and was discharged on prednisone and colchicine but his pain came back 2 days back . Denies any CP, SOB, paresthesias. Off note, Pt completed steroid course yesterday    1/11 Stable, c/o Left knee pain. Pain management consulted, Toradol IV started x 2 days. ID Dr. Doss following. No need for abx. On d/c patient to continue with prednisone taper and allopurinol. Follow up with primary medical doctor within a week fo discharge.
Patient is a 68y Male with  PMH of HTN , HLD , Gout , CAD  presenting with left knee pain and swelling x 2 days. Patient states pain is generalized in left knee radiating down the lower leg and left ankle. . Patient states he had similar pain about 3 weeks ago and was admitted to hospital for same reason. On last admission, he had left knee arthrocentesis done by Orthopedics. About 40cc of fluid was aspirated. Patient diagnosed with gout and was discharged on prednisone and colchicine but his pain came back 2 days back . Denies any CP, SOB, paresthesias. Off note, Pt completed steroid course yesterday    1/11 Stable, c/o Left knee pain. Pain management consulted, Toradol IV started x 2 days. ID Dr. Doss following. No need for abx. On d/c patient to continue with prednisone taper and allopurinol. Follow up with primary medical doctor within a week fo discharge.   ALC FROM AM
Gouty Arthritis - improving    plan - give steroid taper  start allopurinol in few days post discharge  dc planning   reconsult prn

## 2019-01-14 LAB
CULTURE RESULTS: SIGNIFICANT CHANGE UP
SPECIMEN SOURCE: SIGNIFICANT CHANGE UP

## 2019-01-14 PROCEDURE — 99232 SBSQ HOSP IP/OBS MODERATE 35: CPT

## 2019-01-14 RX ORDER — GABAPENTIN 400 MG/1
2 CAPSULE ORAL
Qty: 40 | Refills: 0
Start: 2019-01-14 | End: 2019-01-23

## 2019-01-14 RX ORDER — GABAPENTIN 400 MG/1
100 CAPSULE ORAL
Qty: 0 | Refills: 0 | Status: DISCONTINUED | OUTPATIENT
Start: 2019-01-14 | End: 2019-01-15

## 2019-01-14 RX ORDER — OXYCODONE AND ACETAMINOPHEN 5; 325 MG/1; MG/1
2 TABLET ORAL EVERY 6 HOURS
Qty: 0 | Refills: 0 | Status: DISCONTINUED | OUTPATIENT
Start: 2019-01-14 | End: 2019-01-15

## 2019-01-14 RX ORDER — INDOMETHACIN 50 MG
1 CAPSULE ORAL
Qty: 9 | Refills: 0
Start: 2019-01-14 | End: 2019-01-16

## 2019-01-14 RX ORDER — OXYCODONE AND ACETAMINOPHEN 5; 325 MG/1; MG/1
1 TABLET ORAL ONCE
Qty: 0 | Refills: 0 | Status: DISCONTINUED | OUTPATIENT
Start: 2019-01-14 | End: 2019-01-14

## 2019-01-14 RX ADMIN — ENOXAPARIN SODIUM 40 MILLIGRAM(S): 100 INJECTION SUBCUTANEOUS at 11:44

## 2019-01-14 RX ADMIN — GABAPENTIN 100 MILLIGRAM(S): 400 CAPSULE ORAL at 13:49

## 2019-01-14 RX ADMIN — SENNA PLUS 2 TABLET(S): 8.6 TABLET ORAL at 21:42

## 2019-01-14 RX ADMIN — Medication 25 MILLIGRAM(S): at 01:09

## 2019-01-14 RX ADMIN — Medication 25 MILLIGRAM(S): at 12:16

## 2019-01-14 RX ADMIN — Medication 200 MILLIGRAM(S): at 05:40

## 2019-01-14 RX ADMIN — OXYCODONE AND ACETAMINOPHEN 1 TABLET(S): 5; 325 TABLET ORAL at 15:00

## 2019-01-14 RX ADMIN — CLOPIDOGREL BISULFATE 75 MILLIGRAM(S): 75 TABLET, FILM COATED ORAL at 11:44

## 2019-01-14 RX ADMIN — Medication 25 MILLIGRAM(S): at 02:19

## 2019-01-14 RX ADMIN — OXYCODONE AND ACETAMINOPHEN 1 TABLET(S): 5; 325 TABLET ORAL at 06:10

## 2019-01-14 RX ADMIN — OXYCODONE AND ACETAMINOPHEN 1 TABLET(S): 5; 325 TABLET ORAL at 14:29

## 2019-01-14 RX ADMIN — OXYCODONE AND ACETAMINOPHEN 1 TABLET(S): 5; 325 TABLET ORAL at 13:40

## 2019-01-14 RX ADMIN — Medication 25 MILLIGRAM(S): at 17:14

## 2019-01-14 RX ADMIN — Medication 2.5 MILLIGRAM(S): at 05:40

## 2019-01-14 RX ADMIN — Medication 100 MILLIGRAM(S): at 05:41

## 2019-01-14 RX ADMIN — OXYCODONE AND ACETAMINOPHEN 1 TABLET(S): 5; 325 TABLET ORAL at 13:09

## 2019-01-14 RX ADMIN — POLYETHYLENE GLYCOL 3350 17 GRAM(S): 17 POWDER, FOR SOLUTION ORAL at 17:14

## 2019-01-14 RX ADMIN — Medication 25 MILLIGRAM(S): at 17:51

## 2019-01-14 RX ADMIN — Medication 81 MILLIGRAM(S): at 11:44

## 2019-01-14 RX ADMIN — Medication 25 MILLIGRAM(S): at 11:43

## 2019-01-14 RX ADMIN — ATORVASTATIN CALCIUM 40 MILLIGRAM(S): 80 TABLET, FILM COATED ORAL at 21:42

## 2019-01-14 RX ADMIN — OXYCODONE AND ACETAMINOPHEN 1 TABLET(S): 5; 325 TABLET ORAL at 05:40

## 2019-01-14 RX ADMIN — Medication 100 MILLIGRAM(S): at 17:14

## 2019-01-14 NOTE — DIETITIAN INITIAL EVALUATION ADULT. - OTHER INFO
Pt visited. Pt is Malian speaking. With help Of  RN Information Obtained. LOS= 7 days. Sister at bedside.  Pt Reports Good appetite. NKFA reported Sister At bedside.

## 2019-01-14 NOTE — PROGRESS NOTE ADULT - SUBJECTIVE AND OBJECTIVE BOX
NP Note discussed with  Primary Attending    Patient is a 68y old  Male who presents with a chief complaint of Left knee and ankle pain (13 Jan 2019 18:20).  Pt with acute gouty arthritis.  Over the weekend, pt was complaining of chest pain - ct angio done which was neg. However, it showed a small pericardial effusion. Echo shows the same.  Pt with no sob or cp.  Pt was able to ambulate with walker down lopez.  Pt stopped taking steroids 2 days ago.  Pt is aware of possible discharge to home. Pt to have meds via vivo.        INTERVAL HPI/OVERNIGHT EVENTS: no new complaints    MEDICATIONS  (STANDING):  aspirin enteric coated 81 milliGRAM(s) Oral daily  atorvastatin 40 milliGRAM(s) Oral at bedtime  clopidogrel Tablet 75 milliGRAM(s) Oral daily  docusate sodium 100 milliGRAM(s) Oral two times a day  enalapril 2.5 milliGRAM(s) Oral daily  enoxaparin Injectable 40 milliGRAM(s) SubCutaneous daily  gabapentin 100 milliGRAM(s) Oral two times a day  indomethacin 25 milliGRAM(s) Oral every 8 hours  polyethylene glycol 3350 17 Gram(s) Oral two times a day  predniSONE   Tablet 40 milliGRAM(s) Oral daily  senna 2 Tablet(s) Oral at bedtime    MEDICATIONS  (PRN):  guaiFENesin    Syrup 200 milliGRAM(s) Oral every 6 hours PRN Cough  lactulose Syrup 15 Gram(s) Oral two times a day PRN Constipation  oxyCODONE    5 mG/acetaminophen 325 mG 1 Tablet(s) Oral every 6 hours PRN Moderate Pain (4 - 6)      __________________________________________________  REVIEW OF SYSTEMS:    CONSTITUTIONAL: No fever,   RESPIRATORY: + dry cough; No shortness of breath  CARDIOVASCULAR: No chest pain, no palpitations  GASTROINTESTINAL: No pain. No nausea or vomiting; No diarrhea   NEUROLOGICAL: No headache or numbness, no tremors  MUSCULOSKELETAL: + left knee pain, + right ankle pain  GENITOURINARY: no dysuria, no frequency, no hesitancy      Vital Signs Last 24 Hrs  T(C): 36.3 (14 Jan 2019 12:32), Max: 36.9 (13 Jan 2019 20:40)  T(F): 97.3 (14 Jan 2019 12:32), Max: 98.4 (13 Jan 2019 20:40)  HR: 91 (14 Jan 2019 12:32) (89 - 93)  BP: 121/84 (14 Jan 2019 12:32) (100/42 - 139/67)  BP(mean): 56 (14 Jan 2019 07:18) (56 - 56)  RR: 16 (14 Jan 2019 12:32) (16 - 20)  SpO2: 98% (14 Jan 2019 12:32) (94% - 100%)    ________________________________________________  PHYSICAL EXAM:  GENERAL: NAD  CHEST/LUNG: decreased breath sounds  HEART: S1 S2  regular; no murmurs, gallops or rubs  ABDOMEN: Soft, Nontender, Nondistended; Bowel sounds present  EXTREMITIES: no cyanosis; no edema; no calf tenderness  NERVOUS SYSTEM:  Awake and alert; Oriented  to place, person and time ; no new deficits  MUSCULOSKELETAL: + decreased rom - left leg  _________________________________________________  LABS:                        11.5   6.2   )-----------( 341      ( 13 Jan 2019 07:47 )             34.6     01-13    139  |  104  |  27<H>  ----------------------------<  91  4.4   |  26  |  1.03    Ca    8.8      13 Jan 2019 07:47  Phos  4.0     01-13  Mg     2.3     01-13          CAPILLARY BLOOD GLUCOSE            RADIOLOGY & ADDITIONAL TESTS:    Imaging Personally Reviewed:  YES/NO    Consultant(s) Notes Reviewed:   YES/ No    Care Discussed with Consultants :     Plan of care was discussed with patient and /or primary care giver; all questions and concerns were addressed and care was aligned with patient's wishes.

## 2019-01-14 NOTE — PROGRESS NOTE ADULT - PROBLEM SELECTOR PLAN 1
- pain due to gouty arthritis  - pt refusing po steroids - will dc home on taper.  Education provided  - continue indomethacin  - percocet 2 tabs po q 6 hours prn  - gabapentin 100mg po q 12  - stool softners  - dc home on allopurinol  - follow up with orthopedic at [facility]  - pt should follow up at [facility]   - social work consult - pain due to gouty arthritis  - pt refusing po steroids - will dc home on taper.  Education provided  - continue indomethacin for 2 more days  - percocet 2 tabs po q 6 hours prn  - gabapentin 100mg po q 12  - stool softners  - dc home on allopurinol  - follow up with orthopedic at [facility]  - pt should follow up at [facility]   - social work consult  - dc home tomorrow - pain due to gouty arthritis  - pt refusing po steroids - will dc home on taper.  Education provided  - continue indomethacin for 2 more days  - percocet 2 tabs po q 6 hours prn  - gabapentin 100mg po q 12  - stool softners  - dc home on allopurinol  - follow up with orthopedic at [facility]  - pt should follow up at [facility]   - social work consult  - dc home tomorrow - meds to be delivered to floor tomorrow - pain due to gouty arthritis  - pt refusing po steroids - will dc home on taper.  Education provided  - continue indomethacin for 2 more days  - percocet 2 tabs po q 6 hours prn  - gabapentin 100mg po q 12  - stool softners  - dc home on allopurinol  - follow up with orthopedic at [facility]  - pt should follow up at [facility]   - social work consult - gallo on case today  - dc home tomorrow - meds to be delivered to floor tomorrow

## 2019-01-14 NOTE — DIETITIAN INITIAL EVALUATION ADULT. - PROBLEM SELECTOR PLAN 1
p/w left knee pain , similar complaint as on last admission  pt was diagnosed with gouty arthritis on last adm, arthrocentesis was done  and was d/c on prednisone and colchicine  Xray L knee : suprapatellar bursal joint effusion  Left ankle pain and tenderness too   D/d Gout flare vs Rheumatoid arthritis vs septic arthritis  septic arthritis less likely : no fever, white count from steroid use,no gross erthyma or warmth  ESR elevated 81  Ortho consulted : pt refused arthrocentesis this time, wants medical management   f/u CRP , RF, Anti CCP, uric acid levels  will do MRI of L knee   pain management with tylenol and percocet for now   c/w col chine and steroids  started on be doyle consulted. He wants to be called once labs are back  ID Dr Doss  PT [place]

## 2019-01-15 VITALS
SYSTOLIC BLOOD PRESSURE: 110 MMHG | OXYGEN SATURATION: 98 % | TEMPERATURE: 98 F | HEART RATE: 118 BPM | DIASTOLIC BLOOD PRESSURE: 67 MMHG | RESPIRATION RATE: 18 BRPM

## 2019-01-15 PROCEDURE — 85652 RBC SED RATE AUTOMATED: CPT

## 2019-01-15 PROCEDURE — 81001 URINALYSIS AUTO W/SCOPE: CPT

## 2019-01-15 PROCEDURE — 87486 CHLMYD PNEUM DNA AMP PROBE: CPT

## 2019-01-15 PROCEDURE — 93306 TTE W/DOPPLER COMPLETE: CPT

## 2019-01-15 PROCEDURE — 93005 ELECTROCARDIOGRAM TRACING: CPT

## 2019-01-15 PROCEDURE — 96374 THER/PROPH/DIAG INJ IV PUSH: CPT

## 2019-01-15 PROCEDURE — 82553 CREATINE MB FRACTION: CPT

## 2019-01-15 PROCEDURE — 84484 ASSAY OF TROPONIN QUANT: CPT

## 2019-01-15 PROCEDURE — 87040 BLOOD CULTURE FOR BACTERIA: CPT

## 2019-01-15 PROCEDURE — 87581 M.PNEUMON DNA AMP PROBE: CPT

## 2019-01-15 PROCEDURE — 80053 COMPREHEN METABOLIC PANEL: CPT

## 2019-01-15 PROCEDURE — 99231 SBSQ HOSP IP/OBS SF/LOW 25: CPT

## 2019-01-15 PROCEDURE — 71046 X-RAY EXAM CHEST 2 VIEWS: CPT | Mod: 26

## 2019-01-15 PROCEDURE — 36415 COLL VENOUS BLD VENIPUNCTURE: CPT

## 2019-01-15 PROCEDURE — 99285 EMERGENCY DEPT VISIT HI MDM: CPT | Mod: 25

## 2019-01-15 PROCEDURE — 86431 RHEUMATOID FACTOR QUANT: CPT

## 2019-01-15 PROCEDURE — 96375 TX/PRO/DX INJ NEW DRUG ADDON: CPT

## 2019-01-15 PROCEDURE — 85027 COMPLETE CBC AUTOMATED: CPT

## 2019-01-15 PROCEDURE — 73562 X-RAY EXAM OF KNEE 3: CPT

## 2019-01-15 PROCEDURE — 84550 ASSAY OF BLOOD/URIC ACID: CPT

## 2019-01-15 PROCEDURE — 84443 ASSAY THYROID STIM HORMONE: CPT

## 2019-01-15 PROCEDURE — 87798 DETECT AGENT NOS DNA AMP: CPT

## 2019-01-15 PROCEDURE — 83690 ASSAY OF LIPASE: CPT

## 2019-01-15 PROCEDURE — 86200 CCP ANTIBODY: CPT

## 2019-01-15 PROCEDURE — 73721 MRI JNT OF LWR EXTRE W/O DYE: CPT

## 2019-01-15 PROCEDURE — 80048 BASIC METABOLIC PNL TOTAL CA: CPT

## 2019-01-15 PROCEDURE — 71046 X-RAY EXAM CHEST 2 VIEWS: CPT

## 2019-01-15 PROCEDURE — 71275 CT ANGIOGRAPHY CHEST: CPT

## 2019-01-15 PROCEDURE — 97116 GAIT TRAINING THERAPY: CPT

## 2019-01-15 PROCEDURE — 83605 ASSAY OF LACTIC ACID: CPT

## 2019-01-15 PROCEDURE — 87633 RESP VIRUS 12-25 TARGETS: CPT

## 2019-01-15 PROCEDURE — 83735 ASSAY OF MAGNESIUM: CPT

## 2019-01-15 PROCEDURE — 82550 ASSAY OF CK (CPK): CPT

## 2019-01-15 PROCEDURE — 82962 GLUCOSE BLOOD TEST: CPT

## 2019-01-15 PROCEDURE — 97530 THERAPEUTIC ACTIVITIES: CPT

## 2019-01-15 PROCEDURE — 84100 ASSAY OF PHOSPHORUS: CPT

## 2019-01-15 PROCEDURE — 97110 THERAPEUTIC EXERCISES: CPT

## 2019-01-15 RX ORDER — ATORVASTATIN CALCIUM 80 MG/1
1 TABLET, FILM COATED ORAL
Qty: 14 | Refills: 0 | OUTPATIENT
Start: 2019-01-15 | End: 2019-01-28

## 2019-01-15 RX ORDER — BENZOCAINE AND MENTHOL 5; 1 G/100ML; G/100ML
1 LIQUID ORAL
Qty: 0 | Refills: 0 | Status: DISCONTINUED | OUTPATIENT
Start: 2019-01-15 | End: 2019-01-15

## 2019-01-15 RX ORDER — CLOPIDOGREL BISULFATE 75 MG/1
1 TABLET, FILM COATED ORAL
Qty: 14 | Refills: 0
Start: 2019-01-15 | End: 2019-01-28

## 2019-01-15 RX ORDER — CLOPIDOGREL BISULFATE 75 MG/1
1 TABLET, FILM COATED ORAL
Qty: 14 | Refills: 0 | OUTPATIENT
Start: 2019-01-15 | End: 2019-01-28

## 2019-01-15 RX ORDER — ATORVASTATIN CALCIUM 80 MG/1
1 TABLET, FILM COATED ORAL
Qty: 14 | Refills: 0
Start: 2019-01-15 | End: 2019-01-28

## 2019-01-15 RX ADMIN — Medication 40 MILLIGRAM(S): at 07:14

## 2019-01-15 RX ADMIN — Medication 200 MILLIGRAM(S): at 02:09

## 2019-01-15 RX ADMIN — GABAPENTIN 100 MILLIGRAM(S): 400 CAPSULE ORAL at 06:13

## 2019-01-15 RX ADMIN — Medication 25 MILLIGRAM(S): at 02:08

## 2019-01-15 RX ADMIN — Medication 25 MILLIGRAM(S): at 03:00

## 2019-01-15 RX ADMIN — POLYETHYLENE GLYCOL 3350 17 GRAM(S): 17 POWDER, FOR SOLUTION ORAL at 06:13

## 2019-01-15 RX ADMIN — OXYCODONE AND ACETAMINOPHEN 2 TABLET(S): 5; 325 TABLET ORAL at 05:12

## 2019-01-15 RX ADMIN — Medication 100 MILLIGRAM(S): at 06:13

## 2019-01-15 RX ADMIN — Medication 2.5 MILLIGRAM(S): at 06:13

## 2019-01-15 RX ADMIN — Medication 25 MILLIGRAM(S): at 12:30

## 2019-01-15 RX ADMIN — CLOPIDOGREL BISULFATE 75 MILLIGRAM(S): 75 TABLET, FILM COATED ORAL at 12:30

## 2019-01-15 RX ADMIN — OXYCODONE AND ACETAMINOPHEN 2 TABLET(S): 5; 325 TABLET ORAL at 04:15

## 2019-01-15 RX ADMIN — Medication 81 MILLIGRAM(S): at 12:30

## 2019-01-15 RX ADMIN — ENOXAPARIN SODIUM 40 MILLIGRAM(S): 100 INJECTION SUBCUTANEOUS at 12:31

## 2019-01-15 RX ADMIN — Medication 200 MILLIGRAM(S): at 12:31

## 2019-01-15 RX ADMIN — Medication 25 MILLIGRAM(S): at 14:00

## 2019-01-15 NOTE — PROGRESS NOTE ADULT - PROBLEM SELECTOR PROBLEM 2
Gouty arthritis
Hypertension, unspecified type
Gouty arthritis
Gouty arthritis
Acute pain of left knee

## 2019-01-15 NOTE — PROGRESS NOTE ADULT - PROVIDER SPECIALTY LIST ADULT
Internal Medicine
Pain Medicine
Pain Medicine
Infectious Disease
Internal Medicine

## 2019-01-15 NOTE — PROGRESS NOTE ADULT - REASON FOR ADMISSION
Left knee and ankle pain

## 2019-01-15 NOTE — PROGRESS NOTE ADULT - PROBLEM SELECTOR PLAN 1
- gabapentin 100mg po q 12 hours  - percocet 2 tabs po q 6 hours prn  - indomethicin 25mg po q 8 for 2 more days  - oob  - chest xray today

## 2019-01-15 NOTE — PROGRESS NOTE ADULT - SUBJECTIVE AND OBJECTIVE BOX
NP Note discussed with  Primary Attending    Patient is a 68y old  Male who presents with a chief complaint of Left knee and ankle pain (14 Jan 2019 14:53).  Pt oob and in chair.  Pt ambulating with walker.  + complaints of left knee pain when walking.  Pt complaining of cough and mild chest congestion.  Pt to have xray done and for possible xray today.        INTERVAL HPI/OVERNIGHT EVENTS: no new complaints    MEDICATIONS  (STANDING):  aspirin enteric coated 81 milliGRAM(s) Oral daily  atorvastatin 40 milliGRAM(s) Oral at bedtime  clopidogrel Tablet 75 milliGRAM(s) Oral daily  docusate sodium 100 milliGRAM(s) Oral two times a day  enalapril 2.5 milliGRAM(s) Oral daily  enoxaparin Injectable 40 milliGRAM(s) SubCutaneous daily  gabapentin 100 milliGRAM(s) Oral two times a day  indomethacin 25 milliGRAM(s) Oral every 8 hours  polyethylene glycol 3350 17 Gram(s) Oral two times a day  predniSONE   Tablet 40 milliGRAM(s) Oral daily  senna 2 Tablet(s) Oral at bedtime    MEDICATIONS  (PRN):  guaiFENesin    Syrup 200 milliGRAM(s) Oral every 6 hours PRN Cough  lactulose Syrup 15 Gram(s) Oral two times a day PRN Constipation  oxyCODONE    5 mG/acetaminophen 325 mG 2 Tablet(s) Oral every 6 hours PRN Severe Pain (7 - 10)      __________________________________________________  REVIEW OF SYSTEMS:    CONSTITUTIONAL: No fever,   RESPIRATORY: + cough, - sob - no wheezing  CARDIOVASCULAR: No chest pain, no palpitations  GASTROINTESTINAL: No pain. No nausea or vomiting; No diarrhea   NEUROLOGICAL: No headache or numbness, no tremors  MUSCULOSKELETAL:+ left knee pain  GENITOURINARY: no dysuria, no frequency, no hesitancy         Vital Signs Last 24 Hrs  T(C): 36.7 (15 Jitendra 2019 09:51), Max: 36.7 (15 Jitendra 2019 09:51)  T(F): 98 (15 Jitendra 2019 09:51), Max: 98 (15 Jitendra 2019 09:51)  HR: 100 (15 Jitendra 2019 09:51) (91 - 101)  BP: 100/69 (15 Jitendra 2019 09:51) (100/69 - 137/88)  BP(mean): --  RR: 14 (15 Jitendra 2019 09:51) (14 - 19)  SpO2: 100% (15 Jitendra 2019 09:51) (96% - 100%)    ________________________________________________  PHYSICAL EXAM:  GENERAL: NAD  CHEST/LUNG: - wheezing, + cough + mild congestion  HEART: S1 S2  regular; no murmurs, gallops or rubs  ABDOMEN: Soft, Nontender, Nondistended; Bowel sounds present  EXTREMITIES: + left knee edema - better than before  SKIN: warm and dry; no rash  NERVOUS SYSTEM:  Awake and alert; Oriented  to place, person and time ; no new deficits  MUSCULOSKELETAL: + left knee tenderness, + decreased rom   _________________________________________________  LABS:              CAPILLARY BLOOD GLUCOSE            RADIOLOGY & ADDITIONAL TESTS:    Imaging Personally Reviewed:  YES/NO    Consultant(s) Notes Reviewed:   YES/ No    Care Discussed with Consultants :     Plan of care was discussed with patient and /or primary care giver; all questions and concerns were addressed and care was aligned with patient's wishes.

## 2019-07-15 NOTE — PROGRESS NOTE ADULT - PROBLEM SELECTOR PROBLEM 2
CHIEF COMPLAINT:  Chief Complaint   Patient presents with   • Altered Mental Status       Primary physician:  Sergey Eubanks MD    HISTORY OF PRESENT ILLNESS:  Lisa Irving is a 58 year old female presenting with difficulty focusing cognitively. The patient reports that today she went to work and at work she was unable to focus (cognitively). She reports that she was trying to type and do work and she was just unable to do so. She denies any double vision, blurry vision, or loss of vision. She denies any unilateral numbness, tingling, or weakness. She reports no difficulty with her speech or swallowing. She says in retrospect last couple of days she was having a little bit of difficulty for example with texting. She has never felt this way before. She says she just felt off the last couple of days but did not specifically feel that she was coming down with any illness symptoms.    She reports no chest pain, chest pressure, or chest tightness. She reports no palpitations. She reports no shortness of breath. No nausea or vomiting. No abdominal pain, constipation, diarrhea, blood in the stool, or dark black tarry stool. No dysuria, frequency, or hematuria.    Past Medical History:   Diagnosis Date   • Cerebral infarction ([facility]) 07/15/2019     Patient Active Problem List    Diagnosis Date Noted   • Smoker 03/12/2013     Priority: Low   • Dysmenorrhea 03/12/2013     Priority: Low     History reviewed. No pertinent surgical history.      Scheduled:   • sodium chloride (PF)  2 mL Injection 2 times per day   • insulin lispro   Subcutaneous TID AC   • heparin (porcine)  80 Units/kg Intravenous Once   • atorvastatin  40 mg Oral Nightly      Continuous Infusions:   • dextrose 5 % infusion     • heparin (porcine) 75500 units/500 mL dextrose 5% standard infusion         ALLERGIES:  No Known Allergies    Social History     Socioeconomic History   • Marital status: /Civil Union     Spouse name: Not on file   •  Number of children: Not on file   • Years of education: Not on file   • Highest education level: Not on file   Occupational History   • Not on file   Social Needs   • Financial resource strain: Not on file   • Food insecurity:     Worry: Not on file     Inability: Not on file   • Transportation needs:     Medical: Not on file     Non-medical: Not on file   Tobacco Use   • Smoking status: Current Every Day Smoker     Packs/day: 1.00     Types: Cigarettes   • Smokeless tobacco: Never Used   Substance and Sexual Activity   • Alcohol use: Not on file     Comment: rarely   • Drug use: Never   • Sexual activity: Not on file   Lifestyle   • Physical activity:     Days per week: Not on file     Minutes per session: Not on file   • Stress: Not on file   Relationships   • Social connections:     Talks on phone: Not on file     Gets together: Not on file     Attends [employer] service: Not on file     Active member of club or organization: Not on file     Attends meetings of clubs or organizations: Not on file     Relationship status: Not on file   • Intimate partner violence:     Fear of current or ex partner: Not on file     Emotionally abused: Not on file     Physically abused: Not on file     Forced sexual activity: Not on file   Other Topics Concern   • Not on file   Social History Narrative   • Not on file       History reviewed. No pertinent family history.    REVIEW OF SYSTEMS:    Pertinent positives include see history of present illness. Otherwise complete review of systems is negative.    OBJECTIVE:  VITAL SIGNS:    Visit Vitals  /76   Pulse 56   Temp 96.8 °F (36 °C) (Oral)   Resp 13   Ht 5' 7\" (1.702 m)   Wt 64.8 kg   SpO2 99%   BMI 22.37 kg/m²     GENERAL:  Lisa Irving is a 58 year old female who is well developed and well nourished. She is in no acute distress and appears comfortable at rest.  HEENT:  Normocephalic, atraumatic. Pupils are equal round and reactive to light. Extraocular muscles are  intact. Conjunctivae are neither pale nor injected. Sclerae are anicteric. Oropharynx is moist without oral lesions.  NECK:  Neck is supple without jugular venous distention, bruit, or thyromegaly. Trachea is midline. Neck has normal range of motion.  BREASTS:  Deferred.  LUNGS:  Lungs are clear to auscultation bilaterally. There are no crackles or wheezes. Respiratory effort is normal.  HEART:  Heart has a regular rate and rhythm. There is a 1/6 systolic murmur heard throughout the precordium but slightly louder at the right upper sternal border. There is no radiation of the murmur. There are no rubs, or gallops. Occasional early beats are noted.  ABDOMEN:  Abdomen is soft, flat, nontender, and nondistended. Bowel sounds are present. No masses are appreciated. There is no hepatosplenomegaly. There is no rebound or guarding.  GENITOURINARY:  Deferred.  EXTREMITIES:  Extremities without cyanosis, clubbing, or edema.  SKIN:  Skin is warm and dry. There is no rash or suspicious lesion.  PULSES:  Radial, posterior tibial, and dorsalis pedis pulses are palpable and symmetric bilaterally.  NEUROLOGIC:  Alert and oriented x3. Cranial nerves II through XII are intact. Upper and lower extremity strength and sensation is completely intact throughout. She is able to do sequential circles of the fingers touching the thumb and fingers bilaterally without any problem. Deep tendon reflexes are symmetric throughout. Exam is nonfocal.    Lab Results   Component Value Date    SODIUM 135 07/15/2019    POTASSIUM 4.5 07/15/2019    CHLORIDE 97 (L) 07/15/2019    CO2 28 07/15/2019    BUN 16 07/15/2019    CREATININE 0.65 07/15/2019    GLUCOSE 314 (H) 07/15/2019     Lab Results   Component Value Date    WBC 9.2 07/15/2019    HCT 49.0 (H) 07/15/2019    HGB 16.6 (H) 07/15/2019     07/15/2019     No results found for: CHOLESTEROL, HDL, CALCLDL, TRIGLYCERIDE  Lab Results   Component Value Date    RAPDTR <0.02 07/15/2019       Recent Labs    Lab 07/15/19  0825   USPG <1.005*   UWBC NEGATIVE   URBC TRACE*   UNITR NEGATIVE   UBILI NEGATIVE   UPH 5.5   UBACTR NONE SEEN     Twelve-lead EKG shows sinus rhythm. Occasional PACs are noted.    IMAGING:    Xr Chest Ap Or Pa    Result Date: 7/15/2019  Narrative: EXAM: XR CHEST AP OR PA CLINICAL INDICATION: ams FINDINGS: There is no acute infiltrate or suspicious mass or edema. The heart and mediastinum are unremarkable. There is no acute bony abnormality.        Impression: IMPRESSION: No acute pulmonary findings.        Ct Head Level 1    Result Date: 7/15/2019  Narrative:    HEAD CT, NONCONTRAST HISTORY: Altered mental status. Lack of focus. COMPARISON: None. TECHNIQUE:  A noncontrast-enhanced head CT was performed.  Multiple axial images were obtained from the base of the skull through the vertex. Sagittal and coronal reformatted images were reviewed. The ventricles, basal cisterns and sulci over the convexities have a normal appearance for the patient's age. There is no direct or indirect sign of intracranial mass, neoplasm, hemorrhage or hydrocephalus. Posteriorly in the left centrum semiovale there is a 6.4 mm area of decreased density of indeterminate etiology. This may represent an area of ischemic change that may be acute or chronic. Primary parenchymal lesion such as demyelinating disease or a mass cannot be excluded in this area. There are no calvarial abnormalities.     Impression: IMPRESSION: Small focal area of decreased density posterior left centrum semiovale of indeterminate etiology. This may represent an area of acute or chronic ischemic change. Parenchymal lesion such is demyelinating disease or a mass cannot be excluded in this area. MRI the brain with and without gadolinium is recommended for further evaluation. Note: These results were discussed with the emergency room physician at 7:30 AM on 7/15/2019.     Ct Angiogram Head Neck W Contrast    Result Date: 7/15/2019  Narrative: CT  ANGIOGRAM HEAD NECK W CONTRAST HISTORY: Confusion, acute, unexplained COMPARISON: CT head from 7/15/2019. TECHNIQUE:  0.63 mm helical axial images were obtained through the [place] and neck arterial vasculature after the administration of 75 mL of intravenous Isovue 370.  Coronal and sagittal reformats as well as 3-D reconstructions were created. Postcontrast  5 mm images were obtained from the skull base to vertex. FINDINGS: CTA neck: Aortic arch and great vessels: There is normal aortic arch anatomy. The great vessels are patent. Right common carotid: Patent. Left common carotid: Patent. Right internal carotid: There is moderate calcified atherosclerotic plaque in the proximal internal carotid artery but no significant luminal narrowing is appreciated. Left internal carotid: Motion artifact compromises evaluation of the proximal left internal carotid artery. There is a large amount of soft plaque as well as some calcified atherosclerotic plaque in the proximal left internal carotid artery. Minimal luminal diameter is difficult to measure precisely due to motion artifact and due to small caliber of patent lumen but is approximately 0.8 mm, compared to a distal normal diameter of 3.7 mm. This is consistent with approximate 80% stenosis. It is noted that the atherosclerotic plaque is irregular and may be friable. Vertebral arteries: There is mild narrowing of the bilateral vertebral arteries at their origin. The remaining cervical bilateral vertebral arteries are patent. CTA head: Anterior circulation: Internal carotid arteries: Scattered mild to moderate calcified atherosclerotic plaque is noted in the cavernous segments of both internal carotid arteries. This results in mild luminal narrowing. Incidentally, there is an approximate 2 to 3 mm probable infundibulum at the right posterior communicating artery origin. Anterior cerebral arteries:Unremarkable. Middle cerebral arteries: Unremarkable Posterior  Hypertension circulation: Vertebral arteries: Unremarkable. Basilar artery: Unremarkable. Posterior cerebral arteries: Unremarkable. Cerebellar arteries: Unremarkable. Collateral circulation: The anterior and posterior communicating arteries are diminutive but patent. CT head: No mass effect, midline shift or abnormal enhancement is appreciated. The ventricles and sulci are within normal limits.     Impression: IMPRESSION: 1.  At least 80% stenosis of the proximal left internal carotid artery due to a large amount of predominantly noncalcified atherosclerotic plaque. This plaque appears irregular and may be friable. 2.  Mild narrowing of the bilateral vertebral artery origins. 3.  Unremarkable [place] (with the exception of probable 2-3 mm infundibulum at the right posterior communicating artery origin).     Mri Brain Wo Contrast    Result Date: 7/15/2019  Narrative: MR BRAIN WITHOUT CONTRAST CLINICAL INFORMATION: 58-year-old female with confusion. COMPARISON:  CT head 7/15/2019 TECHNIQUE:  Using a 1.5 [place]ina imaging system,  MR of the brain is performed utilizing sagittal T1, coronal T2, axial T1/T2/T2-FLAIR, axial DWI, and axial T2*-GRE, without gadolinium. FINDINGS: The brain is normal in structure, volume, and signal intensity for age.  There is no mass, hydrocephalus, acute or chronic hemorrhage.   The size and configuration of the ventricles and extraaxial spaces are within normal limits.  A few scattered areas of hyperintense signal are present on FLAIR and T2-weighted images within the periventricular white matter, especially posterior to the left lateral ventricle as well as subcortical posterior left parietal occipital convexity. Diffusion-weighted images demonstrates several foci of abnormally restricted diffusion, including the subcortical posterior left parietal occipital region, posterior to the body of the left lateral ventricle as well as a few tiny scattered abnormal restricted diffusion areas within the  subcortical regions of the high left parietal convexity. The number and distribution suggests left MCA emboli. The major intracranial flow voids are present.     Impression: IMPRESSION: 1. Findings suggesting acute multifocal emboli involving the left MCA distribution as described above. 2. A few minor chronic small vessel ischemic foci within the periventricular white matter. Results were discussed with Dr. Ndiaye at the time of the dictation.        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 Lopez. Will admit to ICU. Not a candidate for TPA or other acute surgical intervention. Start on heparin and continue for 24 hours. Discussed the risk of hemorrhage with heparin. Has received aspirin and Plavix. Follow blood pressure but do not lower aggressively. Start statin. Neuro checks. Stop smoking. Suspect source of embolism is in the left internal carotid artery friable plaque. Will check echo to rule out cardiac source.  Cardiac murmur    -Echo ordered. Further plan pending results.  Elevated blood pressure   -Follow closely. Do not lower aggressively at this point in time. If remains elevated though chronic medication.  Tobacco use disorder   -Patient is agreeable to start patch. She also is agreeable to medication such as Wellbutrin at the time of discharge. Greater than 3 minutes but less than 10 minutes in tobacco cessation counseling.  Hyperglycemia with glycosuria   -Check hemoglobin A1c. We will place on diabetic diet. One touches 4 times a day with sliding scale insulin. Will order C-peptide. Consider diabetic antibodies if needed.  Polycythemia  Will follow. Probably secondary to smoking.    Full code.    Tobacco Best Practice  Nicotine Replacement-ordered. She would be agreeable to Wellbutrin.    DEEP VEIN THROMBOSIS PROPHYLAXIS  [x]    SEE ORDERS  []     Pharmacologic not appropriate due to   []    Mechanical not appropriate due to   []    Neither appropriate due to ambulatory status and a low risk for deep vein thrombosis      [x]    Expect at least 2 night hospital stay due to clinical features.

## 2020-06-05 ENCOUNTER — INPATIENT (INPATIENT)
Facility: HOSPITAL | Age: 70
LOS: 7 days | Discharge: HOME | DRG: 554 | End: 2020-06-13
Attending: STUDENT IN AN ORGANIZED HEALTH CARE EDUCATION/TRAINING PROGRAM | Admitting: HOSPITALIST
Payer: MEDICAID

## 2020-06-05 VITALS
OXYGEN SATURATION: 99 % | SYSTOLIC BLOOD PRESSURE: 155 MMHG | HEART RATE: 77 BPM | TEMPERATURE: 99 F | DIASTOLIC BLOOD PRESSURE: 94 MMHG | RESPIRATION RATE: 16 BRPM

## 2020-06-05 PROCEDURE — 73564 X-RAY EXAM KNEE 4 OR MORE: CPT | Mod: 26,RT

## 2020-06-05 PROCEDURE — 99285 EMERGENCY DEPT VISIT HI MDM: CPT

## 2020-06-05 RX ORDER — KETOROLAC TROMETHAMINE 30 MG/ML
15 SYRINGE (ML) INJECTION ONCE
Refills: 0 | Status: DISCONTINUED | OUTPATIENT
Start: 2020-06-05 | End: 2020-06-05

## 2020-06-05 RX ORDER — ACETAMINOPHEN 500 MG
650 TABLET ORAL ONCE
Refills: 0 | Status: COMPLETED | OUTPATIENT
Start: 2020-06-05 | End: 2020-06-05

## 2020-06-05 RX ADMIN — Medication 15 MILLIGRAM(S): at 21:30

## 2020-06-05 RX ADMIN — Medication 650 MILLIGRAM(S): at 22:55

## 2020-06-05 NOTE — ED PROVIDER NOTE - OBJECTIVE STATEMENT
70 yo male with pmhx of CAD, gout (on colchicine), presenting with worsening right knee pain and difficulty walking over past few weeks. Per daughter Lidya ([phone]), patient lives alone, has been having worsening right knee swelling, difficulty extending right knee, with minimal ability to ambulate over past week 2/2 pain. Came to ED for further evaluation. Denies taking anything for pain.    Denies any falls, LOC, trauma, rash, fevers

## 2020-06-05 NOTE — ED PROVIDER NOTE - PROGRESS NOTE DETAILS
Steve Abraham PGY2  pt unable to ambulate despite pain medication, spoke to daughter, who states he lives alone, and family is "overwhelmed" due to inability to take care of patient. will obtain labs, and admit for pt rehab evaluation

## 2020-06-05 NOTE — ED ADULT NURSE NOTE - NSIMPLEMENTINTERV_GEN_ALL_ED
Implemented All Fall with Harm Risk Interventions:  [place] to call system. Call bell, personal items and telephone within reach. Instruct patient to call for assistance. Room bathroom lighting operational. Non-slip footwear when patient is off stretcher. Physically safe environment: no spills, clutter or unnecessary equipment. Stretcher in lowest position, wheels locked, appropriate side rails in place. Provide visual cue, wrist band, yellow gown, etc. Monitor gait and stability. Monitor for mental status changes and reorient to person, place, and time. Review medications for side effects contributing to fall risk. Reinforce activity limits and safety measures with patient and family. Provide visual clues: red socks.

## 2020-06-05 NOTE — ED ADULT NURSE NOTE - PAIN: PRESENCE, MLM
[facility] Admission Note    Subjective:     BG Shaun Fernandez is a 3.37 kg, 20\" female infant born at 7:46 PM on 2018 at [facility] [place] [facility]. Apgars: 8 and 9  Delivery Type: , Low Transverse   Delivery Resuscitation:  interventions required: Infant warmed, dried, and given tactile stimulation with good response.       Number of Vessels:    Cord Events:   Meconium Stained: Maternal Information:  Information for the patient's mother:  Ethel Tamayo [[id number]]   32 y.o. Information for the patient's mother:  Ethel Tamayo [[id number]]   G2     Information for the patient's mother:  Ethel Tamayo [[id number]]   38w6d     Information for the patient's mother:  Ethel Tamayo [[id number]]     Patient Active Problem List   Diagnosis Code    Obesity, morbid ([facility] - [facility] recommends growth scan at 32 weeks E66.01    CHTN - labetalol started at 9 weeks O10.919    Uterine size date discrepancy pregnancy, third trimester O26.843    Polyhydramnios in third trimester O40. 3XX0    PIH (pregnancy induced hypertension), third trimester O13.3    Pregnancy Z34.90    Gestational hypertension O13.9    Pregnancy with 39 completed weeks gestation Z3A.36    Bilateral lower extremity edema R60.0    Vaginal bleeding in pregnancy, third trimester O46.93    HTN in pregnancy, chronic O10.919     Information for the patient's mother:  Ethel Tamayo [[id number]]     Past Medical History:   Diagnosis Date    Bilateral lower extremity edema 2019    Gestational hypertension     Hypertension     Missed ab     Sickle cell trait ([facility]     Sickle cell trait syndrome ([facility]     Uterine size date discrepancy pregnancy, third trimester 2019     Information for the patient's mother:  Ethel Tamayo [[id number]]     Social History     Tobacco Use    Smoking status: Never Smoker    Smokeless tobacco: Never Used   Substance Use Topics    Alcohol use: Not Currently    Drug use:  No Information for the patient's mother:  Isaac Wild [[id number]]   Gestational Age: 38w7d   Prenatal Labs:  Lab Results   Component Value Date/Time    ABO/Rh(D) A POSITIVE 2019 04:30 PM    GrBStrep, External POSITIV E 2019        Additional Prenatal Labs:  HBsAg negative  Rubella immune  HIV NR  TP Ab NR  CT/ GC negative  Pregnancy complications:  Chronic HTN/ pre eclampsia with polyhydramnios      complications:  Failure to progress; GBS positive     Rupture of membranes: at delivery     Maternal antibiotics: Ancef prior to ; PCN - multiple doses for +GBS      Comments:     Infant's Current Medications: No current facility-administered medications for this encounter. Objective:     Visit Vitals  Pulse 138   Temp 99.2 °F (37.3 °C)   Resp 38   Ht 0.508 m Comment: Filed from Delivery Summary   Wt 3.37 kg Comment: Filed from Delivery Summary   HC 36.5 cm Comment: Filed from Delivery Summary   BMI 13.06 kg/m²     Birth weight: 3.37 kg  Percent weight change: 0%  General: Healthy-appearing, vigorous infant in no acute distress  Head: Anterior fontanelle soft and flat  Eyes: Pupils equal and reactive, red reflex normal bilaterally  Ears: Well-positioned, well-formed pinnae. Nose: Clear, normal mucosa  Mouth: Normal tongue, palate intact,  Neck: Normal structure  Chest: Lungs clear to auscultation, unlabored breathing  Heart: RRR, no murmurs, well-perfused  Abd: Soft, non-tender, no masses. Umbilical stump clean and dry  Hips: Negative Ferreira, Ortolani, gluteal creases equal  : Normal female genitalia  Extremities: No deformities, clavicles intact  Neuro: easily aroused, good symmetric tone, strength, reflexes. Positive root and suck. No results found for this or any previous visit (from the past 72 hour(s)). Assessment:     2 days day old female infant, doing well  C/S    Plan:     Routine normal  care as outlined in orders.   Encourage bf  Belem Fisher, MD  July 18, 2019 complains of pain/discomfort

## 2020-06-05 NOTE — ED PROVIDER NOTE - MUSCULOSKELETAL, MLM
right lateral knee TTP, negative valgus/varus stress testing. no effusion noted on knees. unable to fully extend right knee 2/2 pain. unable to ambulate. pain with extension of right knee but no pain with flexion. otherwise nontender elsewhere with no obvious effusions.

## 2020-06-05 NOTE — ED PROVIDER NOTE - ATTENDING CONTRIBUTION TO CARE
Dr Arzate Note:     Right knee pain x 1 week, with swelling, takes daily colchicine  Due to worsening pain presented to ED, pt has not taken anything for pain   No fevers, not red    Pt also notes "right leg cramping"      Right knee with no effusions palpated, non tender to exam, mild pain with extension of right knee   No pain with palpation of calf, no edema to RLE   sensation, strength and pulses intact       Low suspicion for infected joint, pt with hx gout with similar symptoms in past   No swelling, no obvious area to drain  Plan for NSAIDs, will call daughter for collateral information

## 2020-06-05 NOTE — ED ADULT NURSE NOTE - OBJECTIVE STATEMENT
Pt is a 69y male c/o R knee pain. Pt PMHx MI with stents x3, Gout, Kidney stones, hernias. Pt states this appears to be a gout flare up. Pt has had pain in his R arm last week which has lessened but the pain in his R knee has gotten to the point making walking difficult. Pt is able to move the R leg but complains of severe pain. Pt denies CP, SOB, dizziness, cough, sick contacts. Pt prefers to go by L[facility]f. Pt resting comfortably in bed. Pt educated on call bell use and call bell placed at bedside. Pt safety maintained.

## 2020-06-05 NOTE — ED PROVIDER NOTE - CLINICAL SUMMARY MEDICAL DECISION MAKING FREE TEXT BOX
70 yo male with CAD and gout presenting with right knee pain and inability to walk. will evaluate for effusion vs gout flare up with xray knee, will give pain control, and will reassess.

## 2020-06-06 DIAGNOSIS — M25.561 PAIN IN RIGHT KNEE: ICD-10-CM

## 2020-06-06 DIAGNOSIS — M10.9 GOUT, UNSPECIFIED: ICD-10-CM

## 2020-06-06 DIAGNOSIS — I10 ESSENTIAL (PRIMARY) HYPERTENSION: ICD-10-CM

## 2020-06-06 DIAGNOSIS — Z02.9 ENCOUNTER FOR ADMINISTRATIVE EXAMINATIONS, UNSPECIFIED: ICD-10-CM

## 2020-06-06 DIAGNOSIS — I25.118 ATHEROSCLEROTIC HEART DISEASE OF NATIVE CORONARY ARTERY WITH OTHER FORMS OF ANGINA PECTORIS: ICD-10-CM

## 2020-06-06 DIAGNOSIS — Z29.9 ENCOUNTER FOR PROPHYLACTIC MEASURES, UNSPECIFIED: ICD-10-CM

## 2020-06-06 DIAGNOSIS — Z98.890 OTHER SPECIFIED POSTPROCEDURAL STATES: Chronic | ICD-10-CM

## 2020-06-06 LAB
ALBUMIN SERPL ELPH-MCNC: 3.6 G/DL — SIGNIFICANT CHANGE UP (ref 3.3–5)
ALBUMIN SERPL ELPH-MCNC: 3.8 G/DL — SIGNIFICANT CHANGE UP (ref 3.3–5)
ALP SERPL-CCNC: 72 U/L — SIGNIFICANT CHANGE UP (ref 40–120)
ALP SERPL-CCNC: 74 U/L — SIGNIFICANT CHANGE UP (ref 40–120)
ALT FLD-CCNC: 21 U/L — SIGNIFICANT CHANGE UP (ref 10–45)
ALT FLD-CCNC: 24 U/L — SIGNIFICANT CHANGE UP (ref 10–45)
ANION GAP SERPL CALC-SCNC: 12 MMOL/L — SIGNIFICANT CHANGE UP (ref 5–17)
ANION GAP SERPL CALC-SCNC: 13 MMOL/L — SIGNIFICANT CHANGE UP (ref 5–17)
ANION GAP SERPL CALC-SCNC: 13 MMOL/L — SIGNIFICANT CHANGE UP (ref 5–17)
AST SERPL-CCNC: 13 U/L — SIGNIFICANT CHANGE UP (ref 10–40)
AST SERPL-CCNC: 26 U/L — SIGNIFICANT CHANGE UP (ref 10–40)
BASOPHILS # BLD AUTO: 0.02 K/UL — SIGNIFICANT CHANGE UP (ref 0–0.2)
BASOPHILS # BLD AUTO: 0.03 K/UL — SIGNIFICANT CHANGE UP (ref 0–0.2)
BASOPHILS NFR BLD AUTO: 0.2 % — SIGNIFICANT CHANGE UP (ref 0–2)
BASOPHILS NFR BLD AUTO: 0.4 % — SIGNIFICANT CHANGE UP (ref 0–2)
BILIRUB SERPL-MCNC: 0.5 MG/DL — SIGNIFICANT CHANGE UP (ref 0.2–1.2)
BILIRUB SERPL-MCNC: 0.6 MG/DL — SIGNIFICANT CHANGE UP (ref 0.2–1.2)
BUN SERPL-MCNC: 26 MG/DL — HIGH (ref 7–23)
BUN SERPL-MCNC: 27 MG/DL — HIGH (ref 7–23)
BUN SERPL-MCNC: 28 MG/DL — HIGH (ref 7–23)
CALCIUM SERPL-MCNC: 9.1 MG/DL — SIGNIFICANT CHANGE UP (ref 8.4–10.5)
CALCIUM SERPL-MCNC: 9.4 MG/DL — SIGNIFICANT CHANGE UP (ref 8.4–10.5)
CALCIUM SERPL-MCNC: 9.6 MG/DL — SIGNIFICANT CHANGE UP (ref 8.4–10.5)
CHLORIDE SERPL-SCNC: 105 MMOL/L — SIGNIFICANT CHANGE UP (ref 96–108)
CHLORIDE SERPL-SCNC: 105 MMOL/L — SIGNIFICANT CHANGE UP (ref 96–108)
CHLORIDE SERPL-SCNC: 106 MMOL/L — SIGNIFICANT CHANGE UP (ref 96–108)
CO2 SERPL-SCNC: 18 MMOL/L — LOW (ref 22–31)
CO2 SERPL-SCNC: 21 MMOL/L — LOW (ref 22–31)
CO2 SERPL-SCNC: 22 MMOL/L — SIGNIFICANT CHANGE UP (ref 22–31)
CREAT SERPL-MCNC: 1.03 MG/DL — SIGNIFICANT CHANGE UP (ref 0.5–1.3)
CREAT SERPL-MCNC: 1.09 MG/DL — SIGNIFICANT CHANGE UP (ref 0.5–1.3)
CREAT SERPL-MCNC: 1.23 MG/DL — SIGNIFICANT CHANGE UP (ref 0.5–1.3)
CRP SERPL-MCNC: 1.55 MG/DL — HIGH (ref 0–0.4)
EOSINOPHIL # BLD AUTO: 0.19 K/UL — SIGNIFICANT CHANGE UP (ref 0–0.5)
EOSINOPHIL # BLD AUTO: 0.22 K/UL — SIGNIFICANT CHANGE UP (ref 0–0.5)
EOSINOPHIL NFR BLD AUTO: 2.4 % — SIGNIFICANT CHANGE UP (ref 0–6)
EOSINOPHIL NFR BLD AUTO: 2.5 % — SIGNIFICANT CHANGE UP (ref 0–6)
ERYTHROCYTE [SEDIMENTATION RATE] IN BLOOD: 61 MM/HR — HIGH (ref 0–20)
GLUCOSE SERPL-MCNC: 104 MG/DL — HIGH (ref 70–99)
GLUCOSE SERPL-MCNC: 97 MG/DL — SIGNIFICANT CHANGE UP (ref 70–99)
GLUCOSE SERPL-MCNC: 99 MG/DL — SIGNIFICANT CHANGE UP (ref 70–99)
HCT VFR BLD CALC: 39.1 % — SIGNIFICANT CHANGE UP (ref 39–50)
HCT VFR BLD CALC: 40.8 % — SIGNIFICANT CHANGE UP (ref 39–50)
HGB BLD-MCNC: 12.7 G/DL — LOW (ref 13–17)
HGB BLD-MCNC: 13 G/DL — SIGNIFICANT CHANGE UP (ref 13–17)
IMM GRANULOCYTES NFR BLD AUTO: 0.6 % — SIGNIFICANT CHANGE UP (ref 0–1.5)
IMM GRANULOCYTES NFR BLD AUTO: 0.6 % — SIGNIFICANT CHANGE UP (ref 0–1.5)
LYMPHOCYTES # BLD AUTO: 1.43 K/UL — SIGNIFICANT CHANGE UP (ref 1–3.3)
LYMPHOCYTES # BLD AUTO: 17.8 % — SIGNIFICANT CHANGE UP (ref 13–44)
LYMPHOCYTES # BLD AUTO: 2.02 K/UL — SIGNIFICANT CHANGE UP (ref 1–3.3)
LYMPHOCYTES # BLD AUTO: 22.9 % — SIGNIFICANT CHANGE UP (ref 13–44)
MAGNESIUM SERPL-MCNC: 2.2 MG/DL — SIGNIFICANT CHANGE UP (ref 1.6–2.6)
MCHC RBC-ENTMCNC: 26.3 PG — LOW (ref 27–34)
MCHC RBC-ENTMCNC: 26.8 PG — LOW (ref 27–34)
MCHC RBC-ENTMCNC: 31.9 GM/DL — LOW (ref 32–36)
MCHC RBC-ENTMCNC: 32.5 GM/DL — SIGNIFICANT CHANGE UP (ref 32–36)
MCV RBC AUTO: 82.4 FL — SIGNIFICANT CHANGE UP (ref 80–100)
MCV RBC AUTO: 82.5 FL — SIGNIFICANT CHANGE UP (ref 80–100)
MONOCYTES # BLD AUTO: 0.58 K/UL — SIGNIFICANT CHANGE UP (ref 0–0.9)
MONOCYTES # BLD AUTO: 0.63 K/UL — SIGNIFICANT CHANGE UP (ref 0–0.9)
MONOCYTES NFR BLD AUTO: 7.2 % — SIGNIFICANT CHANGE UP (ref 2–14)
MONOCYTES NFR BLD AUTO: 7.2 % — SIGNIFICANT CHANGE UP (ref 2–14)
NEUTROPHILS # BLD AUTO: 5.76 K/UL — SIGNIFICANT CHANGE UP (ref 1.8–7.4)
NEUTROPHILS # BLD AUTO: 5.87 K/UL — SIGNIFICANT CHANGE UP (ref 1.8–7.4)
NEUTROPHILS NFR BLD AUTO: 66.6 % — SIGNIFICANT CHANGE UP (ref 43–77)
NEUTROPHILS NFR BLD AUTO: 71.6 % — SIGNIFICANT CHANGE UP (ref 43–77)
NRBC # BLD: 0 /100 WBCS — SIGNIFICANT CHANGE UP (ref 0–0)
NRBC # BLD: 0 /100 WBCS — SIGNIFICANT CHANGE UP (ref 0–0)
PHOSPHATE SERPL-MCNC: 4.1 MG/DL — SIGNIFICANT CHANGE UP (ref 2.5–4.5)
PLATELET # BLD AUTO: 451 K/UL — HIGH (ref 150–400)
PLATELET # BLD AUTO: 464 K/UL — HIGH (ref 150–400)
POTASSIUM SERPL-MCNC: 4.7 MMOL/L — SIGNIFICANT CHANGE UP (ref 3.5–5.3)
POTASSIUM SERPL-MCNC: 4.8 MMOL/L — SIGNIFICANT CHANGE UP (ref 3.5–5.3)
POTASSIUM SERPL-MCNC: 5.9 MMOL/L — HIGH (ref 3.5–5.3)
POTASSIUM SERPL-SCNC: 4.7 MMOL/L — SIGNIFICANT CHANGE UP (ref 3.5–5.3)
POTASSIUM SERPL-SCNC: 4.8 MMOL/L — SIGNIFICANT CHANGE UP (ref 3.5–5.3)
POTASSIUM SERPL-SCNC: 5.9 MMOL/L — HIGH (ref 3.5–5.3)
PROT SERPL-MCNC: 7.1 G/DL — SIGNIFICANT CHANGE UP (ref 6–8.3)
PROT SERPL-MCNC: 7.1 G/DL — SIGNIFICANT CHANGE UP (ref 6–8.3)
RBC # BLD: 4.74 M/UL — SIGNIFICANT CHANGE UP (ref 4.2–5.8)
RBC # BLD: 4.95 M/UL — SIGNIFICANT CHANGE UP (ref 4.2–5.8)
RBC # FLD: 12.7 % — SIGNIFICANT CHANGE UP (ref 10.3–14.5)
RBC # FLD: 12.8 % — SIGNIFICANT CHANGE UP (ref 10.3–14.5)
SARS-COV-2 RNA SPEC QL NAA+PROBE: SIGNIFICANT CHANGE UP
SODIUM SERPL-SCNC: 137 MMOL/L — SIGNIFICANT CHANGE UP (ref 135–145)
SODIUM SERPL-SCNC: 139 MMOL/L — SIGNIFICANT CHANGE UP (ref 135–145)
SODIUM SERPL-SCNC: 139 MMOL/L — SIGNIFICANT CHANGE UP (ref 135–145)
URATE SERPL-MCNC: 10.7 MG/DL — HIGH (ref 3.4–8.8)
WBC # BLD: 8.04 K/UL — SIGNIFICANT CHANGE UP (ref 3.8–10.5)
WBC # BLD: 8.81 K/UL — SIGNIFICANT CHANGE UP (ref 3.8–10.5)
WBC # FLD AUTO: 8.04 K/UL — SIGNIFICANT CHANGE UP (ref 3.8–10.5)
WBC # FLD AUTO: 8.81 K/UL — SIGNIFICANT CHANGE UP (ref 3.8–10.5)

## 2020-06-06 PROCEDURE — 12345: CPT | Mod: NC,GC

## 2020-06-06 PROCEDURE — 71045 X-RAY EXAM CHEST 1 VIEW: CPT | Mod: 26

## 2020-06-06 PROCEDURE — 99223 1ST HOSP IP/OBS HIGH 75: CPT

## 2020-06-06 RX ORDER — ENOXAPARIN SODIUM 100 MG/ML
40 INJECTION SUBCUTANEOUS DAILY
Refills: 0 | Status: DISCONTINUED | OUTPATIENT
Start: 2020-06-06 | End: 2020-06-13

## 2020-06-06 RX ORDER — LISINOPRIL 2.5 MG/1
10 TABLET ORAL DAILY
Refills: 0 | Status: DISCONTINUED | OUTPATIENT
Start: 2020-06-06 | End: 2020-06-13

## 2020-06-06 RX ORDER — ACETAMINOPHEN 500 MG
650 TABLET ORAL EVERY 6 HOURS
Refills: 0 | Status: DISCONTINUED | OUTPATIENT
Start: 2020-06-06 | End: 2020-06-13

## 2020-06-06 RX ORDER — CLOPIDOGREL BISULFATE 75 MG/1
75 TABLET, FILM COATED ORAL DAILY
Refills: 0 | Status: DISCONTINUED | OUTPATIENT
Start: 2020-06-06 | End: 2020-06-13

## 2020-06-06 RX ORDER — LISINOPRIL 2.5 MG/1
10 TABLET ORAL DAILY
Refills: 0 | Status: DISCONTINUED | OUTPATIENT
Start: 2020-06-06 | End: 2020-06-06

## 2020-06-06 RX ORDER — SODIUM CHLORIDE 9 MG/ML
500 INJECTION, SOLUTION INTRAVENOUS
Refills: 0 | Status: COMPLETED | OUTPATIENT
Start: 2020-06-06 | End: 2020-06-06

## 2020-06-06 RX ORDER — BISOPROLOL FUMARATE 10 MG/1
5 TABLET, FILM COATED ORAL DAILY
Refills: 0 | Status: DISCONTINUED | OUTPATIENT
Start: 2020-06-06 | End: 2020-06-13

## 2020-06-06 RX ORDER — CLOPIDOGREL BISULFATE 75 MG/1
1 TABLET, FILM COATED ORAL
Qty: 0 | Refills: 0 | DISCHARGE

## 2020-06-06 RX ADMIN — CLOPIDOGREL BISULFATE 75 MILLIGRAM(S): 75 TABLET, FILM COATED ORAL at 12:16

## 2020-06-06 RX ADMIN — Medication 650 MILLIGRAM(S): at 12:17

## 2020-06-06 RX ADMIN — BISOPROLOL FUMARATE 5 MILLIGRAM(S): 10 TABLET, FILM COATED ORAL at 06:31

## 2020-06-06 RX ADMIN — Medication 650 MILLIGRAM(S): at 06:31

## 2020-06-06 RX ADMIN — SODIUM CHLORIDE 50 MILLILITER(S): 9 INJECTION, SOLUTION INTRAVENOUS at 22:45

## 2020-06-06 RX ADMIN — SODIUM CHLORIDE 50 MILLILITER(S): 9 INJECTION, SOLUTION INTRAVENOUS at 12:39

## 2020-06-06 RX ADMIN — Medication 40 MILLIGRAM(S): at 12:39

## 2020-06-06 RX ADMIN — LISINOPRIL 10 MILLIGRAM(S): 2.5 TABLET ORAL at 06:31

## 2020-06-06 RX ADMIN — ENOXAPARIN SODIUM 40 MILLIGRAM(S): 100 INJECTION SUBCUTANEOUS at 12:16

## 2020-06-06 RX ADMIN — Medication 500 MILLIGRAM(S): at 06:31

## 2020-06-06 NOTE — PROGRESS NOTE ADULT - PROBLEM SELECTOR PLAN 2
-c/w plavix, bb, ace-i  -off aspirin -c/w plavix, bisprolol 5 daily, lisinopril 10 daily  -off aspirin

## 2020-06-06 NOTE — PROGRESS NOTE ADULT - PROBLEM SELECTOR PLAN 1
-recurrent gout flare; overall presentation without fever/leukocytosis, no significant effusion on exam, and imaging without significant inflammatory change or large effusion  -admit to medicine for further mgt/workup  -s/p toradol in ER.  Given stable Scr and no hx of bleeding, NSAIDS seems reasonable for treatment.  Will start naproxen 500 bid and assess for response  -check uric acid  -PT eval  -fall prec -recurrent gout flare; overall presentation without fever/leukocytosis, no significant effusion on exam, and imaging without significant inflammatory change or large effusion  -s/p toradol in ER. No significant improvement w/naproxen  -started prednisone 40 mg daily  -elevated uric acid level  -PT eval, needed PT in prior admissions  -fall prec

## 2020-06-06 NOTE — H&P ADULT - PROBLEM SELECTOR PLAN 1
-recurrent gout flare; overall presentation without fever/leukocytosis, no significant effusion on exam, and imaging without significant inflammatory change or large effusion  -admit to medicine for further mgt/workup  -s/p toradol in ER.  Given stable Scr and no hx of bleeding, NSAIDS seems reasonable for treatment.  Will start naproxen 500 bid and assess for response  -check uric acid  -PT eval  -fall prec

## 2020-06-06 NOTE — H&P ADULT - NSHPREVIEWOFSYSTEMS_GEN_ALL_CORE
REVIEW OF SYSTEMS:  CONSTITUTIONAL: No weakness. No fevers. No chills. No weight loss. Good appetite.  EYES: No visual changes. No eye pain.  ENT: No hearing difficulty. No vertigo. No dysphagia. No Sinusitis/rhinorrhea.  NECK: No pain. No stiffness/rigidity.  CARDIAC: No chest pain. No palpitations.  RESPIRATORY: No cough. No SOB. No hemoptysis.  GASTROINTESTINAL: No abdominal pain. No nausea. No vomiting. No hematemesis. No diarrhea. No constipation. No melena. No hematochezia.  GENITOURINARY: No dysuria. No frequency. No hesitancy. No hematuria.  NEUROLOGICAL: No numbness. No focal weakness. No incontinence. No headache.  BACK: No back pain.  EXTREMITIES: No lower extremity edema. +dec ROM RLE 2/2 R knee pain.  SKIN: No rashes. No itching. No other lesions.  PSYCHIATRIC: No depression. No anxiety. No SI/HI.  ALLERGIC: No lip swelling. No hives.  All other review of systems is negative unless indicated above.
- - -

## 2020-06-06 NOTE — H&P ADULT - HISTORY OF PRESENT ILLNESS
69 M PMH CAD, HTN, Gout, p/w R knee pain.     VS: 98.6, 77, 155/94, 16, 99% RA.  Labs: mild chronic stable anemia, no leukocytosis, thrombocytosis, hemolyzed K, stable/intact gfr, CRP 1.55.  CXR prelim KAYCE.  XR R knee: no acute fx/dislocation, +small effusion. In ER pt received tylenol and IV toradol 15 x 1 prior to medicine team involvement. [employer] services provided by [employer] ID# [id number]  69 M PMH CAD s/p stents, HTN, Gout, p/w R knee pain. Call placed to daughter Lisa for collateral.  Pt has had 2-3 weeks of progressive R knee pain. +mild swelling +pain worse with movement. Pt states he has had similar sx in past that have been dx as gout flares, and medically managed.  No prior tap of knee effusion. Rx on prophylactic colchicine which he states he is compliant with.  Daughter adds that pt has been largely "inactive, immobile" due to the pain from the R knee.  Pt denies cp, sob, f/c, n/v/d, dysuria, cough. NO trauma to knee, no falls per daughter. Lives alone; daughter adds that she feels he is unable to care for himself alone and may require HHA or rehab upon d/c.  Pt/family deny bruising/bleeding, gastritis, hematochezia/melena.     VS: 98.6, 77, 155/94, 16, 99% RA.  Labs: mild chronic stable anemia, no leukocytosis, thrombocytosis, hemolyzed K, stable/intact gfr, CRP 1.55.  CXR prelim KAYCE.  XR R knee: no acute fx/dislocation, +small effusion. In ER pt received tylenol and IV toradol 15 x 1 prior to medicine team involvement.

## 2020-06-06 NOTE — H&P ADULT - NSICDXPASTMEDICALHX_GEN_ALL_CORE_FT
PAST MEDICAL HISTORY:  CAD (coronary artery disease)     Gout     Gout     Hernia     HLD (hyperlipidemia)     Hypertension     Kidney stones     MI (myocardial infarction) with 3 stents

## 2020-06-06 NOTE — H&P ADULT - NSHPPHYSICALEXAM_GEN_ALL_CORE
PHYSICAL EXAM:    Vital Signs Last 24 Hrs  T(C): 37 (05 Jun 2020 20:39), Max: 37 (05 Jun 2020 20:39)  T(F): 98.6 (05 Jun 2020 20:39), Max: 98.6 (05 Jun 2020 20:39)  HR: 77 (05 Jun 2020 20:39) (77 - 77)  BP: 155/94 (05 Jun 2020 20:39) (155/94 - 155/94)  BP(mean): --  RR: 16 (05 Jun 2020 20:39) (16 - 16)  SpO2: 99% (05 Jun 2020 20:39) (99% - 99%)    GENERAL: NAD, well-groomed, well-developed  HEAD:  Atraumatic, Normocephalic  EYES: EOMI, PERRLA, conjunctiva and sclera clear  ENMT: No tonsillar erythema, exudates, or enlargement; Moist mucous membranes, Good dentition, No lesions  NECK: Supple, No JVD, Normal thyroid  CHEST/LUNG: Clear to percussion bilaterally; No rales, rhonchi, wheezing, or rubs  HEART: Regular rate and rhythm; No murmurs, rubs, or gallops  ABDOMEN: Soft, Nontender, Nondistended; Bowel sounds present  EXTREMITIES:  2+ Peripheral Pulses, No clubbing, cyanosis, or edema  LYMPH: No lymphadenopathy noted  SKIN: No rashes or lesions  NERVOUS SYSTEM:  Alert & Oriented X3, Good concentration; Motor Strength 5/5 B/L upper and lower extremities; DTRs 2+ intact and symmetric PHYSICAL EXAM:    Vital Signs Last 24 Hrs  T(C): 37 (05 Jun 2020 20:39), Max: 37 (05 Jun 2020 20:39)  T(F): 98.6 (05 Jun 2020 20:39), Max: 98.6 (05 Jun 2020 20:39)  HR: 77 (05 Jun 2020 20:39) (77 - 77)  BP: 155/94 (05 Jun 2020 20:39) (155/94 - 155/94)  BP(mean): --  RR: 16 (05 Jun 2020 20:39) (16 - 16)  SpO2: 99% (05 Jun 2020 20:39) (99% - 99%)    GENERAL: NAD, well-groomed, well-developed  HEAD:  Atraumatic, Normocephalic  EYES: EOMI, PERRLA, conjunctiva and sclera clear  ENMT: No tonsillar erythema, exudates, or enlargement; Moist mucous membranes, Good dentition, No lesions  NECK: Supple, No JVD, Normal thyroid  CHEST/LUNG: Clear to percussion bilaterally; No rales, rhonchi, wheezing, or rubs no resp distress or acc. muscle usage  HEART: Regular rate and rhythm; No murmurs, rubs, or gallops, no sig Le edema  ABDOMEN: Soft, Nontender, Nondistended; Bowel sounds present, no rebound/guarding  EXTREMITIES:  2+ Peripheral Pulses, No clubbing, cyanosis. +R knee mild warmth cmpared to left, no redness. MIld TTP over lateral aspect. Dec ROM with passive/active flexion/extension of R knee limited by pain. NO sig effusion felt.   LYMPH: No lymphadenopathy noted, no lymphangitis  SKIN: No rashes or lesions  NERVOUS SYSTEM:  Alert & Oriented X3, Good concentration; Motor Strength 5/5 B/L upper and lower extremities. no facial droop no dysarthria.   PSYCH: normal affect no si no hi

## 2020-06-06 NOTE — PROGRESS NOTE ADULT - SUBJECTIVE AND OBJECTIVE BOX
[facility] Division of Internal Medicine  Valery Tolentinoidi, PGY-1  Pager: [facility]: [phone] | [facility]: [phone]    Patient is a 69y old  Male who presents with a chief complaint of gout flare (06 Jun 2020 02:38)      SUBJECTIVE / OVERNIGHT EVENTS: No acute events overnight   ADDITIONAL REVIEW OF SYSTEMS: +R knee pain, +warmth     12 point ROS negative except as above     MEDICATIONS  (STANDING):  bisoprolol   Tablet 5 milliGRAM(s) Oral daily  clopidogrel Tablet 75 milliGRAM(s) Oral daily  enoxaparin Injectable 40 milliGRAM(s) SubCutaneous daily  lisinopril 10 milliGRAM(s) Oral daily  naproxen 500 milliGRAM(s) Oral two times a day    MEDICATIONS  (PRN):  acetaminophen   Tablet .. 650 milliGRAM(s) Oral every 6 hours PRN Temp greater or equal to 38C (100.4F), Mild Pain (1 - 3), Moderate Pain (4 - 6), Severe Pain (7 - 10)      CAPILLARY BLOOD GLUCOSE        I&O's Summary    05 Jun 2020 07:01  -  06 Jun 2020 07:00  --------------------------------------------------------  IN: 150 mL / OUT: 0 mL / NET: 150 mL        PHYSICAL EXAM:  Vital Signs Last 24 Hrs  T(C): 36.6 (06 Jun 2020 04:54), Max: 37 (05 Jun 2020 20:39)  T(F): 97.9 (06 Jun 2020 04:54), Max: 98.6 (05 Jun 2020 20:39)  HR: 73 (06 Jun 2020 06:30) (65 - 77)  BP: 158/93 (06 Jun 2020 06:30) (152/76 - 158/93)  BP(mean): --  RR: 17 (06 Jun 2020 04:54) (16 - 17)  SpO2: 98% (06 Jun 2020 04:54) (98% - 99%)    GENERAL: NAD, well-groomed, well-developed  	HEAD:  Atraumatic, Normocephalic  	EYES: EOMI, PERRLA, conjunctiva and sclera clear  	ENMT: No tonsillar erythema, exudates, or enlargement; Moist mucous membranes, Good dentition, No lesions  	NECK: Supple, No JVD, Normal thyroid  	CHEST/LUNG: Clear to percussion bilaterally; No rales, rhonchi, wheezing, or rubs no resp distress or acc. muscle usage  	HEART: Regular rate and rhythm; No murmurs, rubs, or gallops, no sig Le edema  	ABDOMEN: Soft, Nontender, Nondistended; Bowel sounds present, no rebound/guarding  	EXTREMITIES:  2+ Peripheral Pulses, No clubbing, cyanosis. +R knee mild warmth cmpared to left, no redness. MIld TTP over lateral aspect. Dec ROM with passive/active flexion/extension of R knee limited by pain. NO sig effusion felt.   	LYMPH: No lymphadenopathy noted, no lymphangitis  	SKIN: No rashes or lesions  	NERVOUS SYSTEM:  Alert & Oriented X3, Good concentration; Motor Strength 5/5 B/L upper and lower extremities. no facial droop no dysarthria.   PSYCH: normal affect no si no hi    LABS:                        12.7   8.81  )-----------( 451      ( 06 Jun 2020 01:14 )             39.1     06-06    139  |  105  |  28<H>  ----------------------------<  104<H>  4.8   |  22  |  1.23    Ca    9.1      06 Jun 2020 02:29    TPro  7.1  /  Alb  3.6  /  TBili  0.5  /  DBili  x   /  AST  26  /  ALT  24  /  AlkPhos  72  06-06                RADIOLOGY & ADDITIONAL TESTS:  Results Reviewed:   Imaging Personally Reviewed:  Electrocardiogram Personally Reviewed:    COORDINATION OF CARE:  Care Discussed with Consultants/Other Providers [Y/N]:  Prior or Outpatient Records Reviewed [Y/N]: [facility] Division of Internal Medicine  Valery Tolentinoidi, PGY-1  Pager: [facility]: [phone] | [facility]: [phone]    Patient is a 69y old  Male who presents with a chief complaint of gout flare (06 Jun 2020 02:38)      SUBJECTIVE / OVERNIGHT EVENTS: No acute events overnight   ADDITIONAL REVIEW OF SYSTEMS: +R knee pain, +warmth no nausea/vomiting/diarrhea/abd pain/fever/chills     12 point ROS negative except as above     MEDICATIONS  (STANDING):  bisoprolol   Tablet 5 milliGRAM(s) Oral daily  clopidogrel Tablet 75 milliGRAM(s) Oral daily  enoxaparin Injectable 40 milliGRAM(s) SubCutaneous daily  lisinopril 10 milliGRAM(s) Oral daily  naproxen 500 milliGRAM(s) Oral two times a day    MEDICATIONS  (PRN):  acetaminophen   Tablet .. 650 milliGRAM(s) Oral every 6 hours PRN Temp greater or equal to 38C (100.4F), Mild Pain (1 - 3), Moderate Pain (4 - 6), Severe Pain (7 - 10)      CAPILLARY BLOOD GLUCOSE        I&O's Summary    05 Jun 2020 07:01  -  06 Jun 2020 07:00  --------------------------------------------------------  IN: 150 mL / OUT: 0 mL / NET: 150 mL        PHYSICAL EXAM:  Vital Signs Last 24 Hrs  T(C): 36.6 (06 Jun 2020 04:54), Max: 37 (05 Jun 2020 20:39)  T(F): 97.9 (06 Jun 2020 04:54), Max: 98.6 (05 Jun 2020 20:39)  HR: 73 (06 Jun 2020 06:30) (65 - 77)  BP: 158/93 (06 Jun 2020 06:30) (152/76 - 158/93)  BP(mean): --  RR: 17 (06 Jun 2020 04:54) (16 - 17)  SpO2: 98% (06 Jun 2020 04:54) (98% - 99%)    GENERAL: NAD, well-groomed, well-developed  	HEAD:  Atraumatic, Normocephalic  	EYES: EOMI, conjunctiva and sclera clear  	ENMT: No tonsillar erythema, exudates, or enlargement; Moist mucous membranes, Good dentition, No lesions  	NECK: Supple, No JVD, Normal thyroid  	CHEST/LUNG: Clear to percussion bilaterally; No rales, rhonchi, wheezing, or rubs no resp distress or acc. muscle usage  	HEART: Regular rate and rhythm; No murmurs, rubs, or gallops, no sig Le edema  	ABDOMEN: Soft, Nontender, Nondistended; Bowel sounds present, no rebound/guarding  	EXTREMITIES:  2+ Peripheral Pulses, No clubbing, cyanosis. +R knee mild warmth vs left, no redness. MIld TTP over lateral aspect and medial joint. Dec ROM with passive/active flexion/extension of R knee limited by pain. NO effusion felt.   	LYMPH: No lymphadenopathy noted, no lymphangitis  	SKIN: No rashes or lesions  	NERVOUS SYSTEM:  Alert & Oriented X3, Good concentration; Motor Strength 5/5 B/L upper and lower extremities. no facial droop no dysarthria.   PSYCH: normal affect no si no hi    LABS:                        12.7   8.81  )-----------( 451      ( 06 Jun 2020 01:14 )             39.1     06-06    139  |  105  |  28<H>  ----------------------------<  104<H>  4.8   |  22  |  1.23    Ca    9.1      06 Jun 2020 02:29    TPro  7.1  /  Alb  3.6  /  TBili  0.5  /  DBili  x   /  AST  26  /  ALT  24  /  AlkPhos  72  06-06                RADIOLOGY & ADDITIONAL TESTS:  Results Reviewed:   Imaging Personally Reviewed:  Electrocardiogram Personally Reviewed:    COORDINATION OF CARE:  Care Discussed with Consultants/Other Providers [Y/N]:  Prior or Outpatient Records Reviewed [Y/N]:

## 2020-06-06 NOTE — PROGRESS NOTE ADULT - ATTENDING COMMENTS
Patient seen and examined today. I agree with the above findings, assessment, and plan with the following additions and exceptions:     In brief, this is a pleasant 69 year old Male PMHx CAD s/p stents, HTN, and Gout here with right knee arthropathy likely gout flair. D/c nsaids. Prednisone 40 mg po daily followed by taper after response. If does not work, will call rheum for intraarticular steroid injection. Patient appears hypovolemic on my exam. 500 cc of LR. Monitor uremia. Low suspicion for septic joint. After flair resolution, will need to be on allopurinol given elevated serum uric acid.     Rest of plan as above    Dr. Wei Aguilar, DO  Attending Physician  Division of Hospital Medicine  [facility]  Pager:  [phone]

## 2020-06-06 NOTE — H&P ADULT - NSICDXPASTSURGICALHX_GEN_ALL_CORE_FT
No future appointments. Patient needs to f/up with Dr Alex Chen in the office to indicate that the oxygen is working and that the patient still needs it     The patient indicated that she doesn't really use it.  told her to hang on to it.      I advise PAST SURGICAL HISTORY:  H/O hernia repair

## 2020-06-06 NOTE — H&P ADULT - NSHPSOCIALHISTORY_GEN_ALL_CORE
Social History:      Occupation: retired  Lives with: (  x) alone  (  ) children   (  ) spouse   (  ) parents  (  ) other    Substance Use (street drugs): (  x) never used  (  ) other:  Tobacco Usage:  (   ) never smoked   ( x  ) former smoker   (   ) current smoker  (     ) pack year  (        ) last cigarette date  Alcohol Usage: occasional.

## 2020-06-06 NOTE — H&P ADULT - NSHPLABSRESULTS_GEN_ALL_CORE
Labs personally reviewed mild chronic stable anemia, no leukocytosis, thrombocytosis, hemolyzed K, stable/intact gfr, CRP 1.55.    Imaging personally reviewed CXR prelim KAYCE.  XR R knee: no acute fx/dislocation, +small effusion   EKG personally reviewed

## 2020-06-07 ENCOUNTER — TRANSCRIPTION ENCOUNTER (OUTPATIENT)
Age: 70
End: 2020-06-07

## 2020-06-07 LAB
ALBUMIN SERPL ELPH-MCNC: 4.2 G/DL — SIGNIFICANT CHANGE UP (ref 3.3–5)
ALP SERPL-CCNC: 77 U/L — SIGNIFICANT CHANGE UP (ref 40–120)
ALT FLD-CCNC: 22 U/L — SIGNIFICANT CHANGE UP (ref 10–45)
ANION GAP SERPL CALC-SCNC: 12 MMOL/L — SIGNIFICANT CHANGE UP (ref 5–17)
AST SERPL-CCNC: 12 U/L — SIGNIFICANT CHANGE UP (ref 10–40)
BILIRUB SERPL-MCNC: 0.6 MG/DL — SIGNIFICANT CHANGE UP (ref 0.2–1.2)
BUN SERPL-MCNC: 23 MG/DL — SIGNIFICANT CHANGE UP (ref 7–23)
CALCIUM SERPL-MCNC: 9.8 MG/DL — SIGNIFICANT CHANGE UP (ref 8.4–10.5)
CHLORIDE SERPL-SCNC: 104 MMOL/L — SIGNIFICANT CHANGE UP (ref 96–108)
CO2 SERPL-SCNC: 23 MMOL/L — SIGNIFICANT CHANGE UP (ref 22–31)
CREAT SERPL-MCNC: 0.96 MG/DL — SIGNIFICANT CHANGE UP (ref 0.5–1.3)
GLUCOSE SERPL-MCNC: 121 MG/DL — HIGH (ref 70–99)
HCT VFR BLD CALC: 41.3 % — SIGNIFICANT CHANGE UP (ref 39–50)
HCV AB S/CO SERPL IA: 0.07 S/CO — SIGNIFICANT CHANGE UP (ref 0–0.99)
HCV AB SERPL-IMP: SIGNIFICANT CHANGE UP
HGB BLD-MCNC: 13.2 G/DL — SIGNIFICANT CHANGE UP (ref 13–17)
MAGNESIUM SERPL-MCNC: 2.4 MG/DL — SIGNIFICANT CHANGE UP (ref 1.6–2.6)
MCHC RBC-ENTMCNC: 26 PG — LOW (ref 27–34)
MCHC RBC-ENTMCNC: 32 GM/DL — SIGNIFICANT CHANGE UP (ref 32–36)
MCV RBC AUTO: 81.5 FL — SIGNIFICANT CHANGE UP (ref 80–100)
NRBC # BLD: 0 /100 WBCS — SIGNIFICANT CHANGE UP (ref 0–0)
PHOSPHATE SERPL-MCNC: 3.4 MG/DL — SIGNIFICANT CHANGE UP (ref 2.5–4.5)
PLATELET # BLD AUTO: 505 K/UL — HIGH (ref 150–400)
POTASSIUM SERPL-MCNC: 4.6 MMOL/L — SIGNIFICANT CHANGE UP (ref 3.5–5.3)
POTASSIUM SERPL-SCNC: 4.6 MMOL/L — SIGNIFICANT CHANGE UP (ref 3.5–5.3)
PROT SERPL-MCNC: 7.6 G/DL — SIGNIFICANT CHANGE UP (ref 6–8.3)
RBC # BLD: 5.07 M/UL — SIGNIFICANT CHANGE UP (ref 4.2–5.8)
RBC # FLD: 12.4 % — SIGNIFICANT CHANGE UP (ref 10.3–14.5)
SODIUM SERPL-SCNC: 139 MMOL/L — SIGNIFICANT CHANGE UP (ref 135–145)
WBC # BLD: 10.72 K/UL — HIGH (ref 3.8–10.5)
WBC # FLD AUTO: 10.72 K/UL — HIGH (ref 3.8–10.5)

## 2020-06-07 PROCEDURE — 99232 SBSQ HOSP IP/OBS MODERATE 35: CPT | Mod: GC

## 2020-06-07 RX ORDER — LANOLIN ALCOHOL/MO/W.PET/CERES
5 CREAM (GRAM) TOPICAL AT BEDTIME
Refills: 0 | Status: DISCONTINUED | OUTPATIENT
Start: 2020-06-07 | End: 2020-06-13

## 2020-06-07 RX ADMIN — BISOPROLOL FUMARATE 5 MILLIGRAM(S): 10 TABLET, FILM COATED ORAL at 05:37

## 2020-06-07 RX ADMIN — LISINOPRIL 10 MILLIGRAM(S): 2.5 TABLET ORAL at 05:36

## 2020-06-07 RX ADMIN — Medication 5 MILLIGRAM(S): at 21:11

## 2020-06-07 RX ADMIN — ENOXAPARIN SODIUM 40 MILLIGRAM(S): 100 INJECTION SUBCUTANEOUS at 11:40

## 2020-06-07 RX ADMIN — Medication 650 MILLIGRAM(S): at 05:30

## 2020-06-07 RX ADMIN — Medication 650 MILLIGRAM(S): at 00:04

## 2020-06-07 RX ADMIN — CLOPIDOGREL BISULFATE 75 MILLIGRAM(S): 75 TABLET, FILM COATED ORAL at 11:40

## 2020-06-07 RX ADMIN — Medication 650 MILLIGRAM(S): at 11:40

## 2020-06-07 RX ADMIN — Medication 40 MILLIGRAM(S): at 05:35

## 2020-06-07 NOTE — PHYSICAL THERAPY INITIAL EVALUATION ADULT - ADDITIONAL COMMENTS
Pt is Nauruan speaking, As per the pt, he lives alone in an appt on 4th floor, +elevator, PTA, pt was Independent for Adls/functional ambulation. Pt utilizes RW for mobility. Pt is Rwandan speaking, As per the pt, he lives alone in an appt on 4th floor, +elevator, PTA, pt was Independent for Adls/functional ambulation. Pt utilizes RW for mobility. Pt also stated, his daughter who lives in the same building comes and assist him for cooking and cleaning as needed.

## 2020-06-07 NOTE — DISCHARGE NOTE PROVIDER - NSDCMRMEDTOKEN_GEN_ALL_CORE_FT
bisoprolol 5 mg oral tablet: 1 tab(s) orally once a day  clopidogrel 75 mg oral tablet: 1 tab(s) orally once a day  colchicine 0.5 mg oral tablet: 1 tab(s) orally once a day  ramipril 2.5 mg oral capsule: 1 cap(s) orally once a day bisoprolol 5 mg oral tablet: 1 tab(s) orally once a day  clopidogrel 75 mg oral tablet: 1 tab(s) orally once a day  colchicine 0.5 mg oral tablet: 1 tab(s) orally once a day  predniSONE 10 mg oral tablet: 3 tab(s) orally once a day   predniSONE 10 mg oral tablet: 1 tab(s) orally once a day   predniSONE 20 mg oral tablet: 1 tab(s) orally once a day   predniSONE 5 mg oral tablet: 3 tab(s) orally once a day   predniSONE 5 mg oral tablet: 1 tab(s) orally once a day   ramipril 2.5 mg oral capsule: 1 cap(s) orally once a day bisoprolol 5 mg oral tablet: 1 tab(s) orally once a day  clopidogrel 75 mg oral tablet: 1 tab(s) orally once a day  colchicine 0.6 mg oral tablet: 1 tab(s) orally once a day  ramipril 2.5 mg oral capsule: 1 cap(s) orally once a day

## 2020-06-07 NOTE — PHYSICAL THERAPY INITIAL EVALUATION ADULT - DISCHARGE DISPOSITION, PT EVAL
home w/assist of family and home PT for safety assessment and to improve his strength, balance and gait functions for safe return to his PLOF. Pt has RW at home/home w/ home PT

## 2020-06-07 NOTE — PROGRESS NOTE ADULT - SUBJECTIVE AND OBJECTIVE BOX
[facility] Division of Internal Medicine  Valery Leonard, PGY-1  Pager: [facility]: [phone] | [facility]: [phone]    Patient is a 69y old  Male who presents with a chief complaint of gout flare (06 Jun 2020 08:46)      SUBJECTIVE / OVERNIGHT EVENTS: No acute events overnight   ADDITIONAL REVIEW OF SYSTEMS: +R knee pain, no nausea/vomiting/diarrhea/abd pain/fever/chills     12 point ROS negative except as above     MEDICATIONS  (STANDING):  bisoprolol   Tablet 5 milliGRAM(s) Oral daily  clopidogrel Tablet 75 milliGRAM(s) Oral daily  enoxaparin Injectable 40 milliGRAM(s) SubCutaneous daily  lisinopril 10 milliGRAM(s) Oral daily  predniSONE   Tablet 40 milliGRAM(s) Oral daily    MEDICATIONS  (PRN):  acetaminophen   Tablet .. 650 milliGRAM(s) Oral every 6 hours PRN Temp greater or equal to 38C (100.4F), Mild Pain (1 - 3), Moderate Pain (4 - 6), Severe Pain (7 - 10)      CAPILLARY BLOOD GLUCOSE        I&O's Summary    06 Jun 2020 07:01  -  07 Jun 2020 07:00  --------------------------------------------------------  IN: 0 mL / OUT: 700 mL / NET: -700 mL        PHYSICAL EXAM:  Vital Signs Last 24 Hrs  T(C): 36.4 (07 Jun 2020 04:32), Max: 37 (06 Jun 2020 21:31)  T(F): 97.6 (07 Jun 2020 04:32), Max: 98.6 (06 Jun 2020 21:31)  HR: 83 (07 Jun 2020 04:32) (62 - 90)  BP: 138/77 (07 Jun 2020 04:32) (129/81 - 144/79)  BP(mean): --  RR: 18 (07 Jun 2020 04:32) (18 - 20)  SpO2: 95% (07 Jun 2020 04:32) (94% - 97%)      GENERAL: NAD, well-groomed, well-developed  	HEAD:  Atraumatic, Normocephalic  	EYES: EOMI, conjunctiva and sclera clear  	ENMT: No tonsillar erythema, exudates, or enlargement; Moist mucous membranes, Good dentition, No lesions  	NECK: Supple, No JVD, Normal thyroid  	CHEST/LUNG: Clear to percussion bilaterally; No rales, rhonchi, wheezing, or rubs no resp distress or acc. muscle usage  	HEART: Regular rate and rhythm; No murmurs, rubs, or gallops, no sig Le edema  	ABDOMEN: Soft, Nontender, Nondistended; Bowel sounds present, no rebound/guarding  	EXTREMITIES:  2+ Peripheral Pulses, No clubbing, cyanosis. +R knee mild warmth vs left, no redness. MIld TTP over lateral aspect and medial joint. Dec ROM with passive/active flexion/extension of R knee limited by pain. NO effusion felt.   	LYMPH: No lymphadenopathy noted, no lymphangitis  	SKIN: No rashes or lesions  	NERVOUS SYSTEM:  Alert & Oriented X3, Good concentration; Motor Strength 5/5 B/L upper and lower extremities. no facial droop no dysarthria.   PSYCH: normal affect no si no hi    LABS:                        13.2   10.72 )-----------( 505      ( 07 Jun 2020 08:48 )             41.3     06-06    139  |  105  |  26<H>  ----------------------------<  99  4.7   |  21<L>  |  1.03    Ca    9.4      06 Jun 2020 08:39  Phos  4.1     06-06  Mg     2.2     06-06    TPro  7.1  /  Alb  3.8  /  TBili  0.6  /  DBili  x   /  AST  13  /  ALT  21  /  AlkPhos  74  06-06                RADIOLOGY & ADDITIONAL TESTS:  Results Reviewed:   Imaging Personally Reviewed:  Electrocardiogram Personally Reviewed:    COORDINATION OF CARE:  Care Discussed with Consultants/Other Providers [Y/N]:  Prior or Outpatient Records Reviewed [Y/N]: [facility] Division of Internal Medicine  Valery Tolentinoidi, PGY-1  Pager: [facility]: [phone] | [facility]: [phone]    Patient is a 69y old  Male who presents with a chief complaint of gout flare (06 Jun 2020 08:46)      SUBJECTIVE / OVERNIGHT EVENTS: No acute events overnight   ADDITIONAL REVIEW OF SYSTEMS: +R knee pain, +R shin numbness, +R leg heaviness when walking, no nausea/vomiting/diarrhea/abd pain/fever/chills     12 point ROS negative except as above     MEDICATIONS  (STANDING):  bisoprolol   Tablet 5 milliGRAM(s) Oral daily  clopidogrel Tablet 75 milliGRAM(s) Oral daily  enoxaparin Injectable 40 milliGRAM(s) SubCutaneous daily  lisinopril 10 milliGRAM(s) Oral daily  predniSONE   Tablet 40 milliGRAM(s) Oral daily    MEDICATIONS  (PRN):  acetaminophen   Tablet .. 650 milliGRAM(s) Oral every 6 hours PRN Temp greater or equal to 38C (100.4F), Mild Pain (1 - 3), Moderate Pain (4 - 6), Severe Pain (7 - 10)      CAPILLARY BLOOD GLUCOSE        I&O's Summary    06 Jun 2020 07:01  -  07 Jun 2020 07:00  --------------------------------------------------------  IN: 0 mL / OUT: 700 mL / NET: -700 mL        PHYSICAL EXAM:  Vital Signs Last 24 Hrs  T(C): 36.4 (07 Jun 2020 04:32), Max: 37 (06 Jun 2020 21:31)  T(F): 97.6 (07 Jun 2020 04:32), Max: 98.6 (06 Jun 2020 21:31)  HR: 83 (07 Jun 2020 04:32) (62 - 90)  BP: 138/77 (07 Jun 2020 04:32) (129/81 - 144/79)  BP(mean): --  RR: 18 (07 Jun 2020 04:32) (18 - 20)  SpO2: 95% (07 Jun 2020 04:32) (94% - 97%)      GENERAL: NAD, well-groomed, well-developed  	HEAD:  Atraumatic, Normocephalic  	EYES: EOMI, conjunctiva and sclera clear  	ENMT: No tonsillar erythema, exudates, or enlargement; Moist mucous membranes, Good dentition, No lesions  	NECK: Supple, No JVD, Normal thyroid  	CHEST/LUNG: Clear to percussion bilaterally; No rales, rhonchi, wheezing, or rubs no resp distress or acc. muscle usage  	HEART: Regular rate and rhythm; No murmurs, rubs, or gallops, no sig Le edema  	ABDOMEN: Soft, Nontender, Nondistended; Bowel sounds present, no rebound/guarding  	EXTREMITIES:  2+ Peripheral Pulses, No clubbing, cyanosis. +R knee mild warmth vs left, no redness. MIld TTP over lateral aspect and medial joint. Dec ROM with passive/active flexion/extension of R knee limited by pain. NO effusion felt. No TTP on b/l shins, but decreased sensation on lateral aspect of R shin.   	LYMPH: No lymphadenopathy noted, no lymphangitis  	SKIN: No rashes or lesions  	NERVOUS SYSTEM:  Alert & Oriented X3, Good concentration; Motor Strength 5/5 B/L upper and lower extremities. no facial droop no dysarthria.   PSYCH: normal affect    LABS:                        13.2   10.72 )-----------( 505      ( 07 Jun 2020 08:48 )             41.3     06-06    139  |  105  |  26<H>  ----------------------------<  99  4.7   |  21<L>  |  1.03    Ca    9.4      06 Jun 2020 08:39  Phos  4.1     06-06  Mg     2.2     06-06    TPro  7.1  /  Alb  3.8  /  TBili  0.6  /  DBili  x   /  AST  13  /  ALT  21  /  AlkPhos  74  06-06                RADIOLOGY & ADDITIONAL TESTS:  Results Reviewed:   Imaging Personally Reviewed:  Electrocardiogram Personally Reviewed:    COORDINATION OF CARE:  Care Discussed with Consultants/Other Providers [Y/N]:  Prior or Outpatient Records Reviewed [Y/N]: [facility] Division of Internal Medicine  Valery Leonard, PGY-1  Pager: [facility]: [phone] | [facility]: [phone]    Patient is a 69y old  Male who presents with a chief complaint of gout flare (06 Jun 2020 08:46)    Family translated in [place].   SUBJECTIVE / OVERNIGHT EVENTS: No acute events overnight   ADDITIONAL REVIEW OF SYSTEMS: +R knee pain similar to prior days. No nausea/vomiting/diarrhea/abd pain/fever/chills     12 point ROS negative except as above     MEDICATIONS  (STANDING):  bisoprolol   Tablet 5 milliGRAM(s) Oral daily  clopidogrel Tablet 75 milliGRAM(s) Oral daily  enoxaparin Injectable 40 milliGRAM(s) SubCutaneous daily  lisinopril 10 milliGRAM(s) Oral daily  predniSONE   Tablet 40 milliGRAM(s) Oral daily    MEDICATIONS  (PRN):  acetaminophen   Tablet .. 650 milliGRAM(s) Oral every 6 hours PRN Temp greater or equal to 38C (100.4F), Mild Pain (1 - 3), Moderate Pain (4 - 6), Severe Pain (7 - 10)      CAPILLARY BLOOD GLUCOSE        I&O's Summary    06 Jun 2020 07:01  -  07 Jun 2020 07:00  --------------------------------------------------------  IN: 0 mL / OUT: 700 mL / NET: -700 mL        PHYSICAL EXAM:  Vital Signs Last 24 Hrs  T(C): 36.4 (07 Jun 2020 04:32), Max: 37 (06 Jun 2020 21:31)  T(F): 97.6 (07 Jun 2020 04:32), Max: 98.6 (06 Jun 2020 21:31)  HR: 83 (07 Jun 2020 04:32) (62 - 90)  BP: 138/77 (07 Jun 2020 04:32) (129/81 - 144/79)  BP(mean): --  RR: 18 (07 Jun 2020 04:32) (18 - 20)  SpO2: 95% (07 Jun 2020 04:32) (94% - 97%)      GENERAL: NAD, well-groomed, well-developed  	HEAD:  Atraumatic, Normocephalic  	EYES: EOMI, conjunctiva and sclera clear  	ENMT: No tonsillar erythema, exudates, or enlargement; Moist mucous membranes, Good dentition, No lesions  	NECK: Supple, No JVD, Normal thyroid  	CHEST/LUNG: Clear to percussion bilaterally; No rales, rhonchi, wheezing, or rubs no resp distress or acc. muscle usage  	HEART: Regular rate and rhythm; No murmurs, rubs, or gallops, no sig Le edema  	ABDOMEN: Soft, Nontender, Nondistended; Bowel sounds present, no rebound/guarding  	EXTREMITIES:  2+ Peripheral Pulses, No clubbing, cyanosis. +R knee mild warmth vs left, no redness. MIld TTP over lateral aspect and medial joint. Dec ROM with passive/active flexion/extension of R knee limited by pain. NO effusion felt. No TTP on b/l shins   	LYMPH: No lymphadenopathy noted, no lymphangitis  	SKIN: No rashes or lesions  	NERVOUS SYSTEM:  Alert. Moving all ext. no facial droop no dysarthria.   PSYCH: normal affect    LABS:                        13.2   10.72 )-----------( 505      ( 07 Jun 2020 08:48 )             41.3     06-06    139  |  105  |  26<H>  ----------------------------<  99  4.7   |  21<L>  |  1.03    Ca    9.4      06 Jun 2020 08:39  Phos  4.1     06-06  Mg     2.2     06-06    TPro  7.1  /  Alb  3.8  /  TBili  0.6  /  DBili  x   /  AST  13  /  ALT  21  /  AlkPhos  74  06-06                RADIOLOGY & ADDITIONAL TESTS:  Results Reviewed:   Imaging Personally Reviewed:  Electrocardiogram Personally Reviewed:    COORDINATION OF CARE:  Care Discussed with Consultants/Other Providers [Y/N]:  Prior or Outpatient Records Reviewed [Y/N]:

## 2020-06-07 NOTE — PHYSICAL THERAPY INITIAL EVALUATION ADULT - PERTINENT HX OF CURRENT PROBLEM, REHAB EVAL
69 M PMH CAD s/p stents, HTN, Gout, p/w R knee pain. Call placed to daughter Lisa for collateral.  Pt has had 2-3 weeks of progressive R knee pain. +mild swelling +pain worse with movement. Pt states he has had similar sx in past that have been dx as gout flares, and medically managed.  No prior tap of knee effusion. Rx on prophylactic colchicine which he states he is compliant with. Right knee xray; No evidence of acute fracture or dislocation, Small right knee joint effusion

## 2020-06-07 NOTE — DISCHARGE NOTE PROVIDER - HOSPITAL COURSE
In brief, this is a pleasant 69 year old Male PMHx CAD s/p stents, HTN, and Gout here with right knee arthropathy likely gout flair. No response to nsaids. Prednisone 40 mg po daily followed by taper after response. If does not improve, may need rheum for intraarticular steroid injection. Low suspicion for septic joint. After flair resolution, will need to be on allopurinol given elevated serum uric acid.     Dispo to home with home pt pending control of pain. Anticipate d/c in 1-2 days. In brief, this is a pleasant 69 year old Male PMHx CAD s/p stents, HTN, and Gout here with right knee arthropathy likely gout flare in the setting of elevated serum uric acid; low suspicion for septic arthritis. Patient's pain and swelling did not respond to NSAIDs and he was initially started on PO Prednisone 40mg once a day with minimal improvement. We subsequently consulted rheumatology and started the patient on IV solumedrol 40mg once daily and was restarted on home dose of Colchicine 0.6mg once daily. After 2 days of IV steroids, patient's knee pain and swelling improved significantly and he was switched a to PO Prednisone taper. Patient was evaluated by PT who recommended discharge to home with home PT.        Patient is currently ready for discharge; he has been afebrile and has had stable VS. He will be discharged home to complete his Prednisone taper, to be taken as prescribed. As patient does not have health insurance, medications were sent to [facility]; patient has agreed to pay fee of $11.29 for his medications. Patient instructed to continue taking Colchicine following discharge. Of note, patient's request for home PT was denied because he does not follow with a PMD. Patient's contact information was provided to the [facility] and the Outpatient [facility]umatology practice at [address] [place] [address], in order to set up a follow up appointment. Patient instructed to follow up with both services within 1 week of discharge. He was informed he can request home PT once he establishes care with a PMD. In brief, this is a pleasant 69 year old Male PMHx CAD s/p stents, HTN, and Gout here with right knee arthropathy likely gout flare in the setting of elevated serum uric acid; low suspicion for septic arthritis. Patient's pain and swelling did not respond to NSAIDs and he was initially started on PO Prednisone 40mg once a day with minimal improvement. We subsequently consulted rheumatology and started the patient on IV solumedrol 40mg once daily and was restarted on home dose of Colchicine 0.6mg once daily. After 2 days of IV steroids, patient's knee pain and swelling improved significantly and he was switched a to PO Prednisone taper. Pt complained of palpitations at times, workup done inpatient was nondiagnostic and no signs of acute ischemia, recommended outpt follow-up.  Patient was evaluated by PT who recommended discharge to home with home PT.        Patient is currently ready for discharge; he has been afebrile and has had stable VS. He will be discharged home to complete his Prednisone taper, to be taken as prescribed. As patient does not have health insurance, medications were sent to [facility]; patient has agreed to pay fee of $11.29 for his medications. Patient instructed to continue taking Colchicine following discharge. Of note, patient's request for home PT was denied because he does not follow with a PMD. Patient's contact information was provided to the [facility] and the Outpatient Rheumatology practice at [address] [place] [address], in order to set up a follow up appointment. Patient instructed to follow up with both services within 1 week of discharge. He was informed he can request home PT once he establishes care with a PMD.

## 2020-06-07 NOTE — PROGRESS NOTE ADULT - ATTENDING COMMENTS
Patient seen and examined today. I agree with the above findings, assessment, and plan with the following additions and exceptions:     In brief, this is a pleasant 69 year old Male PMHx CAD s/p stents, HTN, and Gout here with right knee arthropathy likely gout flair. No response to nsaids. Prednisone 40 mg po daily followed by taper after response. If does not improve, may need rheum for intraarticular steroid injection. Low suspicion for septic joint. After flair resolution, will need to be on allopurinol given elevated serum uric acid.   Dispo to home with home pt pending control of pain. Anticipate d/c in 1-2 days.     Rest of plan as above    Dr. Wei Aguilar DO  Attending Physician  Division of Hospital Medicine  [facility]  Pager:  [phone]

## 2020-06-07 NOTE — DISCHARGE NOTE PROVIDER - CARE PROVIDER_API CALL
PMD,   [place] [address]  Phone: [phone]  Fax: (   )    -  Follow Up Time: PMD,   Internal Medicine at the [facility]  [address] [place] [address]  Phone: [phone]  Fax: (   )    -  Follow Up Time:     Rheumatology,   [facility] at [place] [id number] [facility], [address] [address]  Phone: [phone]  Fax: (   )    -  Follow Up Time:

## 2020-06-07 NOTE — DISCHARGE NOTE PROVIDER - NSDCFUSCHEDAPPT_GEN_ALL_CORE_FT
GANESH GANDHI ; 06/15/2020 ; NPP Med Int [address] [facility] GANESH GANDHI ; 06/15/2020 ; NPP Med Int [address] [facility] GANESH GANDHI ; 06/15/2020 ; NPP Med Int [address] [facility]

## 2020-06-07 NOTE — PROGRESS NOTE ADULT - PROBLEM SELECTOR PLAN 1
-recurrent gout flare; overall presentation without fever/leukocytosis, no significant effusion on exam, and imaging without significant inflammatory change or large effusion  -s/p toradol in ER. No significant improvement w/naproxen  -started prednisone 40 mg daily  -elevated uric acid level  -PT eval, needed PT in prior admissions  -fall prec -recurrent gout flare; overall presentation without fever/leukocytosis, no significant effusion on exam, and imaging without significant inflammatory change or large effusion  -s/p toradol in ER. No significant improvement w/naproxen  -c/w prednisone 40 mg daily  -elevated uric acid level  -PT rec home PT   -fall prec

## 2020-06-07 NOTE — DISCHARGE NOTE PROVIDER - NSDCCPCAREPLAN_GEN_ALL_CORE_FT
PRINCIPAL DISCHARGE DIAGNOSIS  Diagnosis: Gout flare  Assessment and Plan of Treatment: You were admitted for an acute gout flare of your right knee, which was causing you significant pain. You were evaluated by our rheumatologists and was started on IV steroids and Colchicine 0.6mg (which you were already taking at home). After several days of receiving IV steroids, your knee pain significantly improved and you were switched to an oral medication, called Prednisone. You will be discharged with a prescription of this medication. Please carefully follow the instructions on the bottle and take it, as prescribed. You will be taking 4 tablets once daily from 6/14-6/16, then 3 tablets once daily from 6/17-6/19, then 2 tablets once daily from 6/20-6/22, and finally, 1 tablet once daily from 6/23-6/25. After being discharged, please follow up with a PMD and rheumatologist within 1 week of discharge. We have given your daughter's (Lisa's) contact information to the doctors' office and you should expect a phone call to schedule an appointment soon. If by Monday afternoon, you have not heard from the internal medicine or the rheumatology office, please give their office a call. Their contact information will be included with your discharge paperwork.      SECONDARY DISCHARGE DIAGNOSES  Diagnosis: Physical deconditioning  Assessment and Plan of Treatment: After experiencing a gout flare and being in the hospital for several days, our physical therapists have determined that you would benefit from physical therapy sessions at home. We attempted to set up home care services for you upon discharge, but unfortunately, because you did not have a PMD, we were unableanything set up. Please follow up with a PMD within 1 week of discharge and the providers at your PMD's office can assist with physical therapy once you have established care. PRINCIPAL DISCHARGE DIAGNOSIS  Diagnosis: Gout flare  Assessment and Plan of Treatment: You were admitted for an acute gout flare of your right knee, which was causing you significant pain. You were evaluated by our rheumatologists and was started on IV steroids and Colchicine 0.6mg (which you were already taking at home however your dose at home was 0.5). After several days of receiving IV steroids, your knee pain significantly improved and you were switched to an oral medication, called Prednisone. You will be discharged with a prescription of this medication. Please carefully follow the instructions on the bottle and take it, as prescribed. You will be taking 4 tablets once daily from 6/14-6/16, then 3 tablets once daily from 6/17-6/19, then 2 tablets once daily from 6/20-6/22, and finally, 1 tablet once daily from 6/23-6/25. After being discharged, please follow up with a PMD and rheumatologist within 1 week of discharge. We have given your daughter's (Lsia's) contact information to the doctors' office and you should expect a phone call to schedule an appointment soon. If by Monday afternoon, you have not heard from the internal medicine or the rheumatology office, please give their office a call. Their contact information will be included with your discharge paperwork.      SECONDARY DISCHARGE DIAGNOSES  Diagnosis: Palpitations  Assessment and Plan of Treatment: You complained of palpiations on occasion.  Workup was negative inpatient but you should follow up with a primiary care doctor within 1 week of discharge as they can refer you to a cardiologist for further evaluation.    Diagnosis: Physical deconditioning  Assessment and Plan of Treatment: After experiencing a gout flare and being in the hospital for several days, our physical therapists have determined that you would benefit from physical therapy sessions at home. We attempted to set up home care services for you upon discharge, but unfortunately, because you did not have a PMD, we were unableanything set up. Please follow up with a PMD within 1 week of discharge and the providers at your PMD's office can assist with physical therapy once you have established care.

## 2020-06-07 NOTE — PHYSICAL THERAPY INITIAL EVALUATION ADULT - STRENGTHENING, PT EVAL
Pt will demonstrate good strength in BLE/BUEs to improve limb stability during Adls/mobility in 2weeks

## 2020-06-07 NOTE — DISCHARGE NOTE PROVIDER - PROVIDER TOKENS
FREE:[LAST:[PMD],PHONE:[phone]],FAX:[(   )    -],ADDRESS:[[place] [address]]] FREE:[LAST:[PMD],PHONE:[phone]],FAX:[(   )    -],ADDRESS:[[place] [address]]],FREE:[LAST:[Rheumatology],PHONE:[phone]],FAX:[(   )    -],ADDRESS:[[facility] at [address], [place] [address]]]

## 2020-06-08 LAB
ALBUMIN SERPL ELPH-MCNC: 4.5 G/DL — SIGNIFICANT CHANGE UP (ref 3.3–5)
ALP SERPL-CCNC: 73 U/L — SIGNIFICANT CHANGE UP (ref 40–120)
ALT FLD-CCNC: 30 U/L — SIGNIFICANT CHANGE UP (ref 10–45)
ANION GAP SERPL CALC-SCNC: 14 MMOL/L — SIGNIFICANT CHANGE UP (ref 5–17)
AST SERPL-CCNC: 19 U/L — SIGNIFICANT CHANGE UP (ref 10–40)
BILIRUB SERPL-MCNC: 0.8 MG/DL — SIGNIFICANT CHANGE UP (ref 0.2–1.2)
BUN SERPL-MCNC: 25 MG/DL — HIGH (ref 7–23)
CALCIUM SERPL-MCNC: 10 MG/DL — SIGNIFICANT CHANGE UP (ref 8.4–10.5)
CHLORIDE SERPL-SCNC: 101 MMOL/L — SIGNIFICANT CHANGE UP (ref 96–108)
CO2 SERPL-SCNC: 23 MMOL/L — SIGNIFICANT CHANGE UP (ref 22–31)
CREAT SERPL-MCNC: 0.99 MG/DL — SIGNIFICANT CHANGE UP (ref 0.5–1.3)
GLUCOSE SERPL-MCNC: 140 MG/DL — HIGH (ref 70–99)
HCT VFR BLD CALC: 45.1 % — SIGNIFICANT CHANGE UP (ref 39–50)
HGB BLD-MCNC: 14.1 G/DL — SIGNIFICANT CHANGE UP (ref 13–17)
MAGNESIUM SERPL-MCNC: 2.4 MG/DL — SIGNIFICANT CHANGE UP (ref 1.6–2.6)
MCHC RBC-ENTMCNC: 25.8 PG — LOW (ref 27–34)
MCHC RBC-ENTMCNC: 31.3 GM/DL — LOW (ref 32–36)
MCV RBC AUTO: 82.6 FL — SIGNIFICANT CHANGE UP (ref 80–100)
NRBC # BLD: 0 /100 WBCS — SIGNIFICANT CHANGE UP (ref 0–0)
PHOSPHATE SERPL-MCNC: 3.2 MG/DL — SIGNIFICANT CHANGE UP (ref 2.5–4.5)
PLATELET # BLD AUTO: 554 K/UL — HIGH (ref 150–400)
POTASSIUM SERPL-MCNC: 4.5 MMOL/L — SIGNIFICANT CHANGE UP (ref 3.5–5.3)
POTASSIUM SERPL-SCNC: 4.5 MMOL/L — SIGNIFICANT CHANGE UP (ref 3.5–5.3)
PROT SERPL-MCNC: 7.9 G/DL — SIGNIFICANT CHANGE UP (ref 6–8.3)
RBC # BLD: 5.46 M/UL — SIGNIFICANT CHANGE UP (ref 4.2–5.8)
RBC # FLD: 12.9 % — SIGNIFICANT CHANGE UP (ref 10.3–14.5)
SODIUM SERPL-SCNC: 138 MMOL/L — SIGNIFICANT CHANGE UP (ref 135–145)
WBC # BLD: 9.32 K/UL — SIGNIFICANT CHANGE UP (ref 3.8–10.5)
WBC # FLD AUTO: 9.32 K/UL — SIGNIFICANT CHANGE UP (ref 3.8–10.5)

## 2020-06-08 PROCEDURE — 99233 SBSQ HOSP IP/OBS HIGH 50: CPT

## 2020-06-08 PROCEDURE — 99255 IP/OBS CONSLTJ NEW/EST HI 80: CPT | Mod: GC

## 2020-06-08 RX ADMIN — BISOPROLOL FUMARATE 5 MILLIGRAM(S): 10 TABLET, FILM COATED ORAL at 05:08

## 2020-06-08 RX ADMIN — Medication 5 MILLIGRAM(S): at 21:43

## 2020-06-08 RX ADMIN — Medication 650 MILLIGRAM(S): at 10:52

## 2020-06-08 RX ADMIN — CLOPIDOGREL BISULFATE 75 MILLIGRAM(S): 75 TABLET, FILM COATED ORAL at 10:52

## 2020-06-08 RX ADMIN — LISINOPRIL 10 MILLIGRAM(S): 2.5 TABLET ORAL at 05:08

## 2020-06-08 RX ADMIN — ENOXAPARIN SODIUM 40 MILLIGRAM(S): 100 INJECTION SUBCUTANEOUS at 10:52

## 2020-06-08 RX ADMIN — Medication 40 MILLIGRAM(S): at 05:08

## 2020-06-08 NOTE — CONSULT NOTE ADULT - ATTENDING COMMENTS
Pt seen/examined personally. + hx of gout, presently with polyarticular flare in setting of elevated sUA and tophi. Steroids as above for acute flare, c/w colchicine and will need ULT initiation once this flare is resolved.

## 2020-06-08 NOTE — PROGRESS NOTE ADULT - SUBJECTIVE AND OBJECTIVE BOX
Edward Muro MD  Internal Medicine, PGY-1  Beeper: [phone] ([facility])/ [id number] ([facility])     Subjective:    Patient is a 69y old  Male who presents with a chief complaint of gout flare (07 Jun 2020 17:07)    No acute overnight events.    Patient was seen and examined at bedside this AM. Denied fever, chills, nausea, vomiting, CP, palpitations, coughing, wheezing, SOB, and abdominal pain.      MEDICATIONS  (STANDING):  bisoprolol   Tablet 5 milliGRAM(s) Oral daily  clopidogrel Tablet 75 milliGRAM(s) Oral daily  enoxaparin Injectable 40 milliGRAM(s) SubCutaneous daily  lisinopril 10 milliGRAM(s) Oral daily  melatonin 5 milliGRAM(s) Oral at bedtime  predniSONE   Tablet 40 milliGRAM(s) Oral daily    MEDICATIONS  (PRN):  acetaminophen   Tablet .. 650 milliGRAM(s) Oral every 6 hours PRN Temp greater or equal to 38C (100.4F), Mild Pain (1 - 3), Moderate Pain (4 - 6), Severe Pain (7 - 10)      Objective:    Vitals: Vital Signs Last 24 Hrs  T(C): 36.8 (06-08-20 @ 05:00), Max: 36.8 (06-08-20 @ 05:00)  T(F): 98.2 (06-08-20 @ 05:00), Max: 98.2 (06-08-20 @ 05:00)  HR: 63 (06-08-20 @ 05:00) (62 - 66)  BP: 101/63 (06-08-20 @ 05:00) (101/63 - 151/93)  BP(mean): --  RR: 18 (06-08-20 @ 05:00) (18 - 18)  SpO2: 97% (06-08-20 @ 05:00) (96% - 97%)              I&O's Summary    PHYSICAL EXAM:  GENERAL: NAD, well-groomed, well-developed  HEAD:  Atraumatic, Normocephalic  EYES: PERRLA, conjunctiva and sclera clear  ENMT: No tonsillar erythema, exudates, or enlargement; Moist mucous membranes, Good dentition, No lesions  NECK: Supple, No JVD, Normal thyroid  CHEST/LUNG: Clear to auscultation bilaterally; No rales, rhonchi, wheezing, or rubs  HEART: Regular rate and rhythm; No murmurs, rubs, or gallops  ABDOMEN: Soft, Nontender, Nondistended; Bowel sounds present  EXTREMITIES:  2+ Peripheral Pulses, No clubbing, cyanosis, or edema  LYMPH: No lymphadenopathy noted  SKIN: No rashes or lesions noted  NERVOUS SYSTEM:  Alert & Oriented X3, Good concentration; Motor Strength 5/5 B/L upper and lower extremities; DTRs 2+ intact and symmetric                                               LABS:  06-07    139  |  104  |  23  ----------------------------<  121<H>  4.6   |  23  |  0.96  06-06    139  |  105  |  26<H>  ----------------------------<  99  4.7   |  21<L>  |  1.03  06-06    139  |  105  |  28<H>  ----------------------------<  104<H>  4.8   |  22  |  1.23    Ca    9.8      07 Jun 2020 08:48  Ca    9.4      06 Jun 2020 08:39  Ca    9.1      06 Jun 2020 02:29  Phos  3.4     06-07  Mg     2.4     06-07    TPro  7.6  /  Alb  4.2  /  TBili  0.6  /  DBili  x   /  AST  12  /  ALT  22  /  AlkPhos  77  06-07  TPro  7.1  /  Alb  3.8  /  TBili  0.6  /  DBili  x   /  AST  13  /  ALT  21  /  AlkPhos  74  06-06  TPro  7.1  /  Alb  3.6  /  TBili  0.5  /  DBili  x   /  AST  26  /  ALT  24  /  AlkPhos  72  06-06                                                    13.2   10.72 )-----------( 505      ( 07 Jun 2020 08:48 )             41.3                         13.0   8.04  )-----------( 464      ( 06 Jun 2020 08:39 )             40.8                         12.7   8.81  )-----------( 451      ( 06 Jun 2020 01:14 )             39.1     CAPILLARY BLOOD GLUCOSE            RECENT CULTURES:      TELEMETRY:    ECG:    TTE:    RADIOLOGY & ADDITIONAL TESTS:    Imaging Personally Reviewed:  [ ] YES  [ ] NO    Consultants involved in case:   Consultant(s) Notes Reviewed:  [ ] YES  [ ] NO:   Care Discussed with Consultants/Other Providers [ ] YES  [ ] NO Edward Muro MD  Internal Medicine, PGY-1  Beeper: [phone] ([facility])/ [phone] ([facility])     Subjective:    Patient is a 69y old  Male who presents with a chief complaint of gout flare (07 Jun 2020 17:07)    No acute overnight events.    Patient was seen and examined at bedside this AM. [place]-speaking male; Patricia jackson, provided translation over the phone. Patient still with pain in R knee, perhaps mildly improved, compared to admission. Denied fever, chills, nausea, vomiting, CP, palpitations, coughing, wheezing, SOB, and abdominal pain.      MEDICATIONS  (STANDING):  bisoprolol   Tablet 5 milliGRAM(s) Oral daily  clopidogrel Tablet 75 milliGRAM(s) Oral daily  enoxaparin Injectable 40 milliGRAM(s) SubCutaneous daily  lisinopril 10 milliGRAM(s) Oral daily  melatonin 5 milliGRAM(s) Oral at bedtime  predniSONE   Tablet 40 milliGRAM(s) Oral daily    MEDICATIONS  (PRN):  acetaminophen   Tablet .. 650 milliGRAM(s) Oral every 6 hours PRN Temp greater or equal to 38C (100.4F), Mild Pain (1 - 3), Moderate Pain (4 - 6), Severe Pain (7 - 10)      Objective:    Vitals: Vital Signs Last 24 Hrs  T(C): 36.8 (06-08-20 @ 05:00), Max: 36.8 (06-08-20 @ 05:00)  T(F): 98.2 (06-08-20 @ 05:00), Max: 98.2 (06-08-20 @ 05:00)  HR: 63 (06-08-20 @ 05:00) (62 - 66)  BP: 101/63 (06-08-20 @ 05:00) (101/63 - 151/93)  BP(mean): --  RR: 18 (06-08-20 @ 05:00) (18 - 18)  SpO2: 97% (06-08-20 @ 05:00) (96% - 97%)              I&O's Summary    PHYSICAL EXAM:  GENERAL: NAD, well-groomed, well-developed  HEAD:  Atraumatic, Normocephalic  EYES: PERRLA, conjunctiva and sclera clear  ENMT: No tonsillar erythema, exudates, or enlargement; Moist mucous membranes, Good dentition, No lesions  NECK: Supple, No JVD, Normal thyroid  CHEST/LUNG: Clear to auscultation bilaterally; No rales, rhonchi, wheezing, or rubs  HEART: Regular rate and rhythm; No murmurs, rubs, or gallops  ABDOMEN: Soft, Nontender, Nondistended; Bowel sounds present  EXTREMITIES:  2+ Peripheral Pulses, No clubbing, cyanosis, or edema  LYMPH: No lymphadenopathy noted  SKIN: No rashes or lesions noted  NERVOUS SYSTEM:  Alert & Oriented X3, Good concentration; Motor Strength 5/5 B/L upper and lower extremities; DTRs 2+ intact and symmetric                                               LABS:  06-07    139  |  104  |  23  ----------------------------<  121<H>  4.6   |  23  |  0.96  06-06    139  |  105  |  26<H>  ----------------------------<  99  4.7   |  21<L>  |  1.03  06-06    139  |  105  |  28<H>  ----------------------------<  104<H>  4.8   |  22  |  1.23    Ca    9.8      07 Jun 2020 08:48  Ca    9.4      06 Jun 2020 08:39  Ca    9.1      06 Jun 2020 02:29  Phos  3.4     06-07  Mg     2.4     06-07    TPro  7.6  /  Alb  4.2  /  TBili  0.6  /  DBili  x   /  AST  12  /  ALT  22  /  AlkPhos  77  06-07  TPro  7.1  /  Alb  3.8  /  TBili  0.6  /  DBili  x   /  AST  13  /  ALT  21  /  AlkPhos  74  06-06  TPro  7.1  /  Alb  3.6  /  TBili  0.5  /  DBili  x   /  AST  26  /  ALT  24  /  AlkPhos  72  06-06                                                    13.2   10.72 )-----------( 505      ( 07 Jun 2020 08:48 )             41.3                         13.0   8.04  )-----------( 464      ( 06 Jun 2020 08:39 )             40.8                         12.7   8.81  )-----------( 451      ( 06 Jun 2020 01:14 )             39.1     CAPILLARY BLOOD GLUCOSE            RECENT CULTURES:      TELEMETRY:    ECG:    TTE:    RADIOLOGY & ADDITIONAL TESTS:    Imaging Personally Reviewed:  [ ] YES  [ ] NO    Consultants involved in case:   Consultant(s) Notes Reviewed:  [ ] YES  [ ] NO:   Care Discussed with Consultants/Other Providers [ ] YES  [ ] NO Edward Muro MD  Internal Medicine, PGY-1  Beeper: [phone] ([facility])/ [id number] ([facility])     Subjective:    Patient is a 69y old  Male who presents with a chief complaint of gout flare (07 Jun 2020 17:07)    No acute overnight events.    Patient was seen and examined at bedside this AM. Montenegrin-speaking male; Patricia jackson, provided translation over the phone. Patient still with pain in R knee, perhaps mildly improved, compared to admission. Patient also states that when at home, he occasionally experiences palpitations with diaphoresis, the last episode was 1-1.5 weeks prior to admission; currently denying symptoms. Denied fever, chills, nausea, vomiting, CP, palpitations, coughing, wheezing, SOB, and abdominal pain.      MEDICATIONS  (STANDING):  bisoprolol   Tablet 5 milliGRAM(s) Oral daily  clopidogrel Tablet 75 milliGRAM(s) Oral daily  enoxaparin Injectable 40 milliGRAM(s) SubCutaneous daily  lisinopril 10 milliGRAM(s) Oral daily  melatonin 5 milliGRAM(s) Oral at bedtime  predniSONE   Tablet 40 milliGRAM(s) Oral daily    MEDICATIONS  (PRN):  acetaminophen   Tablet .. 650 milliGRAM(s) Oral every 6 hours PRN Temp greater or equal to 38C (100.4F), Mild Pain (1 - 3), Moderate Pain (4 - 6), Severe Pain (7 - 10)      Objective:    Vitals: Vital Signs Last 24 Hrs  T(C): 36.8 (06-08-20 @ 05:00), Max: 36.8 (06-08-20 @ 05:00)  T(F): 98.2 (06-08-20 @ 05:00), Max: 98.2 (06-08-20 @ 05:00)  HR: 63 (06-08-20 @ 05:00) (62 - 66)  BP: 101/63 (06-08-20 @ 05:00) (101/63 - 151/93)  RR: 18 (06-08-20 @ 05:00) (18 - 18)  SpO2: 97% (06-08-20 @ 05:00) (96% - 97%)                  PHYSICAL EXAM:  GENERAL: elderly male resting comfortably in bed in NAD  HEAD:  Atraumatic, Normocephalic  EYES: conjunctiva and sclera clear  CHEST/LUNG: Clear to auscultation bilaterally; No rales, rhonchi, wheezing, or rubs  HEART: Regular rate and rhythm; No murmurs, rubs, or gallops  ABDOMEN: Soft, Nontender, Nondistended; Bowel sounds present  EXTREMITIES: No LE edema. Both knees warm to the touch; left knee appeared larger than right; evidence of arthritic changes  NERVOUS SYSTEM: awake and alert, conversant                                           LABS:  06-08    138  |  101  |  25<H>  ----------------------------<  140<H>  4.5   |  23  |  0.99    Ca    10.0      08 Jun 2020 09:28  Phos  3.2     06-08  Mg     2.4     06-08    TPro  7.9  /  Alb  4.5  /  TBili  0.8  /  DBili  x   /  AST  19  /  ALT  30  /  AlkPhos  73  06-08                          14.1   9.32  )-----------( 554      ( 08 Jun 2020 09:28 )             45.1

## 2020-06-08 NOTE — CONSULT NOTE ADULT - ASSESSMENT
70yo M with PMHx of Gout, CAD s/p stent, HTN presented to the ED for evaluation of right knee pain. on exam with right knee synovitis and ttp over ankle joints and 1st MTPs b/l. labs with hyperuricemia. Imaging with no major fractures or erosions.       Impression:  Acute polyarticular gout       Recommendations: during the exam with complains of knee right knee pain, ankle pain and 1st MTP b/l.  -Will recommend to start a course of IV steroids   -start solumedrol 40 mg IV daily  -c/w with home Colchicine 0.6 mg daily PO  -will need uric acid lowering therapy 4 weeks after acute attack resolved  -will reassess response to steroids daily        Jayy Saleh  Rheum Fellow I  Pager: [phone]  D/w Dr. Manriquez 68yo M with PMHx of Gout, CAD s/p stent, HTN presented to the ED for evaluation of right knee pain. on exam with right knee synovitis and ttp over ankle joints and 1st MTPs b/l. labs with hyperuricemia. Imaging with no major fractures or erosions.     Impression:  Acute polyarticular gout     Recommendations: during the exam with complains of knee right knee pain, ankle pain and 1st MTP b/l.  -start solumedrol 40 mg IV daily  -c/w with home Colchicine 0.6 mg daily PO  -will need uric acid lowering therapy 4 weeks after acute attack resolved  -will reassess response to steroids daily      Jayy Saleh  Rheum Fellow I  Pager: [phone]  D/w Dr. Manriquez

## 2020-06-08 NOTE — CONSULT NOTE ADULT - SUBJECTIVE AND OBJECTIVE BOX
GANESH GANDHI  [id number]    HISTORY OF PRESENT ILLNESS:    70yo M with PMHx of Gout, CAD s/p stent, HTN presented to the ED for evaluation of right knee pain. the patient and daughter reported that his symptoms started around 2 weeks ago with pain on the right knee. progressively the pain intensity increased, the developed mild knee swelling and difficulty moving the knee due to pain. the pain had no specific quality, not radiated and was associated with less intense pain in other joint including the ankles and b/l MTPs. He has had similar gout attacks in the past and presented the ED as the pain was not improving.     PAST MEDICAL & SURGICAL HISTORY:  Hernia  Kidney stones  Gout  MI (myocardial infarction): with 3 stents  CAD (coronary artery disease)  HLD (hyperlipidemia)  Hypertension  Gout  H/O hernia repair      MEDICATIONS  (STANDING):  bisoprolol   Tablet 5 milliGRAM(s) Oral daily  clopidogrel Tablet 75 milliGRAM(s) Oral daily  enoxaparin Injectable 40 milliGRAM(s) SubCutaneous daily  lisinopril 10 milliGRAM(s) Oral daily  melatonin 5 milliGRAM(s) Oral at bedtime  methylPREDNISolone sodium succinate Injectable 40 milliGRAM(s) IV Push daily    MEDICATIONS  (PRN):  acetaminophen   Tablet .. 650 milliGRAM(s) Oral every 6 hours PRN Temp greater or equal to 38C (100.4F), Mild Pain (1 - 3), Moderate Pain (4 - 6), Severe Pain (7 - 10)      Allergies    No Known Allergies    Intolerances        PERTINENT MEDICATION HISTORY:    SOCIAL HISTORY: no reported toxic habits      FAMILY HISTORY:  No pertinent family history in first degree relatives      Vital Signs Last 24 Hrs  T(C): 36.8 (08 Jun 2020 05:00), Max: 36.8 (08 Jun 2020 05:00)  T(F): 98.2 (08 Jun 2020 05:00), Max: 98.2 (08 Jun 2020 05:00)  HR: 64 (08 Jun 2020 13:43) (62 - 64)  BP: 139/74 (08 Jun 2020 13:43) (101/63 - 151/93)  BP(mean): --  RR: 18 (08 Jun 2020 13:43) (18 - 18)  SpO2: 96% (08 Jun 2020 13:43) (96% - 97%)    Physical Exam:  General: No apparent distress  HEENT: EOMI, MMM  CVS: +S1/S2, RRR, no murmurs/rubs/gallops  Resp: CTA b/l. No crackles/wheezing  GI: Soft, NT/ND +BS  MSK: right knee swelling with increased temperature to palpation. restricted ROM and ttp. no skin changes noted. there was also ttp over b/l ankles and 1st MTPs there was a left olecranon bursitis and visible tophi over the 4 PIP on the right hand.  Neuro: AAOx3      LABS:                        14.1   9.32  )-----------( 554      ( 08 Jun 2020 09:28 )             45.1     06-08    138  |  101  |  25<H>  ----------------------------<  140<H>  4.5   |  23  |  0.99    Ca    10.0      08 Jun 2020 09:28  Phos  3.2     06-08  Mg     2.4     06-08    TPro  7.9  /  Alb  4.5  /  TBili  0.8  /  DBili  x   /  AST  19  /  ALT  30  /  AlkPhos  73  06-08      Uric Acid, Serum (06.06.20 @ 08:39)    Uric Acid, Serum: 10.7 mg/dL        RADIOLOGY & ADDITIONAL STUDIES:    XRAY KNEE:    INTERPRETATION:  CLINICAL INFORMATION: Right knee pain    TECHNIQUE: 4 views of the right knee 6/6/2020    COMPARISON: None    FINDINGS:     No acute fracture dislocation of the right knee. There is patellofemoral joint arthrosis. Small joint effusion. Small suprapatellar enthesophyte.    IMPRESSION:   No evidence of acute fracture or dislocation. Small right knee joint effusion.

## 2020-06-08 NOTE — PROGRESS NOTE ADULT - PROBLEM SELECTOR PLAN 1
-recurrent gout flare; overall presentation without fever/leukocytosis, no significant effusion on exam, and imaging without significant inflammatory change or large effusion  -s/p toradol in ER. No significant improvement w/naproxen  -c/w prednisone 40 mg daily  -elevated uric acid level  -PT rec home PT   -fall prec -recurrent gout flare; overall presentation without fever/leukocytosis, no significant effusion on exam, and imaging without significant inflammatory change or large effusion  -s/p toradol in ER. No significant improvement w/naproxen  -c/w prednisone 40 mg daily  -elevated uric acid level  -PT rec home PT   -fall prec  - minimal improvement in symptoms following several days of PO prednisone; rheumatology consulted, appreciate recs.

## 2020-06-08 NOTE — PROGRESS NOTE ADULT - ATTENDING COMMENTS
Patient seen and examined. Translation provided by daughter via phone, I offered hospital  services, patient preferred daughter. Currently, he states pain in his right knee has not improved, still 8/10, w/ limited ROM. Knee w/ no erythema, slight warmth, no obvious swelling noted. Patient w/ pain upon flexion and extension of the knee. Will continue steroids, and consult rheumatology for possible intra-articular steroid injection. He has a rheumatologist-in [place], and states he has had gout flares in the past.

## 2020-06-09 DIAGNOSIS — R00.2 PALPITATIONS: ICD-10-CM

## 2020-06-09 PROCEDURE — 93010 ELECTROCARDIOGRAM REPORT: CPT

## 2020-06-09 PROCEDURE — 93306 TTE W/DOPPLER COMPLETE: CPT | Mod: 26

## 2020-06-09 PROCEDURE — 99233 SBSQ HOSP IP/OBS HIGH 50: CPT

## 2020-06-09 RX ORDER — COLCHICINE 0.6 MG
0.6 TABLET ORAL DAILY
Refills: 0 | Status: DISCONTINUED | OUTPATIENT
Start: 2020-06-09 | End: 2020-06-13

## 2020-06-09 RX ADMIN — CLOPIDOGREL BISULFATE 75 MILLIGRAM(S): 75 TABLET, FILM COATED ORAL at 12:57

## 2020-06-09 RX ADMIN — Medication 5 MILLIGRAM(S): at 22:34

## 2020-06-09 RX ADMIN — LISINOPRIL 10 MILLIGRAM(S): 2.5 TABLET ORAL at 05:46

## 2020-06-09 RX ADMIN — BISOPROLOL FUMARATE 5 MILLIGRAM(S): 10 TABLET, FILM COATED ORAL at 05:46

## 2020-06-09 RX ADMIN — Medication 40 MILLIGRAM(S): at 05:46

## 2020-06-09 RX ADMIN — Medication 0.6 MILLIGRAM(S): at 13:09

## 2020-06-09 RX ADMIN — ENOXAPARIN SODIUM 40 MILLIGRAM(S): 100 INJECTION SUBCUTANEOUS at 12:57

## 2020-06-09 NOTE — PROGRESS NOTE ADULT - PROBLEM SELECTOR PLAN 5
Transitions of Care Status:  1.  Name of PCP:  2.  PCP Contacted on Admission: [ ] Y    [ ] N    3.  PCP contacted at Discharge: [ ] Y    [ ] N    [ ] N/A  4.  Post-Discharge Appointment Date and Location:  5.  Summary of Handoff given to PCP: lovenox vte ppx

## 2020-06-09 NOTE — PROGRESS NOTE ADULT - PROBLEM SELECTOR PLAN 2
-c/w plavix, bisprolol 5 daily, lisinopril 10 daily  -off aspirin - c/w plavix, bisprolol 5 daily, lisinopril 10 daily  - off aspirin  - started colchicine 0.6mg once daily

## 2020-06-09 NOTE — PROGRESS NOTE ADULT - PROBLEM SELECTOR PLAN 1
-recurrent gout flare; overall presentation without fever/leukocytosis, no significant effusion on exam, and imaging without significant inflammatory change or large effusion  -s/p toradol in ER. No significant improvement w/naproxen  -c/w prednisone 40 mg daily  -elevated uric acid level  -PT rec home PT   -fall prec  - minimal improvement in symptoms following several days of PO prednisone; rheumatology consulted, appreciate recs. History of recurrent gout flare; overall presentation without fever/leukocytosis, no significant effusion on exam, and imaging without significant inflammatory change or large effusion. Serum urate >10 on admission. S/p toradol in ER. No significant improvement w/naproxen  - was initially on prednisone 40mg PO once daily with minimal improvement in symptoms. PO steroids now discontinued in favor of IVP  - rheum c/s'ed, appreciate recs; started on IV solumedrol 40mg IVO once daily, first dose this AM  - started colchicine 0.6mg once daily  - PT rec home PT   - fall prec

## 2020-06-09 NOTE — PROGRESS NOTE ADULT - PROBLEM SELECTOR PLAN 3
c/w bisprolol, lisinopril as above History of CAD s/p stents and episodes of palpitations with diaphoresis on exertion. Last such episode ~1-1.5 weeks prior to admission.  - episode of SOB and palpitations overnight w/o sweats  - ECG unremarkable  - given cardiac history and poor f/u, will obtain TTE while inpatient and check orthostatics.   - f/u TSH in the AM

## 2020-06-09 NOTE — PROGRESS NOTE ADULT - SUBJECTIVE AND OBJECTIVE BOX
Edward Muro MD  Internal Medicine, PGY-1  Beeper: [phone] ([facility])/ [id number] ([facility])     Subjective:    Patient is a 69y old  Male who presents with a chief complaint of gout flare (08 Jun 2020 18:13)    No acute overnight events.    Patient was seen and examined at bedside this AM. Denied fever, chills, nausea, vomiting, CP, palpitations, coughing, wheezing, SOB, and abdominal pain.      MEDICATIONS  (STANDING):  bisoprolol   Tablet 5 milliGRAM(s) Oral daily  clopidogrel Tablet 75 milliGRAM(s) Oral daily  enoxaparin Injectable 40 milliGRAM(s) SubCutaneous daily  lisinopril 10 milliGRAM(s) Oral daily  melatonin 5 milliGRAM(s) Oral at bedtime  methylPREDNISolone sodium succinate Injectable 40 milliGRAM(s) IV Push daily    MEDICATIONS  (PRN):  acetaminophen   Tablet .. 650 milliGRAM(s) Oral every 6 hours PRN Temp greater or equal to 38C (100.4F), Mild Pain (1 - 3), Moderate Pain (4 - 6), Severe Pain (7 - 10)      Objective:    Vitals: Vital Signs Last 24 Hrs  T(C): 36.3 (06-09-20 @ 04:25), Max: 36.4 (06-08-20 @ 21:40)  T(F): 97.4 (06-09-20 @ 04:25), Max: 97.6 (06-08-20 @ 21:40)  HR: 60 (06-09-20 @ 04:25) (60 - 65)  BP: 119/75 (06-09-20 @ 04:25) (119/75 - 139/74)  BP(mean): --  RR: 18 (06-09-20 @ 04:25) (18 - 18)  SpO2: 98% (06-09-20 @ 04:25) (95% - 98%)              I&O's Summary    08 Jun 2020 07:01  -  09 Jun 2020 07:00  --------------------------------------------------------  IN: 150 mL / OUT: 1515 mL / NET: -1365 mL      PHYSICAL EXAM:  GENERAL: NAD, well-groomed, well-developed  HEAD:  Atraumatic, Normocephalic  EYES: PERRLA, conjunctiva and sclera clear  ENMT: No tonsillar erythema, exudates, or enlargement; Moist mucous membranes, Good dentition, No lesions  NECK: Supple, No JVD, Normal thyroid  CHEST/LUNG: Clear to auscultation bilaterally; No rales, rhonchi, wheezing, or rubs  HEART: Regular rate and rhythm; No murmurs, rubs, or gallops  ABDOMEN: Soft, Nontender, Nondistended; Bowel sounds present  EXTREMITIES:  2+ Peripheral Pulses, No clubbing, cyanosis, or edema  LYMPH: No lymphadenopathy noted  SKIN: No rashes or lesions noted  NERVOUS SYSTEM:  Alert & Oriented X3, Good concentration; Motor Strength 5/5 B/L upper and lower extremities; DTRs 2+ intact and symmetric                                               LABS:  06-08    138  |  101  |  25<H>  ----------------------------<  140<H>  4.5   |  23  |  0.99  06-07    139  |  104  |  23  ----------------------------<  121<H>  4.6   |  23  |  0.96  06-06    139  |  105  |  26<H>  ----------------------------<  99  4.7   |  21<L>  |  1.03    Ca    10.0      08 Jun 2020 09:28  Ca    9.8      07 Jun 2020 08:48  Ca    9.4      06 Jun 2020 08:39  Phos  3.2     06-08  Mg     2.4     06-08    TPro  7.9  /  Alb  4.5  /  TBili  0.8  /  DBili  x   /  AST  19  /  ALT  30  /  AlkPhos  73  06-08  TPro  7.6  /  Alb  4.2  /  TBili  0.6  /  DBili  x   /  AST  12  /  ALT  22  /  AlkPhos  77  06-07  TPro  7.1  /  Alb  3.8  /  TBili  0.6  /  DBili  x   /  AST  13  /  ALT  21  /  AlkPhos  74  06-06                                                    14.1   9.32  )-----------( 554      ( 08 Jun 2020 09:28 )             45.1                         13.2   10.72 )-----------( 505      ( 07 Jun 2020 08:48 )             41.3                         13.0   8.04  )-----------( 464      ( 06 Jun 2020 08:39 )             40.8     CAPILLARY BLOOD GLUCOSE            RECENT CULTURES:      TELEMETRY:    ECG:    TTE:    RADIOLOGY & ADDITIONAL TESTS:    Imaging Personally Reviewed:  [ ] YES  [ ] NO    Consultants involved in case:   Consultant(s) Notes Reviewed:  [ ] YES  [ ] NO:   Care Discussed with Consultants/Other Providers [ ] YES  [ ] NO Edward Muro MD  Internal Medicine, PGY-1  Beeper: [phone] ([facility])/ [id number] ([facility])     Subjective:    Patient is a 69y old  Male who presents with a chief complaint of gout flare (08 Jun 2020 18:13)    No acute overnight events.    Patient was seen and examined at bedside this AM. States that he awoke at ~3AM last night to urinate; after urinating to urinal, experienced SOB and palpitations for 3-4 minutes. Patient does experience occasional palpitations with diaphoresis with exertion, but states SOB is new and patient did not have any sweating during this episode last night. Patient states knee pain is minimally improved compared to admission. However, patient was unable to move his R knee yesterday, but was able to bear weight on it this morning, though it caused sharp pain. Received first dose of IV Solumedrol this morning. Denied fever, chills, nausea, vomiting, coughing, wheezing, SOB, and abdominal pain.      MEDICATIONS  (STANDING):  bisoprolol   Tablet 5 milliGRAM(s) Oral daily  clopidogrel Tablet 75 milliGRAM(s) Oral daily  enoxaparin Injectable 40 milliGRAM(s) SubCutaneous daily  lisinopril 10 milliGRAM(s) Oral daily  melatonin 5 milliGRAM(s) Oral at bedtime  methylPREDNISolone sodium succinate Injectable 40 milliGRAM(s) IV Push daily    MEDICATIONS  (PRN):  acetaminophen   Tablet .. 650 milliGRAM(s) Oral every 6 hours PRN Temp greater or equal to 38C (100.4F), Mild Pain (1 - 3), Moderate Pain (4 - 6), Severe Pain (7 - 10)      Objective:    Vitals: Vital Signs Last 24 Hrs  T(C): 36.3 (06-09-20 @ 04:25), Max: 36.4 (06-08-20 @ 21:40)  T(F): 97.4 (06-09-20 @ 04:25), Max: 97.6 (06-08-20 @ 21:40)  HR: 60 (06-09-20 @ 04:25) (60 - 65)  BP: 119/75 (06-09-20 @ 04:25) (119/75 - 139/74)  RR: 18 (06-09-20 @ 04:25) (18 - 18)  SpO2: 98% (06-09-20 @ 04:25) (95% - 98%)                I&O's Summary  08 Jun 2020 07:01  -  09 Jun 2020 07:00  --------------------------------------------------------  IN: 150 mL / OUT: 1515 mL / NET: -1365 mL      PHYSICAL EXAM:  GENERAL: NAD, well-groomed, well-developed  HEAD:  Atraumatic, Normocephalic  EYES: conjunctiva and sclera clear  CHEST/LUNG: Clear to auscultation bilaterally; No rales, rhonchi, wheezing, or rubs  HEART: Regular rate and rhythm; No murmurs, rubs, or gallops  ABDOMEN: Soft, Nontender, Nondistended; Bowel sounds present  EXTREMITIES: No LE edema. Both knees cool, non-erythematous, and minimally TTP  NERVOUS SYSTEM:  Alert & Oriented X3, Good concentration      LABS:  No labs drawn this AM

## 2020-06-09 NOTE — PROGRESS NOTE ADULT - ATTENDING COMMENTS
Patient seen and examined.  ID: [id number] Patient states pain till there in knee, but today he was able to bear weight and increased ROM of his right knee. Will continue w/ IV steroids for now. He states he has some pain in his right shin, no evidence of trauma, bruising, swelling, erythema. Pain likely referred from knee. Patient noted to have some palpitations overnight, was not hypoxic, tachycardic during this time. Will obtain ekg, TSH as well. Can obtain TTE to evaluate for any structural heart disease.  Will obtain orthostatics as well. Patient only has brief episodes of palpitations, likely from PVC's, if above work-up negative, patient to have further work-up outpatient for consideration of holter monitor.

## 2020-06-10 LAB — TSH SERPL-MCNC: 2.13 UIU/ML — SIGNIFICANT CHANGE UP (ref 0.27–4.2)

## 2020-06-10 PROCEDURE — 99232 SBSQ HOSP IP/OBS MODERATE 35: CPT | Mod: GC

## 2020-06-10 PROCEDURE — 99233 SBSQ HOSP IP/OBS HIGH 50: CPT

## 2020-06-10 RX ORDER — SENNA PLUS 8.6 MG/1
2 TABLET ORAL AT BEDTIME
Refills: 0 | Status: DISCONTINUED | OUTPATIENT
Start: 2020-06-10 | End: 2020-06-13

## 2020-06-10 RX ORDER — POLYETHYLENE GLYCOL 3350 17 G/17G
17 POWDER, FOR SOLUTION ORAL DAILY
Refills: 0 | Status: DISCONTINUED | OUTPATIENT
Start: 2020-06-10 | End: 2020-06-13

## 2020-06-10 RX ADMIN — CLOPIDOGREL BISULFATE 75 MILLIGRAM(S): 75 TABLET, FILM COATED ORAL at 12:04

## 2020-06-10 RX ADMIN — Medication 0.6 MILLIGRAM(S): at 12:04

## 2020-06-10 RX ADMIN — Medication 40 MILLIGRAM(S): at 06:03

## 2020-06-10 RX ADMIN — LISINOPRIL 10 MILLIGRAM(S): 2.5 TABLET ORAL at 06:02

## 2020-06-10 RX ADMIN — SENNA PLUS 2 TABLET(S): 8.6 TABLET ORAL at 21:30

## 2020-06-10 RX ADMIN — Medication 5 MILLIGRAM(S): at 21:30

## 2020-06-10 RX ADMIN — BISOPROLOL FUMARATE 5 MILLIGRAM(S): 10 TABLET, FILM COATED ORAL at 06:02

## 2020-06-10 RX ADMIN — ENOXAPARIN SODIUM 40 MILLIGRAM(S): 100 INJECTION SUBCUTANEOUS at 12:04

## 2020-06-10 RX ADMIN — POLYETHYLENE GLYCOL 3350 17 GRAM(S): 17 POWDER, FOR SOLUTION ORAL at 12:50

## 2020-06-10 NOTE — PROGRESS NOTE ADULT - PROBLEM SELECTOR PLAN 1
History of recurrent gout flare; overall presentation without fever/leukocytosis, no significant effusion on exam, and imaging without significant inflammatory change or large effusion. Serum urate >10 on admission. S/p toradol in ER. No significant improvement w/naproxen  - was initially on prednisone 40mg PO once daily with minimal improvement in symptoms. PO steroids now discontinued in favor of IVP  - rheum c/s'ed, appreciate recs; started on IV solumedrol 40mg IVO once daily, first dose this AM  - started colchicine 0.6mg once daily  - PT rec home PT   - fall prec History of recurrent gout flare; overall presentation without fever/leukocytosis, no significant effusion on exam, and imaging without significant inflammatory change or large effusion. Serum urate >10 on admission. S/p toradol in ER. No significant improvement w/naproxen  - was initially on prednisone 40mg PO once daily with minimal improvement in symptoms. PO steroids now discontinued in favor of IVP  - rheum c/s'ed, appreciate recs; started on IV solumedrol 40mg IVO once daily  - improvement in knee pain and ROM, will consider steroid taper soon.  - c/w colchicine 0.6mg once daily  - PT rec home PT   - fall prec

## 2020-06-10 NOTE — PROGRESS NOTE ADULT - SUBJECTIVE AND OBJECTIVE BOX
Edward Muro MD  Internal Medicine, PGY-1  Beeper: [phone] ([facility])/ [id number] ([facility])     Subjective:    Patient is a 69y old  Male who presents with a chief complaint of gout flare (09 Jun 2020 07:18)    No acute overnight events.    Patient was seen and examined at bedside this AM. Denied fever, chills, nausea, vomiting, CP, palpitations, coughing, wheezing, SOB, and abdominal pain.      MEDICATIONS  (STANDING):  bisoprolol   Tablet 5 milliGRAM(s) Oral daily  clopidogrel Tablet 75 milliGRAM(s) Oral daily  colchicine 0.6 milliGRAM(s) Oral daily  enoxaparin Injectable 40 milliGRAM(s) SubCutaneous daily  lisinopril 10 milliGRAM(s) Oral daily  melatonin 5 milliGRAM(s) Oral at bedtime  methylPREDNISolone sodium succinate Injectable 40 milliGRAM(s) IV Push daily    MEDICATIONS  (PRN):  acetaminophen   Tablet .. 650 milliGRAM(s) Oral every 6 hours PRN Temp greater or equal to 38C (100.4F), Mild Pain (1 - 3), Moderate Pain (4 - 6), Severe Pain (7 - 10)      Objective:    Vitals: Vital Signs Last 24 Hrs  T(C): 36.3 (06-10-20 @ 05:50), Max: 36.8 (06-09-20 @ 21:09)  T(F): 97.4 (06-10-20 @ 05:50), Max: 98.3 (06-09-20 @ 21:09)  HR: 63 (06-10-20 @ 05:50) (61 - 63)  BP: 119/71 (06-10-20 @ 05:50) (110/63 - 119/71)  BP(mean): --  RR: 18 (06-10-20 @ 05:50) (18 - 18)  SpO2: 97% (06-10-20 @ 05:50) (97% - 98%)              I&O's Summary    09 Jun 2020 07:01  -  10 Shashi 2020 07:00  --------------------------------------------------------  IN: 480 mL / OUT: 0 mL / NET: 480 mL      PHYSICAL EXAM:  GENERAL: NAD, well-groomed, well-developed  HEAD:  Atraumatic, Normocephalic  EYES: PERRLA, conjunctiva and sclera clear  ENMT: No tonsillar erythema, exudates, or enlargement; Moist mucous membranes, Good dentition, No lesions  NECK: Supple, No JVD, Normal thyroid  CHEST/LUNG: Clear to auscultation bilaterally; No rales, rhonchi, wheezing, or rubs  HEART: Regular rate and rhythm; No murmurs, rubs, or gallops  ABDOMEN: Soft, Nontender, Nondistended; Bowel sounds present  EXTREMITIES:  2+ Peripheral Pulses, No clubbing, cyanosis, or edema  LYMPH: No lymphadenopathy noted  SKIN: No rashes or lesions noted  NERVOUS SYSTEM:  Alert & Oriented X3, Good concentration; Motor Strength 5/5 B/L upper and lower extremities; DTRs 2+ intact and symmetric                                               LABS:  06-08    138  |  101  |  25<H>  ----------------------------<  140<H>  4.5   |  23  |  0.99  06-07    139  |  104  |  23  ----------------------------<  121<H>  4.6   |  23  |  0.96    Ca    10.0      08 Jun 2020 09:28  Ca    9.8      07 Jun 2020 08:48  Phos  3.2     06-08  Mg     2.4     06-08    TPro  7.9  /  Alb  4.5  /  TBili  0.8  /  DBili  x   /  AST  19  /  ALT  30  /  AlkPhos  73  06-08  TPro  7.6  /  Alb  4.2  /  TBili  0.6  /  DBili  x   /  AST  12  /  ALT  22  /  AlkPhos  77  06-07                                                    14.1   9.32  )-----------( 554      ( 08 Jun 2020 09:28 )             45.1                         13.2   10.72 )-----------( 505      ( 07 Jun 2020 08:48 )             41.3     CAPILLARY BLOOD GLUCOSE            RECENT CULTURES:      TELEMETRY:    ECG:    TTE:    RADIOLOGY & ADDITIONAL TESTS:    Imaging Personally Reviewed:  [ ] YES  [ ] NO    Consultants involved in case:   Consultant(s) Notes Reviewed:  [ ] YES  [ ] NO:   Care Discussed with Consultants/Other Providers [ ] YES  [ ] NO Edward Muro MD  Internal Medicine, PGY-1  Beeper: [phone] ([facility])/ [id number] ([facility])     Subjective:    Patient is a 69y old  Male who presents with a chief complaint of gout flare (09 Jun 2020 07:18)    No acute overnight events.    Patient was seen and examined at bedside this AM; Best Teacher ID [id number] provided translations. Patient endorsing sharp, R-sided shoulder blade pain that occurs with expiration; no pain with movement. Otherwise, no other complaints; no further episodes of palpitations. Was informed about results of TTE performed yesterday. Denied fever, chills, nausea, vomiting, CP, coughing, wheezing, SOB, and abdominal pain.      MEDICATIONS  (STANDING):  bisoprolol   Tablet 5 milliGRAM(s) Oral daily  clopidogrel Tablet 75 milliGRAM(s) Oral daily  colchicine 0.6 milliGRAM(s) Oral daily  enoxaparin Injectable 40 milliGRAM(s) SubCutaneous daily  lisinopril 10 milliGRAM(s) Oral daily  melatonin 5 milliGRAM(s) Oral at bedtime  methylPREDNISolone sodium succinate Injectable 40 milliGRAM(s) IV Push daily    MEDICATIONS  (PRN):  acetaminophen   Tablet .. 650 milliGRAM(s) Oral every 6 hours PRN Temp greater or equal to 38C (100.4F), Mild Pain (1 - 3), Moderate Pain (4 - 6), Severe Pain (7 - 10)      Objective:    Vitals: Vital Signs Last 24 Hrs  T(C): 36.3 (06-10-20 @ 05:50), Max: 36.8 (06-09-20 @ 21:09)  T(F): 97.4 (06-10-20 @ 05:50), Max: 98.3 (06-09-20 @ 21:09)  HR: 63 (06-10-20 @ 05:50) (61 - 63)  BP: 119/71 (06-10-20 @ 05:50) (110/63 - 119/71)  RR: 18 (06-10-20 @ 05:50) (18 - 18)  SpO2: 97% (06-10-20 @ 05:50) (97% - 98%)                I&O's Summary  09 Jun 2020 07:01  -  10 Shashi 2020 07:00  --------------------------------------------------------  IN: 480 mL / OUT: 0 mL / NET: 480 mL      PHYSICAL EXAM:  GENERAL: elderly, [place]-speaking male resting comfortably in bed in NAD  HEAD:  Atraumatic, Normocephalic  EYES: conjunctiva and sclera clear  CHEST/LUNG: Clear to auscultation bilaterally; No rales, rhonchi, wheezing, or rubs  HEART: Regular rate and rhythm; No murmurs, rubs, or gallops  ABDOMEN: Soft, Nontender, Nondistended; Bowel sounds present  EXTREMITIES: No LE edema. R knee slightly warmer than left, but non-erythematous and non-TTP. Patient with more ROM than previously. Endorsing pain with movements and weight-bearing  NERVOUS SYSTEM: awake and alert, interactive                              LABS:  Thyroid Stimulating Hormone, Serum: 2.13 uIU/mL (06.10.20 @ 08:31)

## 2020-06-10 NOTE — PROGRESS NOTE ADULT - PROBLEM SELECTOR PLAN 2
- c/w plavix, bisprolol 5 daily, lisinopril 10 daily  - off aspirin  - started colchicine 0.6mg once daily - c/w plavix, bisprolol 5 daily, lisinopril 10 daily  - off aspirin  - c/w colchicine 0.6mg once daily

## 2020-06-10 NOTE — PROGRESS NOTE ADULT - ASSESSMENT
70yo M with PMHx of Gout, CAD s/p stent, HTN presented to the ED for evaluation of right knee pain. on exam with right knee synovitis and ttp over ankle joints and 1st MTPs b/l. labs with hyperuricemia. Imaging with no major fractures or erosions.     Impression:  Acute polyarticular gout     Recommendations: improved symptoms, continue with Oral taper.  -stop solumedrol  Steroid taper plan:  Prednisone 30 mg daily PO for 3 days  Prednisone 20 mg daily PO for 3 days  Prednisone 15 mg daily PO for 3 days  Prednisone 10 mg daily PO for 3 days  Prednisone 5 mg daily PO for 3 days  -c/w with home Colchicine 0.6 mg daily PO  -will need uric acid lowering therapy 4 weeks after acute attack resolved  -Outpatient follow up with private rheumatology or PCP.  -Can follow up with [facility] rheumatology practices, Office number: [phone]    Reconsult as needed    Jayy Saleh  Rheum Fellow I  Pager: [phone]  D/w Dr. Manriquez 70yo M with PMHx of Gout, CAD s/p stent, HTN presented to the ED for evaluation of right knee pain. on exam with right knee synovitis and ttp over ankle joints and 1st MTPs b/l. labs with hyperuricemia. Imaging with no major fractures or erosions.     Impression:  Acute polyarticular gout     Recommendations: improved symptoms, continue with Oral taper.  -stop solumedrol  Steroid taper plan:  Prednisone 30 mg daily PO for 3 days  Prednisone 20 mg daily PO for 3 days  Prednisone 15 mg daily PO for 3 days  Prednisone 10 mg daily PO for 3 days  Prednisone 5 mg daily PO for 3 days  -c/w with home Colchicine 0.6 mg daily PO  -will need uric acid lowering therapy 4 weeks after acute attack resolved  -Can follow up with [facility] rheumatology practices, Office number: [phone]    Reconsult as needed    Jayy Saleh  Rheum Fellow I  Pager: [phone]  D/w Dr. Manriquez

## 2020-06-10 NOTE — PROGRESS NOTE ADULT - ATTENDING COMMENTS
Patient seen and examined. ROM of knee slightly better than yesterday. Will continue w/ IV steroids for today, possible transition to po steroids tomorrow. TTE: w/ Stage I diastolic dysfunction, ekg w/ no acute changes, TSH wnl. No further complaints of palpitations. Patient can have further work-up outpatient for consideration of holter monitor is any further palpitations.

## 2020-06-10 NOTE — PROGRESS NOTE ADULT - SUBJECTIVE AND OBJECTIVE BOX
IOSEF HIRAM  [id number]    INTERVAL HPI/OVERNIGHT EVENTS:    improved pain and ROM    MEDICATIONS  (STANDING):  bisoprolol   Tablet 5 milliGRAM(s) Oral daily  clopidogrel Tablet 75 milliGRAM(s) Oral daily  colchicine 0.6 milliGRAM(s) Oral daily  enoxaparin Injectable 40 milliGRAM(s) SubCutaneous daily  lisinopril 10 milliGRAM(s) Oral daily  melatonin 5 milliGRAM(s) Oral at bedtime  methylPREDNISolone sodium succinate Injectable 40 milliGRAM(s) IV Push daily  polyethylene glycol 3350 17 Gram(s) Oral daily  senna 2 Tablet(s) Oral at bedtime    MEDICATIONS  (PRN):  acetaminophen   Tablet .. 650 milliGRAM(s) Oral every 6 hours PRN Temp greater or equal to 38C (100.4F), Mild Pain (1 - 3), Moderate Pain (4 - 6), Severe Pain (7 - 10)          Vital Signs Last 24 Hrs  T(C): 36.4 (10 Shashi 2020 14:19), Max: 36.8 (09 Jun 2020 21:09)  T(F): 97.6 (10 Shashi 2020 14:19), Max: 98.3 (09 Jun 2020 21:09)  HR: 80 (10 Shashi 2020 14:19) (63 - 80)  BP: 131/66 (10 Shashi 2020 14:19) (119/71 - 131/66)  BP(mean): --  RR: 18 (10 Shashi 2020 14:19) (18 - 18)  SpO2: 96% (10 Shashi 2020 14:19) (96% - 98%)    Physical Exam:  General: NAD  HEENT: EOMI, MMM  Cardio: +S1/S2, RRR  Resp: CTA b/l  GI: +BS, soft, NT/ND  MSK: improve pain over the  R-knee with improve ROM and mild/moderate ttp  Neuro: AAOx3      LABS:                  RADIOLOGY & ADDITIONAL TESTS:

## 2020-06-10 NOTE — PROGRESS NOTE ADULT - PROBLEM SELECTOR PLAN 3
History of CAD s/p stents and episodes of palpitations with diaphoresis on exertion. Last such episode ~1-1.5 weeks prior to admission.  - episode of SOB and palpitations overnight w/o sweats  - ECG unremarkable  - given cardiac history and poor f/u, will obtain TTE while inpatient and check orthostatics.   - f/u TSH in the AM History of CAD s/p stents and episodes of palpitations with diaphoresis on exertion. Last such episode ~1-1.5 weeks prior to admission.  - episode of SOB and palpitations overnight 6/9 w/o sweats  - ECG and TTE unremarkable  - TSH WNL this AM

## 2020-06-11 PROCEDURE — 99233 SBSQ HOSP IP/OBS HIGH 50: CPT

## 2020-06-11 RX ADMIN — BISOPROLOL FUMARATE 5 MILLIGRAM(S): 10 TABLET, FILM COATED ORAL at 05:08

## 2020-06-11 RX ADMIN — Medication 30 MILLIGRAM(S): at 05:08

## 2020-06-11 RX ADMIN — ENOXAPARIN SODIUM 40 MILLIGRAM(S): 100 INJECTION SUBCUTANEOUS at 11:40

## 2020-06-11 RX ADMIN — Medication 0.6 MILLIGRAM(S): at 11:39

## 2020-06-11 RX ADMIN — CLOPIDOGREL BISULFATE 75 MILLIGRAM(S): 75 TABLET, FILM COATED ORAL at 11:39

## 2020-06-11 RX ADMIN — SENNA PLUS 2 TABLET(S): 8.6 TABLET ORAL at 21:28

## 2020-06-11 RX ADMIN — LISINOPRIL 10 MILLIGRAM(S): 2.5 TABLET ORAL at 05:08

## 2020-06-11 RX ADMIN — Medication 5 MILLIGRAM(S): at 21:28

## 2020-06-11 NOTE — PROGRESS NOTE ADULT - ATTENDING COMMENTS
Patient seen and examined. Translation via daughter Lisa over phone, which patient prefers. ROM of knee slightly better than yesterday, still cannot fully extend knee. Started po steroids today. No further complaints of palpitations. Patient can have further work-up outpatient for consideration of holter monitor is any further palpitations. Likely dispo in 24-28 hours if pain improved.

## 2020-06-11 NOTE — PROGRESS NOTE ADULT - SUBJECTIVE AND OBJECTIVE BOX
Edward Muro MD  Internal Medicine, PGY-1  Beeper: [phone] ([facility])/ [id number] ([facility])     Subjective:    Patient is a 69y old  Male who presents with a chief complaint of gout flare (10 Shashi 2020 16:40)    No acute overnight events.    Patient was seen and examined at bedside this AM. Denied fever, chills, nausea, vomiting, CP, palpitations, coughing, wheezing, SOB, and abdominal pain.      MEDICATIONS  (STANDING):  bisoprolol   Tablet 5 milliGRAM(s) Oral daily  clopidogrel Tablet 75 milliGRAM(s) Oral daily  colchicine 0.6 milliGRAM(s) Oral daily  enoxaparin Injectable 40 milliGRAM(s) SubCutaneous daily  lisinopril 10 milliGRAM(s) Oral daily  melatonin 5 milliGRAM(s) Oral at bedtime  polyethylene glycol 3350 17 Gram(s) Oral daily  predniSONE   Tablet 30 milliGRAM(s) Oral daily  senna 2 Tablet(s) Oral at bedtime    MEDICATIONS  (PRN):  acetaminophen   Tablet .. 650 milliGRAM(s) Oral every 6 hours PRN Temp greater or equal to 38C (100.4F), Mild Pain (1 - 3), Moderate Pain (4 - 6), Severe Pain (7 - 10)      Objective:    Vitals: Vital Signs Last 24 Hrs  T(C): 36.5 (06-11-20 @ 04:13), Max: 36.5 (06-11-20 @ 04:13)  T(F): 97.7 (06-11-20 @ 04:13), Max: 97.7 (06-11-20 @ 04:13)  HR: 66 (06-11-20 @ 04:13) (66 - 80)  BP: 113/73 (06-11-20 @ 04:13) (113/73 - 131/66)  BP(mean): --  RR: 18 (06-11-20 @ 04:13) (18 - 18)  SpO2: 95% (06-11-20 @ 04:13) (95% - 97%)              I&O's Summary    10 Shashi 2020 07:01  -  11 Jun 2020 07:00  --------------------------------------------------------  IN: 960 mL / OUT: 1875 mL / NET: -915 mL      PHYSICAL EXAM:  GENERAL: NAD, well-groomed, well-developed  HEAD:  Atraumatic, Normocephalic  EYES: PERRLA, conjunctiva and sclera clear  ENMT: No tonsillar erythema, exudates, or enlargement; Moist mucous membranes, Good dentition, No lesions  NECK: Supple, No JVD, Normal thyroid  CHEST/LUNG: Clear to auscultation bilaterally; No rales, rhonchi, wheezing, or rubs  HEART: Regular rate and rhythm; No murmurs, rubs, or gallops  ABDOMEN: Soft, Nontender, Nondistended; Bowel sounds present  EXTREMITIES:  2+ Peripheral Pulses, No clubbing, cyanosis, or edema  LYMPH: No lymphadenopathy noted  SKIN: No rashes or lesions noted  NERVOUS SYSTEM:  Alert & Oriented X3, Good concentration; Motor Strength 5/5 B/L upper and lower extremities; DTRs 2+ intact and symmetric                                               LABS:  06-08    138  |  101  |  25<H>  ----------------------------<  140<H>  4.5   |  23  |  0.99    Ca    10.0      08 Jun 2020 09:28    TPro  7.9  /  Alb  4.5  /  TBili  0.8  /  DBili  x   /  AST  19  /  ALT  30  /  AlkPhos  73  06-08                                                    14.1   9.32  )-----------( 554      ( 08 Jun 2020 09:28 )             45.1     CAPILLARY BLOOD GLUCOSE            RECENT CULTURES:      TELEMETRY:    ECG:    TTE:    RADIOLOGY & ADDITIONAL TESTS:    Imaging Personally Reviewed:  [ ] YES  [ ] NO    Consultants involved in case:   Consultant(s) Notes Reviewed:  [ ] YES  [ ] NO:   Care Discussed with Consultants/Other Providers [ ] YES  [ ] NO Edward Muro MD  Internal Medicine, PGY-1  Beeper: [phone] ([facility])/ [id number] ([facility])     Subjective:    Patient is a 69y old  Male who presents with a chief complaint of gout flare (10 Shashi 2020 16:40)    No acute overnight events.    Patient was seen and examined at bedside this AM. Patient had brief episode of palpitations lasting 1.5 minutes with associated mouth dryness that self resolved; did not experience CP, diaphoresis, or dizziness. Still endorsing polyarthralgia in R knee and L ankle. He states that he still cannot walk, but according to nursing staff, patient has been able to ambulate with walker; further, he was seen ambulating to and from the bathroom this afternoon. Patient's daughter provided translation over the phone; updated on patient's clinical status. Denied fever, chills, nausea, vomiting, coughing, wheezing, SOB, and abdominal pain.      MEDICATIONS  (STANDING):  bisoprolol   Tablet 5 milliGRAM(s) Oral daily  clopidogrel Tablet 75 milliGRAM(s) Oral daily  colchicine 0.6 milliGRAM(s) Oral daily  enoxaparin Injectable 40 milliGRAM(s) SubCutaneous daily  lisinopril 10 milliGRAM(s) Oral daily  melatonin 5 milliGRAM(s) Oral at bedtime  polyethylene glycol 3350 17 Gram(s) Oral daily  predniSONE   Tablet 30 milliGRAM(s) Oral daily  senna 2 Tablet(s) Oral at bedtime    MEDICATIONS  (PRN):  acetaminophen   Tablet .. 650 milliGRAM(s) Oral every 6 hours PRN Temp greater or equal to 38C (100.4F), Mild Pain (1 - 3), Moderate Pain (4 - 6), Severe Pain (7 - 10)      Objective:    Vitals: Vital Signs Last 24 Hrs  T(C): 36.5 (06-11-20 @ 04:13), Max: 36.5 (06-11-20 @ 04:13)  T(F): 97.7 (06-11-20 @ 04:13), Max: 97.7 (06-11-20 @ 04:13)  HR: 66 (06-11-20 @ 04:13) (66 - 80)  BP: 113/73 (06-11-20 @ 04:13) (113/73 - 131/66)  RR: 18 (06-11-20 @ 04:13) (18 - 18)  SpO2: 95% (06-11-20 @ 04:13) (95% - 97%)                I&O's Summary  10 Shashi 2020 07:01  -  11 Jun 2020 07:00  --------------------------------------------------------  IN: 960 mL / OUT: 1875 mL / NET: -915 mL      PHYSICAL EXAM:  GENERAL: elderly, Eritrean-speaking male resting comfortably in bed in NAD  HEAD:  Atraumatic, Normocephalic  EYES: conjunctiva and sclera clear  CHEST/LUNG: Clear to auscultation bilaterally; No rales, rhonchi, wheezing, or rubs  HEART: Regular rate and rhythm; No murmurs, rubs, or gallops  ABDOMEN: Soft, Nontender, Nondistended; Bowel sounds present  EXTREMITIES: R lateral knee TTP, but joint was not warm to the touch or have fluctuance. b/l ankles appear swollen, but not warm to the touch, non-TTP  NERVOUS SYSTEM: awake and alert      LABS:  No AM labs ordered

## 2020-06-11 NOTE — DIETITIAN INITIAL EVALUATION ADULT. - ADD RECOMMEND
1) Encouraged continued good PO intake as tolerated and provided diet education, pt made aware RD remains available 2) Monitor PO intake, weight, labs, skin, GI status, diet

## 2020-06-11 NOTE — DIETITIAN INITIAL EVALUATION ADULT. - PHYSICAL APPEARANCE
Pt declined nutrition-focused physical exam at this time. No overt signs of muscle or fat wasting observed./well nourished/overweight/other (specify) Ht: 64 inches (162.6 cm) Wt: 197.3 pounds (89.5 kg) - dosing  BMI: 33.9 kg/m2  IBW: 130 pounds +/-10% %IBW: 152%  Skin: no pressure injuries per flowsheets   Edema: 2+ edema to R knee, R ankle as per flowsheets

## 2020-06-11 NOTE — PROGRESS NOTE ADULT - PROBLEM SELECTOR PLAN 3
History of CAD s/p stents and episodes of palpitations with diaphoresis on exertion. Last such episode ~1-1.5 weeks prior to admission.  - episode of SOB and palpitations overnight 6/9 w/o sweats  - ECG and TTE unremarkable  - TSH WNL this AM

## 2020-06-11 NOTE — DIETITIAN INITIAL EVALUATION ADULT. - OTHER INFO
Pt Kenyan-speaking, declined  services at this time, called daughter for translation.    Pt reports good appetite and intake, consuming 100% of meals. Denies nausea/vomiting/diarrhea/constipation. Last BM today (6/11) per pt. Denies difficulties chewing or swallowing. Confirmed NKFA.    Pt reports good appetite and intake PTA with no recent changes. Follows Kosher diet. Denies recent changes in weight, reports UBW 95-96 kg. Noted most recent weight 92.9 kg, however pt with edema, weight fluctuations likely related to fluid shifts. Will continue to monitor and trend weights. Denies vitamin/mineral supplementation PTA.     Encouraged continued good PO intake and obtained pt's food preferences to encourage intake. Pt declined oral nutrition supplements at this time. Provided Heart Healthy diet education briefly, pt with minimal interest. Encouraged pt to limit saturated fat, cholesterol, and sodium intake. Pt amenable to recommendations and made aware RD remains available.

## 2020-06-11 NOTE — PROGRESS NOTE ADULT - PROBLEM SELECTOR PLAN 1
History of recurrent gout flare; overall presentation without fever/leukocytosis, no significant effusion on exam, and imaging without significant inflammatory change or large effusion. Serum urate >10 on admission. S/p toradol in ER. No significant improvement w/naproxen  - was initially on prednisone 40mg PO once daily with minimal improvement in symptoms. PO steroids now discontinued in favor of IVP  - rheum c/s'ed, appreciate recs; started on IV solumedrol 40mg IVO once daily  - improvement in knee pain and ROM, will consider steroid taper soon.  - c/w colchicine 0.6mg once daily  - PT rec home PT   - fall prec History of recurrent gout flare; overall presentation without fever/leukocytosis, no significant effusion on exam, and imaging without significant inflammatory change or large effusion. Serum urate >10 on admission. S/p toradol in ER. No significant improvement w/naproxen  - s/p 2 days IV solumedrol; now transitioned to PO prednisone taper, as per rheum recs  - rheum c/s'ed, appreciate recs  - improvement in knee pain and ROM, will consider steroid taper soon.  - c/w colchicine 0.6mg once daily  - PT rec home PT   - fall prec

## 2020-06-11 NOTE — PROGRESS NOTE ADULT - PROBLEM SELECTOR PLAN 2
- c/w plavix, bisprolol 5 daily, lisinopril 10 daily  - off aspirin  - c/w colchicine 0.6mg once daily

## 2020-06-11 NOTE — DIETITIAN INITIAL EVALUATION ADULT. - REASON INDICATOR FOR ASSESSMENT
Pt seen for length of stay assessment. Source: comprehensive chart review, pt, pt's daughter via phone. Pt is a 70 yo male with PMH of CAD s/p stents, HTN, and gout, who presented with R knee pain, admitted 6/6 for gout flare.

## 2020-06-12 ENCOUNTER — TRANSCRIPTION ENCOUNTER (OUTPATIENT)
Age: 70
End: 2020-06-12

## 2020-06-12 LAB
ANION GAP SERPL CALC-SCNC: 13 MMOL/L — SIGNIFICANT CHANGE UP (ref 5–17)
BUN SERPL-MCNC: 26 MG/DL — HIGH (ref 7–23)
CALCIUM SERPL-MCNC: 8.9 MG/DL — SIGNIFICANT CHANGE UP (ref 8.4–10.5)
CHLORIDE SERPL-SCNC: 104 MMOL/L — SIGNIFICANT CHANGE UP (ref 96–108)
CO2 SERPL-SCNC: 21 MMOL/L — LOW (ref 22–31)
CREAT SERPL-MCNC: 0.93 MG/DL — SIGNIFICANT CHANGE UP (ref 0.5–1.3)
GLUCOSE SERPL-MCNC: 102 MG/DL — HIGH (ref 70–99)
HCT VFR BLD CALC: 37.4 % — LOW (ref 39–50)
HGB BLD-MCNC: 12.5 G/DL — LOW (ref 13–17)
MAGNESIUM SERPL-MCNC: 2.1 MG/DL — SIGNIFICANT CHANGE UP (ref 1.6–2.6)
MCHC RBC-ENTMCNC: 27.5 PG — SIGNIFICANT CHANGE UP (ref 27–34)
MCHC RBC-ENTMCNC: 33.4 GM/DL — SIGNIFICANT CHANGE UP (ref 32–36)
MCV RBC AUTO: 82.4 FL — SIGNIFICANT CHANGE UP (ref 80–100)
NRBC # BLD: 0 /100 WBCS — SIGNIFICANT CHANGE UP (ref 0–0)
PHOSPHATE SERPL-MCNC: 3.7 MG/DL — SIGNIFICANT CHANGE UP (ref 2.5–4.5)
PLATELET # BLD AUTO: 351 K/UL — SIGNIFICANT CHANGE UP (ref 150–400)
POTASSIUM SERPL-MCNC: 4 MMOL/L — SIGNIFICANT CHANGE UP (ref 3.5–5.3)
POTASSIUM SERPL-SCNC: 4 MMOL/L — SIGNIFICANT CHANGE UP (ref 3.5–5.3)
RBC # BLD: 4.54 M/UL — SIGNIFICANT CHANGE UP (ref 4.2–5.8)
RBC # FLD: 13.6 % — SIGNIFICANT CHANGE UP (ref 10.3–14.5)
SODIUM SERPL-SCNC: 138 MMOL/L — SIGNIFICANT CHANGE UP (ref 135–145)
WBC # BLD: 11.2 K/UL — HIGH (ref 3.8–10.5)
WBC # FLD AUTO: 11.2 K/UL — HIGH (ref 3.8–10.5)

## 2020-06-12 PROCEDURE — 99232 SBSQ HOSP IP/OBS MODERATE 35: CPT | Mod: GC

## 2020-06-12 RX ADMIN — LISINOPRIL 10 MILLIGRAM(S): 2.5 TABLET ORAL at 05:15

## 2020-06-12 RX ADMIN — BISOPROLOL FUMARATE 5 MILLIGRAM(S): 10 TABLET, FILM COATED ORAL at 05:15

## 2020-06-12 RX ADMIN — Medication 30 MILLIGRAM(S): at 05:15

## 2020-06-12 RX ADMIN — ENOXAPARIN SODIUM 40 MILLIGRAM(S): 100 INJECTION SUBCUTANEOUS at 12:07

## 2020-06-12 RX ADMIN — Medication 5 MILLIGRAM(S): at 22:01

## 2020-06-12 RX ADMIN — Medication 650 MILLIGRAM(S): at 22:01

## 2020-06-12 RX ADMIN — CLOPIDOGREL BISULFATE 75 MILLIGRAM(S): 75 TABLET, FILM COATED ORAL at 12:07

## 2020-06-12 RX ADMIN — SENNA PLUS 2 TABLET(S): 8.6 TABLET ORAL at 22:01

## 2020-06-12 RX ADMIN — Medication 0.6 MILLIGRAM(S): at 12:07

## 2020-06-12 NOTE — PROGRESS NOTE ADULT - ASSESSMENT
69 M PMH CAD s/p stents, HTN, Gout, p/w R knee pain, admitted for gout flare. 69 M PMH CAD s/p stents, HTN, Gout, p/w R knee pain, admitted for gout flare; possible discharge tomorrow

## 2020-06-12 NOTE — PROGRESS NOTE ADULT - PROBLEM SELECTOR PLAN 5
lovenox vte ppx DVT ppx:  lovenox vte ppx    Dispo:  - possible discharge tomorrow  - contacted [phone] resident clinic for appointment. Also called rheumatology for appointment

## 2020-06-12 NOTE — PROGRESS NOTE ADULT - PROBLEM SELECTOR PLAN 3
History of CAD s/p stents and episodes of palpitations with diaphoresis on exertion. Last such episode ~1-1.5 weeks prior to admission.  - episode of SOB and palpitations overnight 6/9 w/o sweats  - ECG and TTE unremarkable  - TSH WNL this AM History of CAD s/p stents and episodes of palpitations with diaphoresis on exertion. Last such episode ~1-1.5 weeks prior to admission.  - episode of SOB and palpitations overnight 6/9 w/o sweats  - ECG and TTE unremarkable  - TSH WNL

## 2020-06-12 NOTE — DISCHARGE NOTE NURSING/CASE MANAGEMENT/SOCIAL WORK - PATIENT PORTAL LINK FT
You can access the FollowMyHealth Patient Portal offered by [facility] by registering at the following website: [email or web address]. By joining [facility]’s FollowMyHealth portal, you will also be able to view your health information using other applications (apps) compatible with our system.

## 2020-06-12 NOTE — PROGRESS NOTE ADULT - SUBJECTIVE AND OBJECTIVE BOX
Edward Muro MD  Internal Medicine, PGY-1  Beeper: [phone] ([facility])/ [phone] ([facility])     Subjective:    Patient is a 69y old  Male who presents with a chief complaint of gout flare (11 Jun 2020 07:35)    No acute overnight events.    Patient was seen and examined at bedside this AM. Denied fever, chills, nausea, vomiting, CP, palpitations, coughing, wheezing, SOB, and abdominal pain.      MEDICATIONS  (STANDING):  bisoprolol   Tablet 5 milliGRAM(s) Oral daily  clopidogrel Tablet 75 milliGRAM(s) Oral daily  colchicine 0.6 milliGRAM(s) Oral daily  enoxaparin Injectable 40 milliGRAM(s) SubCutaneous daily  lisinopril 10 milliGRAM(s) Oral daily  melatonin 5 milliGRAM(s) Oral at bedtime  polyethylene glycol 3350 17 Gram(s) Oral daily  predniSONE   Tablet 30 milliGRAM(s) Oral daily  senna 2 Tablet(s) Oral at bedtime    MEDICATIONS  (PRN):  acetaminophen   Tablet .. 650 milliGRAM(s) Oral every 6 hours PRN Temp greater or equal to 38C (100.4F), Mild Pain (1 - 3), Moderate Pain (4 - 6), Severe Pain (7 - 10)      Objective:    Vitals: Vital Signs Last 24 Hrs  T(C): 36.4 (06-12-20 @ 04:17), Max: 36.4 (06-12-20 @ 04:17)  T(F): 97.6 (06-12-20 @ 04:17), Max: 97.6 (06-12-20 @ 04:17)  HR: 63 (06-12-20 @ 04:17) (63 - 74)  BP: 124/71 (06-12-20 @ 04:17) (116/75 - 129/69)  BP(mean): --  RR: 18 (06-12-20 @ 04:17) (18 - 18)  SpO2: 97% (06-12-20 @ 04:17) (97% - 97%)              I&O's Summary    11 Jun 2020 07:01  -  12 Jun 2020 07:00  --------------------------------------------------------  IN: 240 mL / OUT: 300 mL / NET: -60 mL      PHYSICAL EXAM:  GENERAL: NAD, well-groomed, well-developed  HEAD:  Atraumatic, Normocephalic  EYES: PERRLA, conjunctiva and sclera clear  ENMT: No tonsillar erythema, exudates, or enlargement; Moist mucous membranes, Good dentition, No lesions  NECK: Supple, No JVD, Normal thyroid  CHEST/LUNG: Clear to auscultation bilaterally; No rales, rhonchi, wheezing, or rubs  HEART: Regular rate and rhythm; No murmurs, rubs, or gallops  ABDOMEN: Soft, Nontender, Nondistended; Bowel sounds present  EXTREMITIES:  2+ Peripheral Pulses, No clubbing, cyanosis, or edema  LYMPH: No lymphadenopathy noted  SKIN: No rashes or lesions noted  NERVOUS SYSTEM:  Alert & Oriented X3, Good concentration; Motor Strength 5/5 B/L upper and lower extremities; DTRs 2+ intact and symmetric                                               LABS:  06-12    138  |  104  |  26<H>  ----------------------------<  102<H>  4.0   |  21<L>  |  0.93    Ca    8.9      12 Jun 2020 06:51  Phos  3.7     06-12  Mg     2.1     06-12                                                      12.5   11.20 )-----------( 351      ( 12 Jun 2020 06:51 )             37.4     CAPILLARY BLOOD GLUCOSE            RECENT CULTURES:      TELEMETRY:    ECG:    TTE:    RADIOLOGY & ADDITIONAL TESTS:    Imaging Personally Reviewed:  [ ] YES  [ ] NO    Consultants involved in case:   Consultant(s) Notes Reviewed:  [ ] YES  [ ] NO:   Care Discussed with Consultants/Other Providers [ ] YES  [ ] NO Edward Muro MD  Internal Medicine, PGY-1  Beeper: [phone] ([facility])/ [phone] ([facility])     Subjective:    Patient is a 69y old  Male who presents with a chief complaint of gout flare (11 Jun 2020 07:35)    No acute overnight events.    Patient was seen and examined at bedside this AM. Knee pain improved compared to yesterday, though still hurts with palpation and ambulation. States he would like to go home, but unsure if he feels ready. Denied fever, chills, nausea, vomiting, CP, palpitations, coughing, wheezing, SOB, and abdominal pain.      MEDICATIONS  (STANDING):  bisoprolol   Tablet 5 milliGRAM(s) Oral daily  clopidogrel Tablet 75 milliGRAM(s) Oral daily  colchicine 0.6 milliGRAM(s) Oral daily  enoxaparin Injectable 40 milliGRAM(s) SubCutaneous daily  lisinopril 10 milliGRAM(s) Oral daily  melatonin 5 milliGRAM(s) Oral at bedtime  polyethylene glycol 3350 17 Gram(s) Oral daily  predniSONE   Tablet 30 milliGRAM(s) Oral daily  senna 2 Tablet(s) Oral at bedtime    MEDICATIONS  (PRN):  acetaminophen   Tablet .. 650 milliGRAM(s) Oral every 6 hours PRN Temp greater or equal to 38C (100.4F), Mild Pain (1 - 3), Moderate Pain (4 - 6), Severe Pain (7 - 10)      Objective:    Vitals: Vital Signs Last 24 Hrs  T(C): 36.4 (06-12-20 @ 04:17), Max: 36.4 (06-12-20 @ 04:17)  T(F): 97.6 (06-12-20 @ 04:17), Max: 97.6 (06-12-20 @ 04:17)  HR: 63 (06-12-20 @ 04:17) (63 - 74)  BP: 124/71 (06-12-20 @ 04:17) (116/75 - 129/69)  RR: 18 (06-12-20 @ 04:17) (18 - 18)  SpO2: 97% (06-12-20 @ 04:17) (97% - 97%)                I&O's Summary  11 Jun 2020 07:01  -  12 Jun 2020 07:00  --------------------------------------------------------  IN: 240 mL / OUT: 300 mL / NET: -60 mL      PHYSICAL EXAM:  GENERAL: elderly, French-speaking male resting comfortably in bed in NAD  HEAD:  Atraumatic, Normocephalic  EYES: conjunctiva and sclera clear  CHEST/LUNG: Clear to auscultation bilaterally; No rales, rhonchi, wheezing, or rubs  HEART: Regular rate and rhythm; No murmurs, rubs, or gallops  ABDOMEN: Soft, Nontender, Nondistended; Bowel sounds present  EXTREMITIES: R knee mildly TTP and warmer compared to left knee. Non-TTP in b/l ankles  NERVOUS SYSTEM: awake and alert, conversant and interactive      LABS:  06-12    138  |  104  |  26<H>  ----------------------------<  102<H>  4.0   |  21<L>  |  0.93    Ca    8.9      12 Jun 2020 06:51  Phos  3.7     06-12  Mg     2.1     06-12                          12.5   11.20 )-----------( 351      ( 12 Jun 2020 06:51 )             37.4

## 2020-06-12 NOTE — PROGRESS NOTE ADULT - ATTENDING COMMENTS
Patient seen and examined. TRanslation provided by daughter Lisa on the phone. Patient pain is improved, w/ increased ROM of the knee. Will plan for likely discharge tomorrow, with f/u with medicine and rheum as an outpatient. Patient and daughter in agreement with plan.

## 2020-06-12 NOTE — PROGRESS NOTE ADULT - PROBLEM SELECTOR PLAN 1
History of recurrent gout flare; overall presentation without fever/leukocytosis, no significant effusion on exam, and imaging without significant inflammatory change or large effusion. Serum urate >10 on admission. S/p toradol in ER. No significant improvement w/naproxen  - s/p 2 days IV solumedrol; now transitioned to PO prednisone taper, as per rheum recs  - rheum c/s'ed, appreciate recs  - improvement in knee pain and ROM, will consider steroid taper soon.  - c/w colchicine 0.6mg once daily  - PT rec home PT   - fall prec

## 2020-06-13 VITALS
DIASTOLIC BLOOD PRESSURE: 65 MMHG | TEMPERATURE: 98 F | HEART RATE: 72 BPM | RESPIRATION RATE: 18 BRPM | SYSTOLIC BLOOD PRESSURE: 105 MMHG | OXYGEN SATURATION: 97 %

## 2020-06-13 PROBLEM — K46.9 UNSPECIFIED ABDOMINAL HERNIA WITHOUT OBSTRUCTION OR GANGRENE: Chronic | Status: ACTIVE | Noted: 2020-06-05

## 2020-06-13 PROBLEM — N20.0 CALCULUS OF KIDNEY: Chronic | Status: ACTIVE | Noted: 2020-06-05

## 2020-06-13 PROBLEM — I21.9 ACUTE MYOCARDIAL INFARCTION, UNSPECIFIED: Chronic | Status: ACTIVE | Noted: 2020-06-05

## 2020-06-13 PROBLEM — M10.9 GOUT, UNSPECIFIED: Chronic | Status: ACTIVE | Noted: 2020-06-05

## 2020-06-13 PROCEDURE — 93306 TTE W/DOPPLER COMPLETE: CPT

## 2020-06-13 PROCEDURE — 97110 THERAPEUTIC EXERCISES: CPT

## 2020-06-13 PROCEDURE — 80048 BASIC METABOLIC PNL TOTAL CA: CPT

## 2020-06-13 PROCEDURE — 80053 COMPREHEN METABOLIC PANEL: CPT

## 2020-06-13 PROCEDURE — 86803 HEPATITIS C AB TEST: CPT

## 2020-06-13 PROCEDURE — 97530 THERAPEUTIC ACTIVITIES: CPT

## 2020-06-13 PROCEDURE — 84443 ASSAY THYROID STIM HORMONE: CPT

## 2020-06-13 PROCEDURE — 84100 ASSAY OF PHOSPHORUS: CPT

## 2020-06-13 PROCEDURE — 99285 EMERGENCY DEPT VISIT HI MDM: CPT | Mod: 25

## 2020-06-13 PROCEDURE — 71045 X-RAY EXAM CHEST 1 VIEW: CPT

## 2020-06-13 PROCEDURE — 97116 GAIT TRAINING THERAPY: CPT

## 2020-06-13 PROCEDURE — 73564 X-RAY EXAM KNEE 4 OR MORE: CPT

## 2020-06-13 PROCEDURE — 85027 COMPLETE CBC AUTOMATED: CPT

## 2020-06-13 PROCEDURE — 97162 PT EVAL MOD COMPLEX 30 MIN: CPT

## 2020-06-13 PROCEDURE — 84550 ASSAY OF BLOOD/URIC ACID: CPT

## 2020-06-13 PROCEDURE — 83735 ASSAY OF MAGNESIUM: CPT

## 2020-06-13 PROCEDURE — 99239 HOSP IP/OBS DSCHRG MGMT >30: CPT | Mod: GC

## 2020-06-13 PROCEDURE — 85652 RBC SED RATE AUTOMATED: CPT

## 2020-06-13 PROCEDURE — 86140 C-REACTIVE PROTEIN: CPT

## 2020-06-13 PROCEDURE — 96372 THER/PROPH/DIAG INJ SC/IM: CPT

## 2020-06-13 PROCEDURE — 93005 ELECTROCARDIOGRAM TRACING: CPT

## 2020-06-13 RX ORDER — COLCHICINE 0.6 MG
1 TABLET ORAL
Qty: 30 | Refills: 1
Start: 2020-06-13 | End: 2020-08-11

## 2020-06-13 RX ORDER — COLCHICINE 0.6 MG
1 TABLET ORAL
Qty: 0 | Refills: 0 | DISCHARGE

## 2020-06-13 RX ADMIN — POLYETHYLENE GLYCOL 3350 17 GRAM(S): 17 POWDER, FOR SOLUTION ORAL at 11:52

## 2020-06-13 RX ADMIN — LISINOPRIL 10 MILLIGRAM(S): 2.5 TABLET ORAL at 06:49

## 2020-06-13 RX ADMIN — BISOPROLOL FUMARATE 5 MILLIGRAM(S): 10 TABLET, FILM COATED ORAL at 06:49

## 2020-06-13 RX ADMIN — CLOPIDOGREL BISULFATE 75 MILLIGRAM(S): 75 TABLET, FILM COATED ORAL at 11:52

## 2020-06-13 RX ADMIN — ENOXAPARIN SODIUM 40 MILLIGRAM(S): 100 INJECTION SUBCUTANEOUS at 11:52

## 2020-06-13 RX ADMIN — Medication 0.6 MILLIGRAM(S): at 11:52

## 2020-06-13 RX ADMIN — Medication 30 MILLIGRAM(S): at 06:49

## 2020-06-13 NOTE — PROGRESS NOTE ADULT - PROBLEM SELECTOR PLAN 3
History of CAD s/p stents and episodes of palpitations with diaphoresis on exertion. Last such episode ~1-1.5 weeks prior to admission.  - episode of SOB and palpitations overnight 6/9 w/o sweats  - ECG and TTE unremarkable  - TSH WNL

## 2020-06-13 NOTE — PROGRESS NOTE ADULT - PROBLEM SELECTOR PLAN 1
History of recurrent gout flare; overall presentation without fever/leukocytosis, no significant effusion on exam, and imaging without significant inflammatory change or large effusion. Serum urate >10 on admission. S/p toradol in ER. No significant improvement w/naproxen  - s/p 2 days IV solumedrol; now transitioned to PO prednisone taper, as per rheum recs  - rheum c/s'ed, appreciate recs  - improvement in knee pain and ROM  - c/w colchicine 0.6mg once daily  - PT rec home PT   - fall prec

## 2020-06-13 NOTE — PROGRESS NOTE ADULT - PROBLEM SELECTOR PLAN 6
Transitions of Care Status:  1.  Name of PCP:  2.  PCP Contacted on Admission: [ ] Y    [ ] N    3.  PCP contacted at Discharge: [ ] Y    [ ] N    [ ] N/A  4.  Post-Discharge Appointment Date and Location:  5.  Summary of Handoff given to PCP:
Transitions of Care Status:  1.  Name of PCP:  2.  PCP Contacted on Admission: [ ] Y    [ ] N    3.  PCP contacted at Discharge: [ ] Y    [ ] N    [ ] N/A  4.  Post-Discharge Appointment Date and Location: called [phone] clinic and established PCP for f/u  5.  Summary of Handoff given to PCP:
Transitions of Care Status:  1.  Name of PCP:  2.  PCP Contacted on Admission: [ ] Y    [ ] N    3.  PCP contacted at Discharge: [ ] Y    [ ] N    [ ] N/A  4.  Post-Discharge Appointment Date and Location:  5.  Summary of Handoff given to PCP:

## 2020-06-13 NOTE — PROGRESS NOTE ADULT - ATTENDING COMMENTS
Patient seen and examined. Translation provided by daughter, patient feels well, significantly improved flexion/extension of the knee. Dispo planning today w/ plan for steroid taper. PT-home w/ home PT, arranged by ASHLEY. Patient to f/u with medicine and rheum clinic, cardiology as an outpatient. Patient seen and examined. Translation provided by daughter, patient feels well, significantly improved flexion/extension of the knee. Dispo planning today w/ plan for steroid taper. PT-home w/ home PT, arranged by ASHLEY. Patient to f/u with medicine and rheum clinic, cardiology as an outpatient. Discharge planning 40 minutes.

## 2020-06-13 NOTE — PROGRESS NOTE ADULT - PROBLEM SELECTOR PLAN 5
DVT ppx:  lovenox vte ppx    Dispo:  - possible discharge tomorrow  - contacted [facility] for appointment. Also called rheumatology for appointment

## 2020-06-13 NOTE — PROGRESS NOTE ADULT - SUBJECTIVE AND OBJECTIVE BOX
Frandy Woo MD  Cell: [phone]  Pager: [phone]    S:  Pt feeling better today.  Pain in knees improved and feels ready for d/c.  Discussed with pt and daughter per his request for translation.    VITAL SIGNS:  Vital Signs Last 24 Hrs  T(C): 36.6 (13 Jun 2020 04:44), Max: 36.6 (13 Jun 2020 04:44)  T(F): 97.8 (13 Jun 2020 04:44), Max: 97.8 (13 Jun 2020 04:44)  HR: 71 (13 Jun 2020 04:44) (67 - 71)  BP: 103/63 (13 Jun 2020 04:44) (103/63 - 141/72)  BP(mean): --  RR: 18 (13 Jun 2020 04:44) (18 - 18)  SpO2: 98% (13 Jun 2020 04:44) (96% - 98%)      PHYSICAL EXAM:   GENERAL: elderly, Croatian-speaking male resting comfortably in bed in NAD  HEAD:  Atraumatic, Normocephalic  EYES: conjunctiva and sclera clear  CHEST/LUNG: Clear to auscultation bilaterally; No rales, rhonchi, wheezing, or rubs  HEART: Regular rate and rhythm; No murmurs, rubs, or gallops  ABDOMEN: Soft, Nontender, Nondistended; Bowel sounds present  EXTREMITIES: R knee mildly TTP and warmer compared to left knee. Non-TTP in b/l ankles  NERVOUS SYSTEM: awake and alert, conversant and interactive                          12.5   11.20 )-----------( 351      ( 12 Jun 2020 06:51 )             37.4     06-12    138  |  104  |  26<H>  ----------------------------<  102<H>  4.0   |  21<L>  |  0.93    Ca    8.9      12 Jun 2020 06:51  Phos  3.7     06-12  Mg     2.1     06-12        CAPILLARY BLOOD GLUCOSE          MEDICATIONS  (STANDING):  bisoprolol   Tablet 5 milliGRAM(s) Oral daily  clopidogrel Tablet 75 milliGRAM(s) Oral daily  colchicine 0.6 milliGRAM(s) Oral daily  enoxaparin Injectable 40 milliGRAM(s) SubCutaneous daily  lisinopril 10 milliGRAM(s) Oral daily  melatonin 5 milliGRAM(s) Oral at bedtime  polyethylene glycol 3350 17 Gram(s) Oral daily  senna 2 Tablet(s) Oral at bedtime

## 2020-06-15 ENCOUNTER — APPOINTMENT (OUTPATIENT)
Dept: INTERNAL MEDICINE | Facility: CLINIC | Age: 70
End: 2020-06-15

## 2020-06-15 PROBLEM — Z00.00 ENCOUNTER FOR PREVENTIVE HEALTH EXAMINATION: Status: ACTIVE | Noted: 2020-06-15

## 2020-07-24 ENCOUNTER — APPOINTMENT (OUTPATIENT)
Dept: RHEUMATOLOGY | Facility: HOSPITAL | Age: 70
End: 2020-07-24

## 2021-02-15 NOTE — ED ADULT NURSE NOTE - SUICIDE SCREENING QUESTION 2
Is This A New Presentation, Or A Follow-Up?: Follow Up Isotretinoin
Additional History: Pt currently 7,197 Goal 4,726
No

## 2021-05-30 ENCOUNTER — INPATIENT (INPATIENT)
Facility: HOSPITAL | Age: 71
LOS: 4 days | Discharge: HOME HEALTH | End: 2021-06-04
Attending: SURGERY | Admitting: SURGERY
Payer: MEDICAID

## 2021-05-30 VITALS
HEIGHT: 64 IN | TEMPERATURE: 98 F | SYSTOLIC BLOOD PRESSURE: 151 MMHG | DIASTOLIC BLOOD PRESSURE: 83 MMHG | HEART RATE: 86 BPM | RESPIRATION RATE: 22 BRPM | OXYGEN SATURATION: 98 %

## 2021-05-30 DIAGNOSIS — Z98.890 OTHER SPECIFIED POSTPROCEDURAL STATES: Chronic | ICD-10-CM

## 2021-05-30 LAB
ALBUMIN SERPL ELPH-MCNC: 3.8 G/DL — SIGNIFICANT CHANGE UP (ref 3.3–5)
ALP SERPL-CCNC: 98 U/L — SIGNIFICANT CHANGE UP (ref 40–120)
ALT FLD-CCNC: 19 U/L — SIGNIFICANT CHANGE UP (ref 4–41)
ANION GAP SERPL CALC-SCNC: 13 MMOL/L — SIGNIFICANT CHANGE UP (ref 7–14)
APPEARANCE UR: CLEAR — SIGNIFICANT CHANGE UP
AST SERPL-CCNC: 15 U/L — SIGNIFICANT CHANGE UP (ref 4–40)
BASE EXCESS BLDV CALC-SCNC: 1.3 MMOL/L — SIGNIFICANT CHANGE UP (ref -3–2)
BASOPHILS # BLD AUTO: 0.02 K/UL — SIGNIFICANT CHANGE UP (ref 0–0.2)
BASOPHILS NFR BLD AUTO: 0.2 % — SIGNIFICANT CHANGE UP (ref 0–2)
BILIRUB SERPL-MCNC: 0.8 MG/DL — SIGNIFICANT CHANGE UP (ref 0.2–1.2)
BILIRUB UR-MCNC: NEGATIVE — SIGNIFICANT CHANGE UP
BLOOD GAS VENOUS - CREATININE: SIGNIFICANT CHANGE UP MG/DL (ref 0.5–1.3)
BLOOD GAS VENOUS COMPREHENSIVE RESULT: SIGNIFICANT CHANGE UP
BUN SERPL-MCNC: 23 MG/DL — SIGNIFICANT CHANGE UP (ref 7–23)
CALCIUM SERPL-MCNC: 9.2 MG/DL — SIGNIFICANT CHANGE UP (ref 8.4–10.5)
CHLORIDE BLDV-SCNC: 110 MMOL/L — HIGH (ref 96–108)
CHLORIDE SERPL-SCNC: 106 MMOL/L — SIGNIFICANT CHANGE UP (ref 98–107)
CO2 SERPL-SCNC: 22 MMOL/L — SIGNIFICANT CHANGE UP (ref 22–31)
COLOR SPEC: SIGNIFICANT CHANGE UP
CREAT SERPL-MCNC: 1.06 MG/DL — SIGNIFICANT CHANGE UP (ref 0.5–1.3)
DIFF PNL FLD: NEGATIVE — SIGNIFICANT CHANGE UP
EOSINOPHIL # BLD AUTO: 0.21 K/UL — SIGNIFICANT CHANGE UP (ref 0–0.5)
EOSINOPHIL NFR BLD AUTO: 2.4 % — SIGNIFICANT CHANGE UP (ref 0–6)
GAS PNL BLDV: 139 MMOL/L — SIGNIFICANT CHANGE UP (ref 136–146)
GLUCOSE BLDV-MCNC: 141 MG/DL — HIGH (ref 70–99)
GLUCOSE SERPL-MCNC: 147 MG/DL — HIGH (ref 70–99)
GLUCOSE UR QL: NEGATIVE — SIGNIFICANT CHANGE UP
HCO3 BLDV-SCNC: 25 MMOL/L — SIGNIFICANT CHANGE UP (ref 20–27)
HCT VFR BLD CALC: 33.9 % — LOW (ref 39–50)
HCT VFR BLDA CALC: 33.8 % — LOW (ref 39–51)
HGB BLD CALC-MCNC: 11 G/DL — LOW (ref 13–17)
HGB BLD-MCNC: 11.1 G/DL — LOW (ref 13–17)
IANC: 6.48 K/UL — SIGNIFICANT CHANGE UP (ref 1.5–8.5)
IMM GRANULOCYTES NFR BLD AUTO: 0.8 % — SIGNIFICANT CHANGE UP (ref 0–1.5)
KETONES UR-MCNC: NEGATIVE — SIGNIFICANT CHANGE UP
LACTATE BLDV-MCNC: 1.7 MMOL/L — SIGNIFICANT CHANGE UP (ref 0.5–2)
LEUKOCYTE ESTERASE UR-ACNC: NEGATIVE — SIGNIFICANT CHANGE UP
LIDOCAIN IGE QN: 36 U/L — SIGNIFICANT CHANGE UP (ref 7–60)
LYMPHOCYTES # BLD AUTO: 1.31 K/UL — SIGNIFICANT CHANGE UP (ref 1–3.3)
LYMPHOCYTES # BLD AUTO: 14.9 % — SIGNIFICANT CHANGE UP (ref 13–44)
MCHC RBC-ENTMCNC: 27.8 PG — SIGNIFICANT CHANGE UP (ref 27–34)
MCHC RBC-ENTMCNC: 32.7 GM/DL — SIGNIFICANT CHANGE UP (ref 32–36)
MCV RBC AUTO: 84.8 FL — SIGNIFICANT CHANGE UP (ref 80–100)
MONOCYTES # BLD AUTO: 0.68 K/UL — SIGNIFICANT CHANGE UP (ref 0–0.9)
MONOCYTES NFR BLD AUTO: 7.8 % — SIGNIFICANT CHANGE UP (ref 2–14)
NEUTROPHILS # BLD AUTO: 6.48 K/UL — SIGNIFICANT CHANGE UP (ref 1.8–7.4)
NEUTROPHILS NFR BLD AUTO: 73.9 % — SIGNIFICANT CHANGE UP (ref 43–77)
NITRITE UR-MCNC: NEGATIVE — SIGNIFICANT CHANGE UP
NRBC # BLD: 0 /100 WBCS — SIGNIFICANT CHANGE UP
NRBC # FLD: 0 K/UL — SIGNIFICANT CHANGE UP
PCO2 BLDV: 44 MMHG — SIGNIFICANT CHANGE UP (ref 41–51)
PH BLDV: 7.38 — SIGNIFICANT CHANGE UP (ref 7.32–7.43)
PH UR: 6 — SIGNIFICANT CHANGE UP (ref 5–8)
PLATELET # BLD AUTO: 292 K/UL — SIGNIFICANT CHANGE UP (ref 150–400)
PO2 BLDV: 34 MMHG — LOW (ref 35–40)
POTASSIUM BLDV-SCNC: 4.2 MMOL/L — SIGNIFICANT CHANGE UP (ref 3.4–4.5)
POTASSIUM SERPL-MCNC: 4.6 MMOL/L — SIGNIFICANT CHANGE UP (ref 3.5–5.3)
POTASSIUM SERPL-SCNC: 4.6 MMOL/L — SIGNIFICANT CHANGE UP (ref 3.5–5.3)
PROT SERPL-MCNC: 6.7 G/DL — SIGNIFICANT CHANGE UP (ref 6–8.3)
PROT UR-MCNC: NEGATIVE — SIGNIFICANT CHANGE UP
RBC # BLD: 4 M/UL — LOW (ref 4.2–5.8)
RBC # FLD: 14.7 % — HIGH (ref 10.3–14.5)
SAO2 % BLDV: 60.5 % — SIGNIFICANT CHANGE UP (ref 60–85)
SODIUM SERPL-SCNC: 141 MMOL/L — SIGNIFICANT CHANGE UP (ref 135–145)
SP GR SPEC: 1.03 — HIGH (ref 1.01–1.02)
TROPONIN T, HIGH SENSITIVITY RESULT: 7 NG/L — SIGNIFICANT CHANGE UP
TROPONIN T, HIGH SENSITIVITY RESULT: 8 NG/L — SIGNIFICANT CHANGE UP
UROBILINOGEN FLD QL: SIGNIFICANT CHANGE UP
WBC # BLD: 8.77 K/UL — SIGNIFICANT CHANGE UP (ref 3.8–10.5)
WBC # FLD AUTO: 8.77 K/UL — SIGNIFICANT CHANGE UP (ref 3.8–10.5)

## 2021-05-30 PROCEDURE — 93010 ELECTROCARDIOGRAM REPORT: CPT

## 2021-05-30 PROCEDURE — 71046 X-RAY EXAM CHEST 2 VIEWS: CPT | Mod: 26

## 2021-05-30 PROCEDURE — 76705 ECHO EXAM OF ABDOMEN: CPT | Mod: 26

## 2021-05-30 PROCEDURE — 74177 CT ABD & PELVIS W/CONTRAST: CPT | Mod: 26

## 2021-05-30 PROCEDURE — 99285 EMERGENCY DEPT VISIT HI MDM: CPT | Mod: 25

## 2021-05-30 RX ORDER — MORPHINE SULFATE 50 MG/1
4 CAPSULE, EXTENDED RELEASE ORAL ONCE
Refills: 0 | Status: DISCONTINUED | OUTPATIENT
Start: 2021-05-30 | End: 2021-05-30

## 2021-05-30 RX ADMIN — MORPHINE SULFATE 4 MILLIGRAM(S): 50 CAPSULE, EXTENDED RELEASE ORAL at 16:49

## 2021-05-30 RX ADMIN — MORPHINE SULFATE 4 MILLIGRAM(S): 50 CAPSULE, EXTENDED RELEASE ORAL at 17:06

## 2021-05-30 NOTE — ED PROVIDER NOTE - OBJECTIVE STATEMENT
71 yo M with hx of gout, HTN, CAD s/p stents, p/w RUQ pain x 3 days. States the pain started after a cough 3 days ago. States the pain is sharp, persistent and radiates into the R leg. Denies n/v, fevers/chills. Denies alleviating/exacerbation factors. Reports associated constipation and urinary incontinence.

## 2021-05-30 NOTE — ED PROVIDER NOTE - NS ED ROS FT
Constitutional:  (-) fever, (-) chills, (-) lethargy.  Eyes:  (-) eye pain (-) visual changes.  ENMT: (-) nasal discharge, (-) sore throat. (-) neck pain or stiffness.  Cardiac: (-) chest pain (-) palpitations.  Respiratory:  (-) cough (-) respiratory distress.   GI:  (-) nausea (-) vomiting (-) diarrhea (+) abdominal pain.  :  (-) dysuria (-) frequency (-) burning.  MS:  (-) back pain (-) joint pain.  Neuro:  (-) headache (-) numbness (-) tingling (-) focal weakness.  Skin:  (-) rash.  Except as documented in the HPI,  all other systems are negative.

## 2021-05-30 NOTE — ED PROVIDER NOTE - PMH
CAD (coronary artery disease)    Gout    Gout    Hernia    HLD (hyperlipidemia)    Hypertension    Kidney stones    MI (myocardial infarction)  with 3 stents

## 2021-05-30 NOTE — ED PROVIDER NOTE - ATTENDING CONTRIBUTION TO CARE
69 yo M past medical history gout, htn, cad with 3 days of ruq pain. pain started after cough 2 days ago. denies cp, sob, n/v/d. + constipation denies urinary complaints. exam as above. ekg nsr, no evidence of acute ischemic changes. abd pain, abd distention, constipation. Ddx includes, but not limited to, biliary pathology, colitis. less likely ACS. plan: labs, ,us, ct, symptom relief prn, reassess.

## 2021-05-30 NOTE — ED ADULT NURSE NOTE - OBJECTIVE STATEMENT
Pt received AOx4, ambulatory w/ assistance at baseline w. PMHX of HTN, enlarged prostate, borderline DM, and CAD s/p stent placements presents to the ED w/ 3 days of ongoing sharp RUQ pain that all begin w/ a strong cough. Pt states the pain the is exacerbated w/ minimal movement. Pt also states he has not been able to keep his urine in for the past month. Abdomen firm and distended which pt states is not his norm. Denies CP, SOB, NVD, chills, fever. LBM: 2 days ago. Pt placed on the cardiac monitor, reading sinus. Breathing mildly labored, saturating ~ 95% on RA.

## 2021-05-30 NOTE — ED ADULT NURSE NOTE - ED STAT RN HANDOFF DETAILS
Patient received back from float RN. Patient breathing even and nonlabored. No acute distress. No physical indicators of pain or complaints at this time. COVID swab sent. Safety maintained. Report given to [facility] nurse.  IV intact. Patient stable upon exiting the ED

## 2021-05-30 NOTE — ED ADULT NURSE NOTE - INTERVENTIONS DEFINITIONS
[place] to call system/Call bell, personal items and telephone within reach/Instruct patient to call for assistance/Room bathroom lighting operational/Non-slip footwear when patient is off stretcher/Physically safe environment: no spills, clutter or unnecessary equipment/Stretcher in lowest position, wheels locked, appropriate side rails in place/Provide visual cue, wrist band, yellow gown, etc./Monitor gait and stability

## 2021-05-30 NOTE — ED ADULT NURSE REASSESSMENT NOTE - NS ED NURSE REASSESS COMMENT FT1
Patient received from day RN. Patient resting quietly in bed, breathing even and nonlabored. Patient appears comfortable. No acute distress. Awaiting ultrasound. Daughter at bedside. Safety maintained. Patient stable upon exiting the room.

## 2021-05-30 NOTE — ED ADULT TRIAGE NOTE - CHIEF COMPLAINT QUOTE
p/t c/o of rt upper quad pain for 3 days, and sob on inspiration, p/t c/o of increased abd girth as well

## 2021-05-30 NOTE — ED PROVIDER NOTE - PHYSICAL EXAMINATION
Gen: NAD, AAOx3, uncomfortable, non-toxic appearing.   Head: normal appearing.   HEENT: normal conjunctiva, oral mucosa moist.  Lung:  no respiratory distress, speaking in full sentences, clear to ascultation bilaterally.  CV: regular rate and rhythm. pulses equal bilaterally.   Abd: soft, moderately distended with ttp in the RUQ, +Escalante's sign. no rebound or guarding.   MSK: no visible deformities.  Neuro: No focal deficits.  Skin: Warm.  Psych: normal affect.

## 2021-05-30 NOTE — ED PROVIDER NOTE - CLINICAL SUMMARY MEDICAL DECISION MAKING FREE TEXT BOX
69 yo M with hx of gout, HTN, CAD s/p stents, p/w RUQ pain x 3 days. States the pain started after a cough 3 days ago. Uncomfortable appearing, vss, with ruq ttp. Concerning for biliary colic vs ACS. Possible constipation causing overflow incontinence. Plan for labs, ruq us, pain control, ecg, and reassess.

## 2021-05-31 DIAGNOSIS — R10.9 UNSPECIFIED ABDOMINAL PAIN: ICD-10-CM

## 2021-05-31 LAB
ANION GAP SERPL CALC-SCNC: 12 MMOL/L — SIGNIFICANT CHANGE UP (ref 7–14)
BUN SERPL-MCNC: 24 MG/DL — HIGH (ref 7–23)
CALCIUM SERPL-MCNC: 9.7 MG/DL — SIGNIFICANT CHANGE UP (ref 8.4–10.5)
CHLORIDE SERPL-SCNC: 106 MMOL/L — SIGNIFICANT CHANGE UP (ref 98–107)
CO2 SERPL-SCNC: 24 MMOL/L — SIGNIFICANT CHANGE UP (ref 22–31)
CREAT SERPL-MCNC: 1.03 MG/DL — SIGNIFICANT CHANGE UP (ref 0.5–1.3)
GLUCOSE BLDC GLUCOMTR-MCNC: 128 MG/DL — HIGH (ref 70–99)
GLUCOSE BLDC GLUCOMTR-MCNC: 89 MG/DL — SIGNIFICANT CHANGE UP (ref 70–99)
GLUCOSE BLDC GLUCOMTR-MCNC: 98 MG/DL — SIGNIFICANT CHANGE UP (ref 70–99)
GLUCOSE SERPL-MCNC: 113 MG/DL — HIGH (ref 70–99)
HCT VFR BLD CALC: 39.3 % — SIGNIFICANT CHANGE UP (ref 39–50)
HGB BLD-MCNC: 12.2 G/DL — LOW (ref 13–17)
MAGNESIUM SERPL-MCNC: 2.1 MG/DL — SIGNIFICANT CHANGE UP (ref 1.6–2.6)
MCHC RBC-ENTMCNC: 26.8 PG — LOW (ref 27–34)
MCHC RBC-ENTMCNC: 31 GM/DL — LOW (ref 32–36)
MCV RBC AUTO: 86.4 FL — SIGNIFICANT CHANGE UP (ref 80–100)
NRBC # BLD: 0 /100 WBCS — SIGNIFICANT CHANGE UP
NRBC # FLD: 0 K/UL — SIGNIFICANT CHANGE UP
PA ADP PRP-ACNC: 392 PRU — SIGNIFICANT CHANGE UP (ref 194–418)
PHOSPHATE SERPL-MCNC: 4.2 MG/DL — SIGNIFICANT CHANGE UP (ref 2.5–4.5)
PLATELET # BLD AUTO: 298 K/UL — SIGNIFICANT CHANGE UP (ref 150–400)
POTASSIUM SERPL-MCNC: 4.5 MMOL/L — SIGNIFICANT CHANGE UP (ref 3.5–5.3)
POTASSIUM SERPL-SCNC: 4.5 MMOL/L — SIGNIFICANT CHANGE UP (ref 3.5–5.3)
RBC # BLD: 4.55 M/UL — SIGNIFICANT CHANGE UP (ref 4.2–5.8)
RBC # FLD: 15 % — HIGH (ref 10.3–14.5)
SARS-COV-2 RNA SPEC QL NAA+PROBE: SIGNIFICANT CHANGE UP
SODIUM SERPL-SCNC: 142 MMOL/L — SIGNIFICANT CHANGE UP (ref 135–145)
WBC # BLD: 8.06 K/UL — SIGNIFICANT CHANGE UP (ref 3.8–10.5)
WBC # FLD AUTO: 8.06 K/UL — SIGNIFICANT CHANGE UP (ref 3.8–10.5)

## 2021-05-31 RX ORDER — ENOXAPARIN SODIUM 100 MG/ML
40 INJECTION SUBCUTANEOUS DAILY
Refills: 0 | Status: DISCONTINUED | OUTPATIENT
Start: 2021-05-31 | End: 2021-06-01

## 2021-05-31 RX ORDER — METFORMIN HYDROCHLORIDE 850 MG/1
1 TABLET ORAL
Qty: 0 | Refills: 0 | DISCHARGE

## 2021-05-31 RX ORDER — ALLOPURINOL 300 MG
300 TABLET ORAL DAILY
Refills: 0 | Status: DISCONTINUED | OUTPATIENT
Start: 2021-05-31 | End: 2021-06-04

## 2021-05-31 RX ORDER — ATORVASTATIN CALCIUM 80 MG/1
40 TABLET, FILM COATED ORAL AT BEDTIME
Refills: 0 | Status: DISCONTINUED | OUTPATIENT
Start: 2021-05-31 | End: 2021-06-04

## 2021-05-31 RX ORDER — SODIUM CHLORIDE 9 MG/ML
1000 INJECTION, SOLUTION INTRAVENOUS
Refills: 0 | Status: DISCONTINUED | OUTPATIENT
Start: 2021-05-31 | End: 2021-06-03

## 2021-05-31 RX ORDER — DEXTROSE 50 % IN WATER 50 %
25 SYRINGE (ML) INTRAVENOUS ONCE
Refills: 0 | Status: DISCONTINUED | OUTPATIENT
Start: 2021-05-31 | End: 2021-06-03

## 2021-05-31 RX ORDER — AMLODIPINE BESYLATE 2.5 MG/1
1 TABLET ORAL
Qty: 0 | Refills: 0 | DISCHARGE

## 2021-05-31 RX ORDER — SODIUM CHLORIDE 9 MG/ML
1000 INJECTION, SOLUTION INTRAVENOUS
Refills: 0 | Status: DISCONTINUED | OUTPATIENT
Start: 2021-05-31 | End: 2021-05-31

## 2021-05-31 RX ORDER — IBUPROFEN 200 MG
600 TABLET ORAL ONCE
Refills: 0 | Status: COMPLETED | OUTPATIENT
Start: 2021-05-31 | End: 2021-05-31

## 2021-05-31 RX ORDER — METOPROLOL TARTRATE 50 MG
25 TABLET ORAL
Refills: 0 | Status: DISCONTINUED | OUTPATIENT
Start: 2021-05-31 | End: 2021-05-31

## 2021-05-31 RX ORDER — FINASTERIDE 5 MG/1
5 TABLET, FILM COATED ORAL DAILY
Refills: 0 | Status: DISCONTINUED | OUTPATIENT
Start: 2021-05-31 | End: 2021-06-04

## 2021-05-31 RX ORDER — ATORVASTATIN CALCIUM 80 MG/1
1 TABLET, FILM COATED ORAL
Qty: 0 | Refills: 0 | DISCHARGE

## 2021-05-31 RX ORDER — METOPROLOL TARTRATE 50 MG
50 TABLET ORAL
Refills: 0 | Status: DISCONTINUED | OUTPATIENT
Start: 2021-05-31 | End: 2021-06-04

## 2021-05-31 RX ORDER — RAMIPRIL 5 MG
1 CAPSULE ORAL
Qty: 0 | Refills: 0 | DISCHARGE

## 2021-05-31 RX ORDER — PIPERACILLIN AND TAZOBACTAM 4; .5 G/20ML; G/20ML
3.38 INJECTION, POWDER, LYOPHILIZED, FOR SOLUTION INTRAVENOUS EVERY 8 HOURS
Refills: 0 | Status: DISCONTINUED | OUTPATIENT
Start: 2021-05-31 | End: 2021-06-02

## 2021-05-31 RX ORDER — LOSARTAN POTASSIUM 100 MG/1
100 TABLET, FILM COATED ORAL DAILY
Refills: 0 | Status: DISCONTINUED | OUTPATIENT
Start: 2021-05-31 | End: 2021-06-04

## 2021-05-31 RX ORDER — METOPROLOL TARTRATE 50 MG
1 TABLET ORAL
Qty: 0 | Refills: 0 | DISCHARGE

## 2021-05-31 RX ORDER — FINASTERIDE 5 MG/1
1 TABLET, FILM COATED ORAL
Qty: 0 | Refills: 0 | DISCHARGE

## 2021-05-31 RX ORDER — TAMSULOSIN HYDROCHLORIDE 0.4 MG/1
0.4 CAPSULE ORAL AT BEDTIME
Refills: 0 | Status: DISCONTINUED | OUTPATIENT
Start: 2021-05-31 | End: 2021-06-04

## 2021-05-31 RX ORDER — PANTOPRAZOLE SODIUM 20 MG/1
40 TABLET, DELAYED RELEASE ORAL
Refills: 0 | Status: DISCONTINUED | OUTPATIENT
Start: 2021-05-31 | End: 2021-06-04

## 2021-05-31 RX ORDER — INSULIN LISPRO 100/ML
VIAL (ML) SUBCUTANEOUS EVERY 6 HOURS
Refills: 0 | Status: DISCONTINUED | OUTPATIENT
Start: 2021-05-31 | End: 2021-06-01

## 2021-05-31 RX ORDER — ASPIRIN/CALCIUM CARB/MAGNESIUM 324 MG
0 TABLET ORAL
Qty: 0 | Refills: 0 | DISCHARGE

## 2021-05-31 RX ORDER — GLUCAGON INJECTION, SOLUTION 0.5 MG/.1ML
1 INJECTION, SOLUTION SUBCUTANEOUS ONCE
Refills: 0 | Status: DISCONTINUED | OUTPATIENT
Start: 2021-05-31 | End: 2021-06-03

## 2021-05-31 RX ORDER — ACETAMINOPHEN 500 MG
650 TABLET ORAL EVERY 6 HOURS
Refills: 0 | Status: DISCONTINUED | OUTPATIENT
Start: 2021-05-31 | End: 2021-05-31

## 2021-05-31 RX ORDER — ESCITALOPRAM OXALATE 10 MG/1
1 TABLET, FILM COATED ORAL
Qty: 0 | Refills: 0 | DISCHARGE

## 2021-05-31 RX ORDER — ALLOPURINOL 300 MG
1 TABLET ORAL
Qty: 0 | Refills: 0 | DISCHARGE

## 2021-05-31 RX ORDER — TADALAFIL 10 MG/1
1 TABLET, FILM COATED ORAL
Qty: 0 | Refills: 0 | DISCHARGE

## 2021-05-31 RX ORDER — ONDANSETRON 8 MG/1
4 TABLET, FILM COATED ORAL ONCE
Refills: 0 | Status: DISCONTINUED | OUTPATIENT
Start: 2021-05-31 | End: 2021-06-03

## 2021-05-31 RX ORDER — BISOPROLOL FUMARATE 10 MG/1
1 TABLET, FILM COATED ORAL
Qty: 0 | Refills: 0 | DISCHARGE

## 2021-05-31 RX ORDER — ESCITALOPRAM OXALATE 10 MG/1
5 TABLET, FILM COATED ORAL DAILY
Refills: 0 | Status: DISCONTINUED | OUTPATIENT
Start: 2021-05-31 | End: 2021-06-04

## 2021-05-31 RX ORDER — CLOPIDOGREL BISULFATE 75 MG/1
1 TABLET, FILM COATED ORAL
Qty: 0 | Refills: 0 | DISCHARGE

## 2021-05-31 RX ORDER — PANTOPRAZOLE SODIUM 20 MG/1
1 TABLET, DELAYED RELEASE ORAL
Qty: 0 | Refills: 0 | DISCHARGE

## 2021-05-31 RX ORDER — SODIUM CHLORIDE 9 MG/ML
1000 INJECTION, SOLUTION INTRAVENOUS
Refills: 0 | Status: DISCONTINUED | OUTPATIENT
Start: 2021-05-31 | End: 2021-06-02

## 2021-05-31 RX ORDER — LOSARTAN/HYDROCHLOROTHIAZIDE 100MG-25MG
1 TABLET ORAL
Qty: 0 | Refills: 0 | DISCHARGE

## 2021-05-31 RX ORDER — ACETAMINOPHEN 500 MG
1000 TABLET ORAL ONCE
Refills: 0 | Status: COMPLETED | OUTPATIENT
Start: 2021-05-31 | End: 2021-05-31

## 2021-05-31 RX ORDER — ACETAMINOPHEN 500 MG
1000 TABLET ORAL EVERY 6 HOURS
Refills: 0 | Status: COMPLETED | OUTPATIENT
Start: 2021-05-31 | End: 2021-06-01

## 2021-05-31 RX ORDER — DEXTROSE 50 % IN WATER 50 %
12.5 SYRINGE (ML) INTRAVENOUS ONCE
Refills: 0 | Status: DISCONTINUED | OUTPATIENT
Start: 2021-05-31 | End: 2021-06-03

## 2021-05-31 RX ORDER — DEXTROSE 50 % IN WATER 50 %
15 SYRINGE (ML) INTRAVENOUS ONCE
Refills: 0 | Status: DISCONTINUED | OUTPATIENT
Start: 2021-05-31 | End: 2021-06-03

## 2021-05-31 RX ORDER — INSULIN LISPRO 100/ML
VIAL (ML) SUBCUTANEOUS
Refills: 0 | Status: DISCONTINUED | OUTPATIENT
Start: 2021-05-31 | End: 2021-05-31

## 2021-05-31 RX ORDER — PIPERACILLIN AND TAZOBACTAM 4; .5 G/20ML; G/20ML
3.38 INJECTION, POWDER, LYOPHILIZED, FOR SOLUTION INTRAVENOUS ONCE
Refills: 0 | Status: COMPLETED | OUTPATIENT
Start: 2021-05-31 | End: 2021-05-31

## 2021-05-31 RX ADMIN — Medication 1000 MILLIGRAM(S): at 22:00

## 2021-05-31 RX ADMIN — Medication 650 MILLIGRAM(S): at 10:53

## 2021-05-31 RX ADMIN — PIPERACILLIN AND TAZOBACTAM 25 GRAM(S): 4; .5 INJECTION, POWDER, LYOPHILIZED, FOR SOLUTION INTRAVENOUS at 17:08

## 2021-05-31 RX ADMIN — PIPERACILLIN AND TAZOBACTAM 25 GRAM(S): 4; .5 INJECTION, POWDER, LYOPHILIZED, FOR SOLUTION INTRAVENOUS at 10:23

## 2021-05-31 RX ADMIN — ATORVASTATIN CALCIUM 40 MILLIGRAM(S): 80 TABLET, FILM COATED ORAL at 21:19

## 2021-05-31 RX ADMIN — Medication 400 MILLIGRAM(S): at 21:20

## 2021-05-31 RX ADMIN — Medication 400 MILLIGRAM(S): at 02:05

## 2021-05-31 RX ADMIN — Medication 650 MILLIGRAM(S): at 10:23

## 2021-05-31 RX ADMIN — Medication 600 MILLIGRAM(S): at 14:37

## 2021-05-31 RX ADMIN — SODIUM CHLORIDE 125 MILLILITER(S): 9 INJECTION, SOLUTION INTRAVENOUS at 21:19

## 2021-05-31 RX ADMIN — Medication 300 MILLIGRAM(S): at 17:08

## 2021-05-31 RX ADMIN — Medication 50 MILLIGRAM(S): at 17:08

## 2021-05-31 RX ADMIN — TAMSULOSIN HYDROCHLORIDE 0.4 MILLIGRAM(S): 0.4 CAPSULE ORAL at 21:19

## 2021-05-31 RX ADMIN — Medication 400 MILLIGRAM(S): at 15:07

## 2021-05-31 RX ADMIN — ESCITALOPRAM OXALATE 5 MILLIGRAM(S): 10 TABLET, FILM COATED ORAL at 17:07

## 2021-05-31 RX ADMIN — PANTOPRAZOLE SODIUM 40 MILLIGRAM(S): 20 TABLET, DELAYED RELEASE ORAL at 17:07

## 2021-05-31 RX ADMIN — SODIUM CHLORIDE 125 MILLILITER(S): 9 INJECTION, SOLUTION INTRAVENOUS at 10:22

## 2021-05-31 RX ADMIN — Medication 1000 MILLIGRAM(S): at 15:37

## 2021-05-31 RX ADMIN — SODIUM CHLORIDE 125 MILLILITER(S): 9 INJECTION, SOLUTION INTRAVENOUS at 02:05

## 2021-05-31 RX ADMIN — FINASTERIDE 5 MILLIGRAM(S): 5 TABLET, FILM COATED ORAL at 17:07

## 2021-05-31 RX ADMIN — Medication 600 MILLIGRAM(S): at 14:07

## 2021-05-31 RX ADMIN — LOSARTAN POTASSIUM 100 MILLIGRAM(S): 100 TABLET, FILM COATED ORAL at 17:08

## 2021-05-31 RX ADMIN — Medication 25 MILLIGRAM(S): at 06:28

## 2021-05-31 RX ADMIN — PIPERACILLIN AND TAZOBACTAM 200 GRAM(S): 4; .5 INJECTION, POWDER, LYOPHILIZED, FOR SOLUTION INTRAVENOUS at 02:05

## 2021-05-31 RX ADMIN — ENOXAPARIN SODIUM 40 MILLIGRAM(S): 100 INJECTION SUBCUTANEOUS at 14:07

## 2021-05-31 NOTE — H&P ADULT - NSHPPHYSICALEXAM_GEN_ALL_CORE
General: No Acute Distress.  Neuro: Alert and oriented, no focal deficits, moves all extremities spontaneously.  HEENT: Extraocular movements intact. Mucosa moist.   Respiratory: Airway patent, respirations unlabored.  Cardiovascular: Pulse present.   Abdomen: Large, soft, tender in RUQ with +Escalante's, nondistended.  Genitourinary: No obvious lesions.  Extremities: Warm, moves all four.  Musculoskeletal: No tenderness of extremities, mobile joints.   Integumentary: No obvious lesions.

## 2021-05-31 NOTE — PATIENT PROFILE ADULT - LIFE CHALLENGES - DETAILS
Patient's daughter brought [place], [place]  2 yrs [place], but he lives along in an apartment. Pt has home health aid comes [id number] to 3pm, he fall 2xtimes recently. Daughter requesting more time for aid  and support.

## 2021-05-31 NOTE — H&P ADULT - NSHPLABSRESULTS_GEN_ALL_CORE
Labs:                       11.1   8.77  )-----------( 292      ( 30 May 2021 16:58 )             33.9       141  |  106  |  23  ----------------------------<  147<H>  4.6   |  22  |  1.06    Ca    9.2      30 May 2021 16:58    TPro  6.7  /  Alb  3.8  /  TBili  0.8  /  DBili  x   /  AST  15  /  ALT  19  /  AlkPhos  98    Urinalysis Basic - ( 30 May 2021 19:48 )    Color: Light Yellow / Appearance: Clear / S.028 / pH: x  Gluc: x / Ketone: Negative  / Bili: Negative / Urobili: <2 mg/dL   Blood: x / Protein: Negative / Nitrite: Negative   Leuk Esterase: Negative / RBC: x / WBC x   Sq Epi: x / Non Sq Epi: x / Bacteria: x    < from: US Abdomen Upper Quadrant Right (21 @ 21:35) >      IMPRESSION:    Cholelithiasis with tenderness during exam. Consider follow-up with nuclear medicine DISIDA scan which is more sensitive for the detection of acute cholecystitis.    < from: CT Abdomen and Pelvis w/ IV Cont (21 @ 18:14) >    IMPRESSION:  No acute abdominopelvic pathology.

## 2021-05-31 NOTE — H&P ADULT - ASSESSMENT
Mr. Zimmerman is a 70 Year-Old Gentleman with history of CAD/MI status post stent placed in 01/2021 on Plavix, gout, HTN, HLD, presenting with 3 days of RUQ pain, found to have cholelithiases.     - Admit to Surgery, Dr. Martinez.   - Will plan for laparoscopic cholecystectomy this admission, however patient currently on Plavix for recent cardiac stent placement, last taken 5/30/21 AM.   - Nil Per Os, Intravenous Fluid Resuscitation Therapy.   - Intravenous antibiotic administration: Zosyn.  - Will obtain Cardiology consult (Dr. Ovalle) in AM for operative clearance, home cardiologist (Dr. Campuzano [facility])  - Med Rec not completed, daughter and patient at bedside unsure of medications and dosing, [facility] in [place] currently closed, Daughter requesting we call her at [phone] after 0900 to obtain med rec.   - Will start low dose metoprolol in lieu of home beta blocker until med rec.   - Holding Plavix for operative procedure.    - Discussed with Attending Surgeon.     B Team Surgery Pager #[phone]

## 2021-05-31 NOTE — PATIENT PROFILE ADULT - NSPROEXTENSIONSOFSELF_GEN_A_NUR
Patient use cane and walker at home,/dentures Patient use cane and walker at home, And phone at bedside./dentures

## 2021-05-31 NOTE — H&P ADULT - HISTORY OF PRESENT ILLNESS
HPI obtained with assistance of Maltese interpretation by Daughter present at Bedside, declined use of Maltese .     Mr. Zimmerman is a 70 Year-Old Maltese Speaking Gentleman with history of CAD/MI status post stent placed in 01/2021 on Plavix, gout, HTN, HLD, presenting with 3 days of RUQ pain.  States he has never had symptoms like this before. Pain worsens with cough, radiates down legs. Also notes constipation. No nausea/emesis, has been tolerating diet. No fevers or chills.     Has never had endoscopy or colonoscopy before. Surgical history notable for R inguinal hernia repair ~20 years ago.     On evaluation in the Emergency Department, vitals were within normal limits. Physical exam notable for RUQ tenderness to palpation with + Escalante's sign. Labs drawn, normal WBC and LFTs. CT and U/S Imaging obtained, with findings of cholelithiasis.

## 2021-06-01 LAB
A1C WITH ESTIMATED AVERAGE GLUCOSE RESULT: 6 % — HIGH (ref 4–5.6)
ALBUMIN SERPL ELPH-MCNC: 3.7 G/DL — SIGNIFICANT CHANGE UP (ref 3.3–5)
ALP SERPL-CCNC: 87 U/L — SIGNIFICANT CHANGE UP (ref 40–120)
ALT FLD-CCNC: 18 U/L — SIGNIFICANT CHANGE UP (ref 4–41)
ANION GAP SERPL CALC-SCNC: 14 MMOL/L — SIGNIFICANT CHANGE UP (ref 7–14)
AST SERPL-CCNC: 19 U/L — SIGNIFICANT CHANGE UP (ref 4–40)
BILIRUB SERPL-MCNC: 1.3 MG/DL — HIGH (ref 0.2–1.2)
BUN SERPL-MCNC: 16 MG/DL — SIGNIFICANT CHANGE UP (ref 7–23)
CALCIUM SERPL-MCNC: 8.9 MG/DL — SIGNIFICANT CHANGE UP (ref 8.4–10.5)
CHLORIDE SERPL-SCNC: 104 MMOL/L — SIGNIFICANT CHANGE UP (ref 98–107)
CO2 SERPL-SCNC: 22 MMOL/L — SIGNIFICANT CHANGE UP (ref 22–31)
COVID-19 SPIKE DOMAIN AB INTERP: POSITIVE
COVID-19 SPIKE DOMAIN ANTIBODY RESULT: 8.96 U/ML — HIGH
CREAT SERPL-MCNC: 0.97 MG/DL — SIGNIFICANT CHANGE UP (ref 0.5–1.3)
ESTIMATED AVERAGE GLUCOSE: 126 MG/DL — HIGH (ref 68–114)
GLUCOSE BLDC GLUCOMTR-MCNC: 115 MG/DL — HIGH (ref 70–99)
GLUCOSE BLDC GLUCOMTR-MCNC: 127 MG/DL — HIGH (ref 70–99)
GLUCOSE BLDC GLUCOMTR-MCNC: 134 MG/DL — HIGH (ref 70–99)
GLUCOSE BLDC GLUCOMTR-MCNC: 139 MG/DL — HIGH (ref 70–99)
GLUCOSE SERPL-MCNC: 126 MG/DL — HIGH (ref 70–99)
HCT VFR BLD CALC: 34.7 % — LOW (ref 39–50)
HGB BLD-MCNC: 11.2 G/DL — LOW (ref 13–17)
MAGNESIUM SERPL-MCNC: 2.1 MG/DL — SIGNIFICANT CHANGE UP (ref 1.6–2.6)
MCHC RBC-ENTMCNC: 27.5 PG — SIGNIFICANT CHANGE UP (ref 27–34)
MCHC RBC-ENTMCNC: 32.3 GM/DL — SIGNIFICANT CHANGE UP (ref 32–36)
MCV RBC AUTO: 85 FL — SIGNIFICANT CHANGE UP (ref 80–100)
NRBC # BLD: 0 /100 WBCS — SIGNIFICANT CHANGE UP
NRBC # FLD: 0 K/UL — SIGNIFICANT CHANGE UP
PHOSPHATE SERPL-MCNC: 3.8 MG/DL — SIGNIFICANT CHANGE UP (ref 2.5–4.5)
PLATELET # BLD AUTO: 294 K/UL — SIGNIFICANT CHANGE UP (ref 150–400)
POTASSIUM SERPL-MCNC: 4 MMOL/L — SIGNIFICANT CHANGE UP (ref 3.5–5.3)
POTASSIUM SERPL-SCNC: 4 MMOL/L — SIGNIFICANT CHANGE UP (ref 3.5–5.3)
PROT SERPL-MCNC: 6.4 G/DL — SIGNIFICANT CHANGE UP (ref 6–8.3)
RBC # BLD: 4.08 M/UL — LOW (ref 4.2–5.8)
RBC # FLD: 14.6 % — HIGH (ref 10.3–14.5)
SARS-COV-2 IGG+IGM SERPL QL IA: 8.96 U/ML — HIGH
SARS-COV-2 IGG+IGM SERPL QL IA: POSITIVE
SARS-COV-2 RNA SPEC QL NAA+PROBE: SIGNIFICANT CHANGE UP
SODIUM SERPL-SCNC: 140 MMOL/L — SIGNIFICANT CHANGE UP (ref 135–145)
WBC # BLD: 5.46 K/UL — SIGNIFICANT CHANGE UP (ref 3.8–10.5)
WBC # FLD AUTO: 5.46 K/UL — SIGNIFICANT CHANGE UP (ref 3.8–10.5)

## 2021-06-01 PROCEDURE — ZZZZZ: CPT

## 2021-06-01 PROCEDURE — 93306 TTE W/DOPPLER COMPLETE: CPT | Mod: 26

## 2021-06-01 PROCEDURE — 99231 SBSQ HOSP IP/OBS SF/LOW 25: CPT | Mod: GC

## 2021-06-01 RX ORDER — ASPIRIN/CALCIUM CARB/MAGNESIUM 324 MG
81 TABLET ORAL DAILY
Refills: 0 | Status: DISCONTINUED | OUTPATIENT
Start: 2021-06-01 | End: 2021-06-03

## 2021-06-01 RX ORDER — INSULIN LISPRO 100/ML
VIAL (ML) SUBCUTANEOUS
Refills: 0 | Status: DISCONTINUED | OUTPATIENT
Start: 2021-06-01 | End: 2021-06-03

## 2021-06-01 RX ORDER — INSULIN LISPRO 100/ML
VIAL (ML) SUBCUTANEOUS AT BEDTIME
Refills: 0 | Status: DISCONTINUED | OUTPATIENT
Start: 2021-06-01 | End: 2021-06-03

## 2021-06-01 RX ORDER — ASPIRIN/CALCIUM CARB/MAGNESIUM 324 MG
81 TABLET ORAL DAILY
Refills: 0 | Status: DISCONTINUED | OUTPATIENT
Start: 2021-06-01 | End: 2021-06-01

## 2021-06-01 RX ORDER — ASPIRIN/CALCIUM CARB/MAGNESIUM 324 MG
81 TABLET ORAL EVERY 24 HOURS
Refills: 0 | Status: DISCONTINUED | OUTPATIENT
Start: 2021-06-01 | End: 2021-06-01

## 2021-06-01 RX ADMIN — Medication 81 MILLIGRAM(S): at 05:54

## 2021-06-01 RX ADMIN — PANTOPRAZOLE SODIUM 40 MILLIGRAM(S): 20 TABLET, DELAYED RELEASE ORAL at 05:53

## 2021-06-01 RX ADMIN — TAMSULOSIN HYDROCHLORIDE 0.4 MILLIGRAM(S): 0.4 CAPSULE ORAL at 21:53

## 2021-06-01 RX ADMIN — SODIUM CHLORIDE 125 MILLILITER(S): 9 INJECTION, SOLUTION INTRAVENOUS at 17:17

## 2021-06-01 RX ADMIN — SODIUM CHLORIDE 125 MILLILITER(S): 9 INJECTION, SOLUTION INTRAVENOUS at 10:36

## 2021-06-01 RX ADMIN — Medication 50 MILLIGRAM(S): at 17:18

## 2021-06-01 RX ADMIN — Medication 81 MILLIGRAM(S): at 17:18

## 2021-06-01 RX ADMIN — Medication 1000 MILLIGRAM(S): at 10:52

## 2021-06-01 RX ADMIN — PIPERACILLIN AND TAZOBACTAM 25 GRAM(S): 4; .5 INJECTION, POWDER, LYOPHILIZED, FOR SOLUTION INTRAVENOUS at 17:18

## 2021-06-01 RX ADMIN — FINASTERIDE 5 MILLIGRAM(S): 5 TABLET, FILM COATED ORAL at 12:23

## 2021-06-01 RX ADMIN — ESCITALOPRAM OXALATE 5 MILLIGRAM(S): 10 TABLET, FILM COATED ORAL at 17:18

## 2021-06-01 RX ADMIN — LOSARTAN POTASSIUM 100 MILLIGRAM(S): 100 TABLET, FILM COATED ORAL at 05:53

## 2021-06-01 RX ADMIN — Medication 400 MILLIGRAM(S): at 10:37

## 2021-06-01 RX ADMIN — Medication 400 MILLIGRAM(S): at 04:09

## 2021-06-01 RX ADMIN — SODIUM CHLORIDE 125 MILLILITER(S): 9 INJECTION, SOLUTION INTRAVENOUS at 21:53

## 2021-06-01 RX ADMIN — ATORVASTATIN CALCIUM 40 MILLIGRAM(S): 80 TABLET, FILM COATED ORAL at 21:53

## 2021-06-01 RX ADMIN — Medication 300 MILLIGRAM(S): at 12:23

## 2021-06-01 RX ADMIN — Medication 50 MILLIGRAM(S): at 05:54

## 2021-06-01 RX ADMIN — SODIUM CHLORIDE 125 MILLILITER(S): 9 INJECTION, SOLUTION INTRAVENOUS at 05:54

## 2021-06-01 RX ADMIN — PIPERACILLIN AND TAZOBACTAM 25 GRAM(S): 4; .5 INJECTION, POWDER, LYOPHILIZED, FOR SOLUTION INTRAVENOUS at 10:36

## 2021-06-01 RX ADMIN — PIPERACILLIN AND TAZOBACTAM 25 GRAM(S): 4; .5 INJECTION, POWDER, LYOPHILIZED, FOR SOLUTION INTRAVENOUS at 02:13

## 2021-06-01 NOTE — CONSULT NOTE ADULT - ASSESSMENT
Acute Cholecystitis   plan for lap matthew  on anbx  fu with surgery     CAD s/p stents  on asa  statin  can hold plavix for now     HTN  cont current meds    Pre-Operative Cardiac Risk Stratification and Optimization   Based on current ACC/AHA guidelines, patient history and physical exam, the patient is considered to have elevated risk   no evidence of acute MI   no acute CV symptoms  obtain echo to eval LV function

## 2021-06-01 NOTE — CHART NOTE - NSCHARTNOTEFT_GEN_A_CORE
I spoke with the patient and his daughter Patricia with the assistance of phone  Mary (ID #: [phone]) regarding his options for treatment of his acute cholecystitis. I explained our recommendations were for surgical intervention on this admission. I explained that the risk of foregoing surgery includes incomplete treatment, worsening infection, sepsis, need for emergent surgical intervention with higher morbidity and mortality risks, and death. The patient and his daughter verbalized their understanding of these complications and are willing to accept these risks.

## 2021-06-01 NOTE — PROGRESS NOTE ADULT - SUBJECTIVE AND OBJECTIVE BOX
SURGERY DAILY PROGRESS NOTE:     INTERVAL:    SUBJECTIVE/ROS: Patient feels well  Denies nausea, vomiting, chest pain, shortness of breath     MEDICATIONS  (STANDING):  acetaminophen  IVPB .. 1000 milliGRAM(s) IV Intermittent every 6 hours  allopurinol 300 milliGRAM(s) Oral daily  atorvastatin 40 milliGRAM(s) Oral at bedtime  dextrose 40% Gel 15 Gram(s) Oral once  dextrose 5% + sodium chloride 0.45%. 1000 milliLiter(s) (125 mL/Hr) IV Continuous <Continuous>  dextrose 5%. 1000 milliLiter(s) (50 mL/Hr) IV Continuous <Continuous>  dextrose 5%. 1000 milliLiter(s) (100 mL/Hr) IV Continuous <Continuous>  dextrose 50% Injectable 25 Gram(s) IV Push once  dextrose 50% Injectable 12.5 Gram(s) IV Push once  dextrose 50% Injectable 25 Gram(s) IV Push once  enoxaparin Injectable 40 milliGRAM(s) SubCutaneous daily  escitalopram 5 milliGRAM(s) Oral daily  finasteride 5 milliGRAM(s) Oral daily  glucagon  Injectable 1 milliGRAM(s) IntraMuscular once  insulin lispro (ADMELOG) corrective regimen sliding scale   SubCutaneous every 6 hours  losartan 100 milliGRAM(s) Oral daily  metoprolol tartrate 50 milliGRAM(s) Oral two times a day  pantoprazole    Tablet 40 milliGRAM(s) Oral before breakfast  piperacillin/tazobactam IVPB.. 3.375 Gram(s) IV Intermittent every 8 hours  tamsulosin 0.4 milliGRAM(s) Oral at bedtime    MEDICATIONS  (PRN):  ondansetron Injectable 4 milliGRAM(s) IV Push once PRN Nausea and/or Vomiting      OBJECTIVE:    Vital Signs Last 24 Hrs  T(C): 36.9 (31 May 2021 21:44), Max: 37 (31 May 2021 03:33)  T(F): 98.4 (31 May 2021 21:44), Max: 98.6 (31 May 2021 03:33)  HR: 78 (31 May 2021 21:44) (66 - 88)  BP: 134/74 (31 May 2021 21:44) (132/82 - 159/75)  BP(mean): --  RR: 18 (31 May 2021 21:44) (16 - 20)  SpO2: 100% (31 May 2021 21:44) (97% - 100%)    I&O's Detail    31 May 2021 07:01  -  2021 01:52  --------------------------------------------------------  IN:    dextrose 5% + sodium chloride 0.45%: 500 mL    IV PiggyBack: 300 mL    Lactated Ringers: 1000 mL    Oral Fluid: 30 mL  Total IN: 1830 mL    OUT:    Estimated Blood Loss (mL): 0 mL    Voided (mL): 1600 mL  Total OUT: 1600 mL    Total NET: 230 mL          Daily     Daily     LABS:                        12.2   8.06  )-----------( 298      ( 31 May 2021 06:58 )             39.3     05    142  |  106  |  24<H>  ----------------------------<  113<H>  4.5   |  24  |  1.03    Ca    9.7      31 May 2021 06:58  Phos  4.2       Mg     2.1         TPro  6.7  /  Alb  3.8  /  TBili  0.8  /  DBili  x   /  AST  15  /  ALT  19  /  AlkPhos  98  05-      Urinalysis Basic - ( 30 May 2021 19:48 )    Color: Light Yellow / Appearance: Clear / S.028 / pH: x  Gluc: x / Ketone: Negative  / Bili: Negative / Urobili: <2 mg/dL   Blood: x / Protein: Negative / Nitrite: Negative   Leuk Esterase: Negative / RBC: x / WBC x   Sq Epi: x / Non Sq Epi: x / Bacteria: x      Physical Exam: General: No Acute Distress.  Neuro: Alert and oriented, no focal deficits, moves all extremities spontaneously.  Respiratory: Airway patent, respirations unlabored.  Cardiovascular: Pulse present.   Abdomen: Large, soft, tender in RUQ with +Escalante's, nondistended.  Genitourinary: No obvious lesions.  Extremities: Warm, moves all four.  Musculoskeletal: No tenderness of extremities, mobile joints.   Integumentary: No obvious lesions.    Mr. Zimmerman is a 70 Year-Old Gentleman with history of CAD/MI status post stent placed in 2021 on Plavix, gout, HTN, HLD, presenting with 3 days of RUQ pain, found to have cholelithiases.     - Will plan for laparoscopic cholecystectomy this admission, however patient currently on Plavix for recent cardiac stent placement, last taken 21 AM.   - Nil Per Os, Intravenous Fluid Resuscitation Therapy.   - Intravenous antibiotic administration: Zosyn.  - Will obtain Cardiology consult (Dr. Ovalle) in AM for operative clearance, home cardiologist (Dr. Campuzano [facility])    B Team Surgery Pager #[phone]    SURGERY DAILY PROGRESS NOTE:     INTERVAL:    SUBJECTIVE/ROS: Patient seen and examined at bedside.      MEDICATIONS  (STANDING):  acetaminophen  IVPB .. 1000 milliGRAM(s) IV Intermittent every 6 hours  allopurinol 300 milliGRAM(s) Oral daily  atorvastatin 40 milliGRAM(s) Oral at bedtime  dextrose 40% Gel 15 Gram(s) Oral once  dextrose 5% + sodium chloride 0.45%. 1000 milliLiter(s) (125 mL/Hr) IV Continuous <Continuous>  dextrose 5%. 1000 milliLiter(s) (50 mL/Hr) IV Continuous <Continuous>  dextrose 5%. 1000 milliLiter(s) (100 mL/Hr) IV Continuous <Continuous>  dextrose 50% Injectable 25 Gram(s) IV Push once  dextrose 50% Injectable 12.5 Gram(s) IV Push once  dextrose 50% Injectable 25 Gram(s) IV Push once  enoxaparin Injectable 40 milliGRAM(s) SubCutaneous daily  escitalopram 5 milliGRAM(s) Oral daily  finasteride 5 milliGRAM(s) Oral daily  glucagon  Injectable 1 milliGRAM(s) IntraMuscular once  insulin lispro (ADMELOG) corrective regimen sliding scale   SubCutaneous every 6 hours  losartan 100 milliGRAM(s) Oral daily  metoprolol tartrate 50 milliGRAM(s) Oral two times a day  pantoprazole    Tablet 40 milliGRAM(s) Oral before breakfast  piperacillin/tazobactam IVPB.. 3.375 Gram(s) IV Intermittent every 8 hours  tamsulosin 0.4 milliGRAM(s) Oral at bedtime    MEDICATIONS  (PRN):  ondansetron Injectable 4 milliGRAM(s) IV Push once PRN Nausea and/or Vomiting      OBJECTIVE:    Vital Signs Last 24 Hrs  T(C): 36.9 (31 May 2021 21:44), Max: 37 (31 May 2021 03:33)  T(F): 98.4 (31 May 2021 21:44), Max: 98.6 (31 May 2021 03:33)  HR: 78 (31 May 2021 21:44) (66 - 88)  BP: 134/74 (31 May 2021 21:44) (132/82 - 159/75)  BP(mean): --  RR: 18 (31 May 2021 21:44) (16 - 20)  SpO2: 100% (31 May 2021 21:44) (97% - 100%)    I&O's Detail    31 May 2021 07:01  -  2021 01:52  --------------------------------------------------------  IN:    dextrose 5% + sodium chloride 0.45%: 500 mL    IV PiggyBack: 300 mL    Lactated Ringers: 1000 mL    Oral Fluid: 30 mL  Total IN: 1830 mL    OUT:    Estimated Blood Loss (mL): 0 mL    Voided (mL): 1600 mL  Total OUT: 1600 mL    Total NET: 230 mL          Daily     Daily     LABS:                        12.2   8.06  )-----------( 298      ( 31 May 2021 06:58 )             39.3     -    142  |  106  |  24<H>  ----------------------------<  113<H>  4.5   |  24  |  1.03    Ca    9.7      31 May 2021 06:58  Phos  4.2       Mg     2.1         TPro  6.7  /  Alb  3.8  /  TBili  0.8  /  DBili  x   /  AST  15  /  ALT  19  /  AlkPhos  98        Urinalysis Basic - ( 30 May 2021 19:48 )    Color: Light Yellow / Appearance: Clear / S.028 / pH: x  Gluc: x / Ketone: Negative  / Bili: Negative / Urobili: <2 mg/dL   Blood: x / Protein: Negative / Nitrite: Negative   Leuk Esterase: Negative / RBC: x / WBC x   Sq Epi: x / Non Sq Epi: x / Bacteria: x      Physical Exam: General: No Acute Distress.  Respiratory: Airway patent, respirations unlabored.  Abdomen: Large, soft, tender in RUQ with +Escalante's, nondistended.  Extremities: Warm, moves all four.  Musculoskeletal: No tenderness of extremities, mobile joints.

## 2021-06-02 LAB
ALBUMIN SERPL ELPH-MCNC: 3.8 G/DL — SIGNIFICANT CHANGE UP (ref 3.3–5)
ALP SERPL-CCNC: 86 U/L — SIGNIFICANT CHANGE UP (ref 40–120)
ALT FLD-CCNC: 19 U/L — SIGNIFICANT CHANGE UP (ref 4–41)
ANION GAP SERPL CALC-SCNC: 12 MMOL/L — SIGNIFICANT CHANGE UP (ref 7–14)
APTT BLD: 34.5 SEC — SIGNIFICANT CHANGE UP (ref 27–36.3)
AST SERPL-CCNC: 17 U/L — SIGNIFICANT CHANGE UP (ref 4–40)
BILIRUB SERPL-MCNC: 1.3 MG/DL — HIGH (ref 0.2–1.2)
BLD GP AB SCN SERPL QL: NEGATIVE — SIGNIFICANT CHANGE UP
BUN SERPL-MCNC: 10 MG/DL — SIGNIFICANT CHANGE UP (ref 7–23)
CALCIUM SERPL-MCNC: 9.2 MG/DL — SIGNIFICANT CHANGE UP (ref 8.4–10.5)
CHLORIDE SERPL-SCNC: 107 MMOL/L — SIGNIFICANT CHANGE UP (ref 98–107)
CO2 SERPL-SCNC: 23 MMOL/L — SIGNIFICANT CHANGE UP (ref 22–31)
CREAT SERPL-MCNC: 1.04 MG/DL — SIGNIFICANT CHANGE UP (ref 0.5–1.3)
CRP SERPL-MCNC: 6.5 MG/L — HIGH
GLUCOSE BLDC GLUCOMTR-MCNC: 103 MG/DL — HIGH (ref 70–99)
GLUCOSE BLDC GLUCOMTR-MCNC: 123 MG/DL — HIGH (ref 70–99)
GLUCOSE BLDC GLUCOMTR-MCNC: 126 MG/DL — HIGH (ref 70–99)
GLUCOSE BLDC GLUCOMTR-MCNC: 95 MG/DL — SIGNIFICANT CHANGE UP (ref 70–99)
GLUCOSE SERPL-MCNC: 143 MG/DL — HIGH (ref 70–99)
HCT VFR BLD CALC: 35.5 % — LOW (ref 39–50)
HGB BLD-MCNC: 11.2 G/DL — LOW (ref 13–17)
INR BLD: 1.11 RATIO — SIGNIFICANT CHANGE UP (ref 0.88–1.16)
MAGNESIUM SERPL-MCNC: 2.1 MG/DL — SIGNIFICANT CHANGE UP (ref 1.6–2.6)
MCHC RBC-ENTMCNC: 27 PG — SIGNIFICANT CHANGE UP (ref 27–34)
MCHC RBC-ENTMCNC: 31.5 GM/DL — LOW (ref 32–36)
MCV RBC AUTO: 85.5 FL — SIGNIFICANT CHANGE UP (ref 80–100)
NRBC # BLD: 0 /100 WBCS — SIGNIFICANT CHANGE UP
NRBC # FLD: 0 K/UL — SIGNIFICANT CHANGE UP
PHOSPHATE SERPL-MCNC: 3.6 MG/DL — SIGNIFICANT CHANGE UP (ref 2.5–4.5)
PLATELET # BLD AUTO: 282 K/UL — SIGNIFICANT CHANGE UP (ref 150–400)
POTASSIUM SERPL-MCNC: 3.8 MMOL/L — SIGNIFICANT CHANGE UP (ref 3.5–5.3)
POTASSIUM SERPL-SCNC: 3.8 MMOL/L — SIGNIFICANT CHANGE UP (ref 3.5–5.3)
PROT SERPL-MCNC: 6.6 G/DL — SIGNIFICANT CHANGE UP (ref 6–8.3)
PROTHROM AB SERPL-ACNC: 12.7 SEC — SIGNIFICANT CHANGE UP (ref 10.6–13.6)
RBC # BLD: 4.15 M/UL — LOW (ref 4.2–5.8)
RBC # FLD: 14.5 % — SIGNIFICANT CHANGE UP (ref 10.3–14.5)
RH IG SCN BLD-IMP: POSITIVE — SIGNIFICANT CHANGE UP
SODIUM SERPL-SCNC: 142 MMOL/L — SIGNIFICANT CHANGE UP (ref 135–145)
WBC # BLD: 6.57 K/UL — SIGNIFICANT CHANGE UP (ref 3.8–10.5)
WBC # FLD AUTO: 6.57 K/UL — SIGNIFICANT CHANGE UP (ref 3.8–10.5)

## 2021-06-02 PROCEDURE — 78226 HEPATOBILIARY SYSTEM IMAGING: CPT | Mod: 26,GC

## 2021-06-02 RX ORDER — OXYCODONE HYDROCHLORIDE 5 MG/1
5 TABLET ORAL ONCE
Refills: 0 | Status: DISCONTINUED | OUTPATIENT
Start: 2021-06-02 | End: 2021-06-02

## 2021-06-02 RX ORDER — ACETAMINOPHEN 500 MG
975 TABLET ORAL EVERY 6 HOURS
Refills: 0 | Status: DISCONTINUED | OUTPATIENT
Start: 2021-06-02 | End: 2021-06-04

## 2021-06-02 RX ORDER — CLOPIDOGREL BISULFATE 75 MG/1
75 TABLET, FILM COATED ORAL DAILY
Refills: 0 | Status: DISCONTINUED | OUTPATIENT
Start: 2021-06-02 | End: 2021-06-04

## 2021-06-02 RX ORDER — POTASSIUM CHLORIDE 20 MEQ
20 PACKET (EA) ORAL ONCE
Refills: 0 | Status: COMPLETED | OUTPATIENT
Start: 2021-06-02 | End: 2021-06-02

## 2021-06-02 RX ORDER — URSODIOL 250 MG/1
500 TABLET, FILM COATED ORAL
Refills: 0 | Status: DISCONTINUED | OUTPATIENT
Start: 2021-06-02 | End: 2021-06-04

## 2021-06-02 RX ORDER — ENOXAPARIN SODIUM 100 MG/ML
40 INJECTION SUBCUTANEOUS EVERY 24 HOURS
Refills: 0 | Status: DISCONTINUED | OUTPATIENT
Start: 2021-06-02 | End: 2021-06-02

## 2021-06-02 RX ORDER — CLOPIDOGREL BISULFATE 75 MG/1
75 TABLET, FILM COATED ORAL DAILY
Refills: 0 | Status: DISCONTINUED | OUTPATIENT
Start: 2021-06-02 | End: 2021-06-02

## 2021-06-02 RX ORDER — ENOXAPARIN SODIUM 100 MG/ML
40 INJECTION SUBCUTANEOUS EVERY 24 HOURS
Refills: 0 | Status: DISCONTINUED | OUTPATIENT
Start: 2021-06-02 | End: 2021-06-04

## 2021-06-02 RX ADMIN — Medication 975 MILLIGRAM(S): at 21:15

## 2021-06-02 RX ADMIN — ESCITALOPRAM OXALATE 5 MILLIGRAM(S): 10 TABLET, FILM COATED ORAL at 12:15

## 2021-06-02 RX ADMIN — SODIUM CHLORIDE 125 MILLILITER(S): 9 INJECTION, SOLUTION INTRAVENOUS at 12:14

## 2021-06-02 RX ADMIN — Medication 975 MILLIGRAM(S): at 20:21

## 2021-06-02 RX ADMIN — Medication 300 MILLIGRAM(S): at 12:15

## 2021-06-02 RX ADMIN — PANTOPRAZOLE SODIUM 40 MILLIGRAM(S): 20 TABLET, DELAYED RELEASE ORAL at 07:21

## 2021-06-02 RX ADMIN — Medication 20 MILLIEQUIVALENT(S): at 12:14

## 2021-06-02 RX ADMIN — PIPERACILLIN AND TAZOBACTAM 25 GRAM(S): 4; .5 INJECTION, POWDER, LYOPHILIZED, FOR SOLUTION INTRAVENOUS at 02:04

## 2021-06-02 RX ADMIN — TAMSULOSIN HYDROCHLORIDE 0.4 MILLIGRAM(S): 0.4 CAPSULE ORAL at 21:35

## 2021-06-02 RX ADMIN — OXYCODONE HYDROCHLORIDE 5 MILLIGRAM(S): 5 TABLET ORAL at 05:56

## 2021-06-02 RX ADMIN — OXYCODONE HYDROCHLORIDE 5 MILLIGRAM(S): 5 TABLET ORAL at 07:00

## 2021-06-02 RX ADMIN — Medication 50 MILLIGRAM(S): at 05:56

## 2021-06-02 RX ADMIN — CLOPIDOGREL BISULFATE 75 MILLIGRAM(S): 75 TABLET, FILM COATED ORAL at 17:10

## 2021-06-02 RX ADMIN — Medication 50 MILLIGRAM(S): at 17:10

## 2021-06-02 RX ADMIN — ENOXAPARIN SODIUM 40 MILLIGRAM(S): 100 INJECTION SUBCUTANEOUS at 18:43

## 2021-06-02 RX ADMIN — FINASTERIDE 5 MILLIGRAM(S): 5 TABLET, FILM COATED ORAL at 12:15

## 2021-06-02 RX ADMIN — ATORVASTATIN CALCIUM 40 MILLIGRAM(S): 80 TABLET, FILM COATED ORAL at 21:35

## 2021-06-02 RX ADMIN — LOSARTAN POTASSIUM 100 MILLIGRAM(S): 100 TABLET, FILM COATED ORAL at 05:57

## 2021-06-02 RX ADMIN — PIPERACILLIN AND TAZOBACTAM 25 GRAM(S): 4; .5 INJECTION, POWDER, LYOPHILIZED, FOR SOLUTION INTRAVENOUS at 12:14

## 2021-06-02 RX ADMIN — Medication 81 MILLIGRAM(S): at 12:15

## 2021-06-02 RX ADMIN — URSODIOL 500 MILLIGRAM(S): 250 TABLET, FILM COATED ORAL at 18:44

## 2021-06-02 NOTE — PROGRESS NOTE ADULT - SUBJECTIVE AND OBJECTIVE BOX
SURGERY DAILY PROGRESS NOTE:     SUBJECTIVE/ROS: Patient feels well  Denies nausea, vomiting, chest pain, shortness of breath     MEDICATIONS  (STANDING):  allopurinol 300 milliGRAM(s) Oral daily  aspirin  chewable 81 milliGRAM(s) Oral daily  atorvastatin 40 milliGRAM(s) Oral at bedtime  dextrose 40% Gel 15 Gram(s) Oral once  dextrose 5% + sodium chloride 0.45%. 1000 milliLiter(s) (125 mL/Hr) IV Continuous <Continuous>  dextrose 5%. 1000 milliLiter(s) (50 mL/Hr) IV Continuous <Continuous>  dextrose 5%. 1000 milliLiter(s) (100 mL/Hr) IV Continuous <Continuous>  dextrose 50% Injectable 25 Gram(s) IV Push once  dextrose 50% Injectable 25 Gram(s) IV Push once  dextrose 50% Injectable 12.5 Gram(s) IV Push once  escitalopram 5 milliGRAM(s) Oral daily  finasteride 5 milliGRAM(s) Oral daily  glucagon  Injectable 1 milliGRAM(s) IntraMuscular once  insulin lispro (ADMELOG) corrective regimen sliding scale   SubCutaneous three times a day before meals  insulin lispro (ADMELOG) corrective regimen sliding scale   SubCutaneous at bedtime  losartan 100 milliGRAM(s) Oral daily  metoprolol tartrate 50 milliGRAM(s) Oral two times a day  pantoprazole    Tablet 40 milliGRAM(s) Oral before breakfast  piperacillin/tazobactam IVPB.. 3.375 Gram(s) IV Intermittent every 8 hours  tamsulosin 0.4 milliGRAM(s) Oral at bedtime    MEDICATIONS  (PRN):  ondansetron Injectable 4 milliGRAM(s) IV Push once PRN Nausea and/or Vomiting      OBJECTIVE:    Vital Signs Last 24 Hrs  T(C): 36.4 (01 Jun 2021 22:25), Max: 36.7 (01 Jun 2021 05:30)  T(F): 97.5 (01 Jun 2021 22:25), Max: 98.1 (01 Jun 2021 05:30)  HR: 69 (01 Jun 2021 22:25) (60 - 73)  BP: 125/71 (01 Jun 2021 22:25) (125/71 - 179/90)  BP(mean): --  RR: 18 (01 Jun 2021 22:25) (16 - 19)  SpO2: 98% (01 Jun 2021 22:25) (95% - 100%)    I&O's Detail    31 May 2021 07:01  -  01 Jun 2021 07:00  --------------------------------------------------------  IN:    dextrose 5% + sodium chloride 0.45%: 1500 mL    IV PiggyBack: 400 mL    Lactated Ringers: 1000 mL    Oral Fluid: 60 mL  Total IN: 2960 mL    OUT:    Estimated Blood Loss (mL): 0 mL    Voided (mL): 2600 mL  Total OUT: 2600 mL    Total NET: 360 mL      01 Jun 2021 07:01  -  02 Jun 2021 01:11  --------------------------------------------------------  IN:    dextrose 5% + sodium chloride 0.45%: 1500 mL    IV PiggyBack: 300 mL    Oral Fluid: 50 mL  Total IN: 1850 mL    OUT:    Voided (mL): 1225 mL  Total OUT: 1225 mL    Total NET: 625 mL          Daily     Daily     LABS:                        11.2   5.46  )-----------( 294      ( 01 Jun 2021 06:56 )             34.7     06-01    140  |  104  |  16  ----------------------------<  126<H>  4.0   |  22  |  0.97    Ca    8.9      01 Jun 2021 06:56  Phos  3.8     06-01  Mg     2.1     06-01    TPro  6.4  /  Alb  3.7  /  TBili  1.3<H>  /  DBili  x   /  AST  19  /  ALT  18  /  AlkPhos  87  06-01        Physical Exam: General: No Acute Distress.  Respiratory: Airway patent, respirations unlabored.  Abdomen: Large, soft, tender in RUQ with +Escalante's, nondistended.  Extremities: Warm, moves all four.  Musculoskeletal: No tenderness of extremities, mobile joints.     Mr. Zimmerman is a 70 Year-Old Gentleman with history of CAD/MI status post stent placed in 01/2021 on Plavix, gout, HTN, HLD, presenting with 3 days of RUQ pain, admitted with likely acute cholecystitis.     Plan:  - IR consult for perc matthew  - Plavix for recent cardiac stent placement, last taken 5/30/21 AM.   - Nil Per Os, Intravenous Fluid Resuscitation Therapy.   - Intravenous antibiotic administration: Zosyn.  - Will obtain Cardiology consult (Dr. Ovalle) for clearance, home cardiologist (Dr. Campuzano [facility])    B Team Surgery Pager #[phone]  SURGERY DAILY PROGRESS NOTE:     SUBJECTIVE/ROS: Reports worsened pain after PO yesterday. No N/V.      MEDICATIONS  (STANDING):  allopurinol 300 milliGRAM(s) Oral daily  aspirin  chewable 81 milliGRAM(s) Oral daily  atorvastatin 40 milliGRAM(s) Oral at bedtime  dextrose 40% Gel 15 Gram(s) Oral once  dextrose 5% + sodium chloride 0.45%. 1000 milliLiter(s) (125 mL/Hr) IV Continuous <Continuous>  dextrose 5%. 1000 milliLiter(s) (50 mL/Hr) IV Continuous <Continuous>  dextrose 5%. 1000 milliLiter(s) (100 mL/Hr) IV Continuous <Continuous>  dextrose 50% Injectable 25 Gram(s) IV Push once  dextrose 50% Injectable 25 Gram(s) IV Push once  dextrose 50% Injectable 12.5 Gram(s) IV Push once  escitalopram 5 milliGRAM(s) Oral daily  finasteride 5 milliGRAM(s) Oral daily  glucagon  Injectable 1 milliGRAM(s) IntraMuscular once  insulin lispro (ADMELOG) corrective regimen sliding scale   SubCutaneous three times a day before meals  insulin lispro (ADMELOG) corrective regimen sliding scale   SubCutaneous at bedtime  losartan 100 milliGRAM(s) Oral daily  metoprolol tartrate 50 milliGRAM(s) Oral two times a day  pantoprazole    Tablet 40 milliGRAM(s) Oral before breakfast  piperacillin/tazobactam IVPB.. 3.375 Gram(s) IV Intermittent every 8 hours  tamsulosin 0.4 milliGRAM(s) Oral at bedtime    MEDICATIONS  (PRN):  ondansetron Injectable 4 milliGRAM(s) IV Push once PRN Nausea and/or Vomiting      OBJECTIVE:  Vital Signs Last 24 Hrs  T(C): 36.4 (02 Jun 2021 05:52), Max: 36.7 (01 Jun 2021 15:02)  T(F): 97.5 (02 Jun 2021 05:52), Max: 98.1 (01 Jun 2021 15:02)  HR: 82 (02 Jun 2021 05:52) (63 - 82)  BP: 141/64 (02 Jun 2021 05:52) (125/71 - 153/77)  BP(mean): --  RR: 18 (02 Jun 2021 05:52) (16 - 18)  SpO2: 97% (02 Jun 2021 05:52) (97% - 100%)      I&O's Summary    01 Jun 2021 07:01  -  02 Jun 2021 07:00  --------------------------------------------------------  IN: 2950 mL / OUT: 2650 mL / NET: 300 mL        Physical Exam: General: No Acute Distress.  Respiratory: Airway patent, respirations unlabored.  Abdomen: Large, soft, tender in RUQ with +Escalante's, nondistended.  Extremities: Warm, moves all four.  Musculoskeletal: No tenderness of extremities, mobile joints.         LABS:                          11.2   6.57  )-----------( 282      ( 02 Jun 2021 06:25 )             35.5     06-02    142  |  107  |  10  ----------------------------<  143<H>  3.8   |  23  |  1.04    Ca    9.2      02 Jun 2021 06:25  Phos  3.6     06-02  Mg     2.1     06-02    TPro  6.6  /  Alb  3.8  /  TBili  1.3<H>  /  DBili  x   /  AST  17  /  ALT  19  /  AlkPhos  86  06-02    PT/INR - ( 02 Jun 2021 06:25 )   PT: 12.7 sec;   INR: 1.11 ratio         PTT - ( 02 Jun 2021 06:25 )  PTT:34.5 sec        A/P:  Mr. Zimmerman is a 70 Year-Old Gentleman with history of CAD/MI status post stent placed in 01/2021 on Plavix, gout, HTN, HLD, presenting with 3 days of RUQ pain, admitted with likely acute cholecystitis.     Plan:  - Make NPO/IVF  - Cont zosyn  - IR consult for perc matthew  - HIDA scan  - Cards: elevated cardiac risk for lap matthew, can hold plavix    B Team Surgery   [id number]

## 2021-06-02 NOTE — PROGRESS NOTE ADULT - SUBJECTIVE AND OBJECTIVE BOX
DATE OF SERVICE: 06-02-21 @ 07:05    Subjective: Patient seen and examined. No new events except as noted.     SUBJECTIVE/ROS:  feels ok       MEDICATIONS:  MEDICATIONS  (STANDING):  acetaminophen   Tablet .. 975 milliGRAM(s) Oral every 6 hours  allopurinol 300 milliGRAM(s) Oral daily  aspirin  chewable 81 milliGRAM(s) Oral daily  atorvastatin 40 milliGRAM(s) Oral at bedtime  dextrose 40% Gel 15 Gram(s) Oral once  dextrose 5% + sodium chloride 0.45%. 1000 milliLiter(s) (125 mL/Hr) IV Continuous <Continuous>  dextrose 5%. 1000 milliLiter(s) (50 mL/Hr) IV Continuous <Continuous>  dextrose 5%. 1000 milliLiter(s) (100 mL/Hr) IV Continuous <Continuous>  dextrose 50% Injectable 25 Gram(s) IV Push once  dextrose 50% Injectable 12.5 Gram(s) IV Push once  dextrose 50% Injectable 25 Gram(s) IV Push once  escitalopram 5 milliGRAM(s) Oral daily  finasteride 5 milliGRAM(s) Oral daily  glucagon  Injectable 1 milliGRAM(s) IntraMuscular once  insulin lispro (ADMELOG) corrective regimen sliding scale   SubCutaneous three times a day before meals  insulin lispro (ADMELOG) corrective regimen sliding scale   SubCutaneous at bedtime  losartan 100 milliGRAM(s) Oral daily  metoprolol tartrate 50 milliGRAM(s) Oral two times a day  pantoprazole    Tablet 40 milliGRAM(s) Oral before breakfast  piperacillin/tazobactam IVPB.. 3.375 Gram(s) IV Intermittent every 8 hours  tamsulosin 0.4 milliGRAM(s) Oral at bedtime      PHYSICAL EXAM:  T(C): 36.4 (06-02-21 @ 05:52), Max: 36.7 (06-01-21 @ 15:02)  HR: 82 (06-02-21 @ 05:52) (63 - 82)  BP: 141/64 (06-02-21 @ 05:52) (125/71 - 153/77)  RR: 18 (06-02-21 @ 05:52) (16 - 18)  SpO2: 97% (06-02-21 @ 05:52) (97% - 100%)  Wt(kg): --  I&O's Summary    01 Jun 2021 07:01  -  02 Jun 2021 07:00  --------------------------------------------------------  IN: 2950 mL / OUT: 2650 mL / NET: 300 mL            JVP: Normal  Neck: supple  Lung: clear   CV: S1 S2 , Murmur:  Abd: soft  Ext: No edema  neuro: Awake / alert  Psych: flat affect  Skin: normal``    LABS/DATA:    CARDIAC MARKERS:                                11.2   6.57  )-----------( 282      ( 02 Jun 2021 06:25 )             35.5     06-01    140  |  104  |  16  ----------------------------<  126<H>  4.0   |  22  |  0.97    Ca    8.9      01 Jun 2021 06:56  Phos  3.8     06-01  Mg     2.1     06-01    TPro  6.4  /  Alb  3.7  /  TBili  1.3<H>  /  DBili  x   /  AST  19  /  ALT  18  /  AlkPhos  87  06-01    proBNP:   Lipid Profile:   HgA1c:   TSH:     TELE:  EKG:    < from: Transthoracic Echocardiogram (06.01.21 @ 13:14) >  CONCLUSIONS:  Technically difficult study.  1. Mitral annular calcification, otherwise normal mitral  valve. Minimal mitral regurgitation.  2. Endocardium not well visualized; unable to evaluate left  ventricular systolic function.  3. The right ventricle is not well visualized; grossly  normal right ventricular systolic function.  Consider limited repeat imaging with the intravenous  administration of ultrasound enhancing agent for improved  evaluation of LV systolic function, if clinically  indicated.  ------------------------------------------------------------------------  Confirmed on  6/1/2021 - 14:27:13 by Ralph Nolasco M.D.,  [facility], [facility]  ------------------------------------------------------------------------    < end of copied text >

## 2021-06-03 LAB
ALBUMIN SERPL ELPH-MCNC: 4.5 G/DL — SIGNIFICANT CHANGE UP (ref 3.3–5)
ALP SERPL-CCNC: 93 U/L — SIGNIFICANT CHANGE UP (ref 40–120)
ALT FLD-CCNC: 26 U/L — SIGNIFICANT CHANGE UP (ref 4–41)
ANION GAP SERPL CALC-SCNC: 14 MMOL/L — SIGNIFICANT CHANGE UP (ref 7–14)
AST SERPL-CCNC: 25 U/L — SIGNIFICANT CHANGE UP (ref 4–40)
BILIRUB DIRECT SERPL-MCNC: 0.2 MG/DL — SIGNIFICANT CHANGE UP (ref 0–0.2)
BILIRUB INDIRECT FLD-MCNC: 1 MG/DL — SIGNIFICANT CHANGE UP (ref 0–1)
BILIRUB SERPL-MCNC: 1.2 MG/DL — SIGNIFICANT CHANGE UP (ref 0.2–1.2)
BUN SERPL-MCNC: 10 MG/DL — SIGNIFICANT CHANGE UP (ref 7–23)
CALCIUM SERPL-MCNC: 9 MG/DL — SIGNIFICANT CHANGE UP (ref 8.4–10.5)
CHLORIDE SERPL-SCNC: 106 MMOL/L — SIGNIFICANT CHANGE UP (ref 98–107)
CO2 SERPL-SCNC: 23 MMOL/L — SIGNIFICANT CHANGE UP (ref 22–31)
CREAT SERPL-MCNC: 0.99 MG/DL — SIGNIFICANT CHANGE UP (ref 0.5–1.3)
GLUCOSE BLDC GLUCOMTR-MCNC: 105 MG/DL — HIGH (ref 70–99)
GLUCOSE BLDC GLUCOMTR-MCNC: 111 MG/DL — HIGH (ref 70–99)
GLUCOSE BLDC GLUCOMTR-MCNC: 182 MG/DL — HIGH (ref 70–99)
GLUCOSE BLDC GLUCOMTR-MCNC: 98 MG/DL — SIGNIFICANT CHANGE UP (ref 70–99)
GLUCOSE SERPL-MCNC: 114 MG/DL — HIGH (ref 70–99)
HCT VFR BLD CALC: 38.6 % — LOW (ref 39–50)
HGB BLD-MCNC: 12.2 G/DL — LOW (ref 13–17)
MAGNESIUM SERPL-MCNC: 2.1 MG/DL — SIGNIFICANT CHANGE UP (ref 1.6–2.6)
MCHC RBC-ENTMCNC: 27.2 PG — SIGNIFICANT CHANGE UP (ref 27–34)
MCHC RBC-ENTMCNC: 31.6 GM/DL — LOW (ref 32–36)
MCV RBC AUTO: 86.2 FL — SIGNIFICANT CHANGE UP (ref 80–100)
NRBC # BLD: 0 /100 WBCS — SIGNIFICANT CHANGE UP
NRBC # FLD: 0 K/UL — SIGNIFICANT CHANGE UP
PHOSPHATE SERPL-MCNC: 3 MG/DL — SIGNIFICANT CHANGE UP (ref 2.5–4.5)
PLATELET # BLD AUTO: 341 K/UL — SIGNIFICANT CHANGE UP (ref 150–400)
POTASSIUM SERPL-MCNC: 3.9 MMOL/L — SIGNIFICANT CHANGE UP (ref 3.5–5.3)
POTASSIUM SERPL-SCNC: 3.9 MMOL/L — SIGNIFICANT CHANGE UP (ref 3.5–5.3)
PROT SERPL-MCNC: 7.2 G/DL — SIGNIFICANT CHANGE UP (ref 6–8.3)
RBC # BLD: 4.48 M/UL — SIGNIFICANT CHANGE UP (ref 4.2–5.8)
RBC # FLD: 14.5 % — SIGNIFICANT CHANGE UP (ref 10.3–14.5)
SODIUM SERPL-SCNC: 143 MMOL/L — SIGNIFICANT CHANGE UP (ref 135–145)
WBC # BLD: 6.7 K/UL — SIGNIFICANT CHANGE UP (ref 3.8–10.5)
WBC # FLD AUTO: 6.7 K/UL — SIGNIFICANT CHANGE UP (ref 3.8–10.5)

## 2021-06-03 PROCEDURE — 99222 1ST HOSP IP/OBS MODERATE 55: CPT | Mod: GC,25

## 2021-06-03 PROCEDURE — 99231 SBSQ HOSP IP/OBS SF/LOW 25: CPT | Mod: GC

## 2021-06-03 PROCEDURE — 43235 EGD DIAGNOSTIC BRUSH WASH: CPT | Mod: GC

## 2021-06-03 RX ORDER — SUCRALFATE 1 G
1 TABLET ORAL
Refills: 0 | Status: DISCONTINUED | OUTPATIENT
Start: 2021-06-03 | End: 2021-06-04

## 2021-06-03 RX ORDER — ASPIRIN/CALCIUM CARB/MAGNESIUM 324 MG
81 TABLET ORAL DAILY
Refills: 0 | Status: DISCONTINUED | OUTPATIENT
Start: 2021-06-03 | End: 2021-06-04

## 2021-06-03 RX ORDER — SODIUM CHLORIDE 9 MG/ML
1000 INJECTION, SOLUTION INTRAVENOUS
Refills: 0 | Status: DISCONTINUED | OUTPATIENT
Start: 2021-06-03 | End: 2021-06-03

## 2021-06-03 RX ORDER — IBUPROFEN 200 MG
400 TABLET ORAL EVERY 6 HOURS
Refills: 0 | Status: DISCONTINUED | OUTPATIENT
Start: 2021-06-03 | End: 2021-06-04

## 2021-06-03 RX ORDER — INSULIN LISPRO 100/ML
VIAL (ML) SUBCUTANEOUS
Refills: 0 | Status: DISCONTINUED | OUTPATIENT
Start: 2021-06-03 | End: 2021-06-04

## 2021-06-03 RX ORDER — INSULIN LISPRO 100/ML
VIAL (ML) SUBCUTANEOUS AT BEDTIME
Refills: 0 | Status: DISCONTINUED | OUTPATIENT
Start: 2021-06-03 | End: 2021-06-04

## 2021-06-03 RX ORDER — ACETAMINOPHEN 500 MG
1000 TABLET ORAL ONCE
Refills: 0 | Status: COMPLETED | OUTPATIENT
Start: 2021-06-03 | End: 2021-06-03

## 2021-06-03 RX ADMIN — SODIUM CHLORIDE 50 MILLILITER(S): 9 INJECTION, SOLUTION INTRAVENOUS at 10:11

## 2021-06-03 RX ADMIN — ESCITALOPRAM OXALATE 5 MILLIGRAM(S): 10 TABLET, FILM COATED ORAL at 16:40

## 2021-06-03 RX ADMIN — TAMSULOSIN HYDROCHLORIDE 0.4 MILLIGRAM(S): 0.4 CAPSULE ORAL at 21:32

## 2021-06-03 RX ADMIN — PANTOPRAZOLE SODIUM 40 MILLIGRAM(S): 20 TABLET, DELAYED RELEASE ORAL at 06:34

## 2021-06-03 RX ADMIN — URSODIOL 500 MILLIGRAM(S): 250 TABLET, FILM COATED ORAL at 16:43

## 2021-06-03 RX ADMIN — Medication 50 MILLIGRAM(S): at 06:34

## 2021-06-03 RX ADMIN — Medication 50 MILLIGRAM(S): at 16:38

## 2021-06-03 RX ADMIN — Medication 1000 MILLIGRAM(S): at 22:45

## 2021-06-03 RX ADMIN — URSODIOL 500 MILLIGRAM(S): 250 TABLET, FILM COATED ORAL at 06:34

## 2021-06-03 RX ADMIN — Medication 1 GRAM(S): at 16:42

## 2021-06-03 RX ADMIN — ATORVASTATIN CALCIUM 40 MILLIGRAM(S): 80 TABLET, FILM COATED ORAL at 21:32

## 2021-06-03 RX ADMIN — CLOPIDOGREL BISULFATE 75 MILLIGRAM(S): 75 TABLET, FILM COATED ORAL at 16:39

## 2021-06-03 RX ADMIN — Medication 975 MILLIGRAM(S): at 16:40

## 2021-06-03 RX ADMIN — FINASTERIDE 5 MILLIGRAM(S): 5 TABLET, FILM COATED ORAL at 16:38

## 2021-06-03 RX ADMIN — Medication 300 MILLIGRAM(S): at 16:39

## 2021-06-03 RX ADMIN — LOSARTAN POTASSIUM 100 MILLIGRAM(S): 100 TABLET, FILM COATED ORAL at 06:34

## 2021-06-03 RX ADMIN — Medication 400 MILLIGRAM(S): at 22:12

## 2021-06-03 NOTE — CONSULT NOTE ADULT - SUBJECTIVE AND OBJECTIVE BOX
HPI:  70 Year-Old Gentleman with history of CAD/MI status post stent placed in 01/2021 on Plavix, gout, HTN, HLD, presenting with 3 days of RUQ pain.  CT a/p and abdominal US showed cholelithiasis without evidence of acute cholecystitis.      Allergies:No Known Allergies      PAST MEDICAL & SURGICAL HISTORY:  Gout    Hypertension    HLD (hyperlipidemia)    CAD (coronary artery disease)    MI (myocardial infarction)  with 3 stents    Gout    Kidney stones    Hernia    H/O hernia repair    Pertinent labs:                      11.2   5.46  )-----------( 294      ( 01 Jun 2021 06:56 )             34.7   06-01    140  |  104  |  16  ----------------------------<  126<H>  4.0   |  22  |  0.97    Ca    8.9      01 Jun 2021 06:56  Phos  3.8     06-01  Mg     2.1     06-01    TPro  6.4  /  Alb  3.7  /  TBili  1.3<H>  /  DBili  x   /  AST  19  /  ALT  18  /  AlkPhos  87  06-01      A/P:  -Obtain HIDA scan if clinical concern for acute cholecystitis  -CT A/P and US showed cholelithiasis without evidence of acute cholecystitis  -No fever or leukocytosis  --No clinical indication for perc matthew at this time  --please [place] [phone] once HIDA done for review      
CHIEF COMPLAINT:Patient is a 70y old  Male who presents with a chief complaint of Symptomatic cholelithiasis (01 Jun 2021 01:52)      HISTORY OF PRESENT ILLNESS:    70 male with history as below, CAD s/p Stents last jan 2021 , HTN, HLD  admitted with abd pain , RUQ area found to have acute cholecystitis planned for   for lap matthew   pt denies any chest pain, sob, palpitation, dizziness or syncope.  exercise capacity is greater than 4 METs.     PAST MEDICAL & SURGICAL HISTORY:  Gout    Hypertension    HLD (hyperlipidemia)    CAD (coronary artery disease)    MI (myocardial infarction)  with 3 stents    Gout    Kidney stones    Hernia    H/O hernia repair            MEDICATIONS:  aspirin  chewable 81 milliGRAM(s) Oral every 24 hours  enoxaparin Injectable 40 milliGRAM(s) SubCutaneous daily  losartan 100 milliGRAM(s) Oral daily  metoprolol tartrate 50 milliGRAM(s) Oral two times a day  tamsulosin 0.4 milliGRAM(s) Oral at bedtime    piperacillin/tazobactam IVPB.. 3.375 Gram(s) IV Intermittent every 8 hours      acetaminophen  IVPB .. 1000 milliGRAM(s) IV Intermittent every 6 hours  escitalopram 5 milliGRAM(s) Oral daily  ondansetron Injectable 4 milliGRAM(s) IV Push once PRN    pantoprazole    Tablet 40 milliGRAM(s) Oral before breakfast    allopurinol 300 milliGRAM(s) Oral daily  atorvastatin 40 milliGRAM(s) Oral at bedtime  dextrose 40% Gel 15 Gram(s) Oral once  dextrose 50% Injectable 25 Gram(s) IV Push once  dextrose 50% Injectable 12.5 Gram(s) IV Push once  dextrose 50% Injectable 25 Gram(s) IV Push once  finasteride 5 milliGRAM(s) Oral daily  glucagon  Injectable 1 milliGRAM(s) IntraMuscular once  insulin lispro (ADMELOG) corrective regimen sliding scale   SubCutaneous every 6 hours    dextrose 5% + sodium chloride 0.45%. 1000 milliLiter(s) IV Continuous <Continuous>  dextrose 5%. 1000 milliLiter(s) IV Continuous <Continuous>  dextrose 5%. 1000 milliLiter(s) IV Continuous <Continuous>      FAMILY HISTORY:  No pertinent family history in first degree relatives        Non-contributory    SOCIAL HISTORY:    No tobacco, drugs or etoh    Allergies    No Known Allergies    Intolerances    	    REVIEW OF SYSTEMS:  as above  The rest of the 14 points ROS reviewed and except above they are unremarkable.        PHYSICAL EXAM:  T(C): 36.7 (06-01-21 @ 05:30), Max: 36.9 (05-31-21 @ 21:44)  HR: 60 (06-01-21 @ 06:28) (60 - 78)  BP: 129/81 (06-01-21 @ 06:28) (129/81 - 179/90)  RR: 17 (06-01-21 @ 05:30) (16 - 19)  SpO2: 96% (06-01-21 @ 05:30) (95% - 100%)  Wt(kg): --  I&O's Summary    31 May 2021 07:01  -  01 Jun 2021 07:00  --------------------------------------------------------  IN: 2960 mL / OUT: 2600 mL / NET: 360 mL        JVP: Normal  Neck: supple  Lung: clear   CV: S1 S2 , Murmur:  Abd: soft  Ext: No edema  neuro: Awake / alert  Psych: flat affect  Skin: normal      LABS/DATA:    TELEMETRY: 	    ECG:  	sinus, lateral T wave abn    	  CARDIAC MARKERS:                        8 <<== 05-30-21 @ 18:40                7 <<== 05-30-21 @ 16:58                              12.2   8.06  )-----------( 298      ( 31 May 2021 06:58 )             39.3     05-31    142  |  106  |  24<H>  ----------------------------<  113<H>  4.5   |  24  |  1.03    Ca    9.7      31 May 2021 06:58  Phos  4.2     05-31  Mg     2.1     05-31    TPro  6.7  /  Alb  3.8  /  TBili  0.8  /  DBili  x   /  AST  15  /  ALT  19  /  AlkPhos  98  05-30    proBNP:   Lipid Profile:   HgA1c:   TSH:           
HPI obtained with assistance of [employer]  [id number]    Mr. Zimmerman is a 70 Year-Old [place] Speaking Gentleman with history of CAD/MI status post stent placed in 01/2021 on Plavix, gout, HTN, HLD, presenting with 3 days of RUQ pain.  Pain started about 4 days ago, initially constant 10/10 sharp pain radiating to back but then since he got admitted to the hospital pain has become intermittent 7-8/10. Pain is worse with positional changes and physical movement.   Pain is not related to eating/ food. No nausea or vomiting. Has been tolerating diet. No fevers or chills   States he has never had symptoms like this before.   Also notes constipation. Baseline moves his bowel once / 1-2 days. ( his last bm was yesterday morning)  Has never had endoscopy or colonoscopy before.   Surgical history notable for R inguinal hernia repair ~20 years ago.   Initially admitted for symptomatic cholelithiasis given RUQ tenderness to palpation with + Escalante's sign.   Labs normal WBC and LFTs. CT and U/S Imaging obtained, with findings of cholelithiasis.  But negative HIDA scan  GI consult was called to rule out PUD.       Allergies:  No Known Allergies    Home Medications:  allopurinol 300 mg oral tablet: 1 tab(s) orally once a day (31 May 2021 11:36)  amLODIPine 5 mg oral tablet: 1 tab(s) orally once a day (31 May 2021 14:43)  aspirin 81 mg oral tablet:  (31 May 2021 14:44)  atorvastatin 40 mg oral tablet: 1 tab(s) orally once a day (31 May 2021 11:38)  clopidogrel 75 mg oral tablet: 1 tab(s) orally once a day (31 May 2021 14:42)  escitalopram 5 mg oral tablet: 1 tab(s) orally once a day (at bedtime) (31 May 2021 11:38)  finasteride 5 mg oral tablet: 1 tab(s) orally once a day (31 May 2021 11:38)  losartan-hydrochlorothiazide 100 mg-25 mg oral tablet: 1 tab(s) orally once a day (31 May 2021 11:44)  metFORMIN 500 mg oral tablet, extended release: 1 tab(s) orally once a day (31 May 2021 11:37)  metoprolol succinate 100 mg oral tablet, extended release: 1 tab(s) orally once a day (31 May 2021 11:37)  pantoprazole 40 mg oral delayed release tablet: 1 tab(s) orally once a day (31 May 2021 11:34)  tadalafil 5 mg oral tablet: 1 tab(s) orally once a day (31 May 2021 11:37)      Hospital Medications:  acetaminophen   Tablet .. 975 milliGRAM(s) Oral every 6 hours  allopurinol 300 milliGRAM(s) Oral daily  aspirin  chewable 81 milliGRAM(s) Oral daily  atorvastatin 40 milliGRAM(s) Oral at bedtime  clopidogrel Tablet 75 milliGRAM(s) Oral daily  dextrose 40% Gel 15 Gram(s) Oral once  dextrose 5%. 1000 milliLiter(s) IV Continuous <Continuous>  dextrose 5%. 1000 milliLiter(s) IV Continuous <Continuous>  dextrose 50% Injectable 25 Gram(s) IV Push once  dextrose 50% Injectable 12.5 Gram(s) IV Push once  dextrose 50% Injectable 25 Gram(s) IV Push once  enoxaparin Injectable 40 milliGRAM(s) SubCutaneous every 24 hours  escitalopram 5 milliGRAM(s) Oral daily  finasteride 5 milliGRAM(s) Oral daily  glucagon  Injectable 1 milliGRAM(s) IntraMuscular once  insulin lispro (ADMELOG) corrective regimen sliding scale   SubCutaneous three times a day before meals  insulin lispro (ADMELOG) corrective regimen sliding scale   SubCutaneous at bedtime  losartan 100 milliGRAM(s) Oral daily  metoprolol tartrate 50 milliGRAM(s) Oral two times a day  ondansetron Injectable 4 milliGRAM(s) IV Push once PRN  pantoprazole    Tablet 40 milliGRAM(s) Oral before breakfast  sucralfate 1 Gram(s) Oral four times a day  tamsulosin 0.4 milliGRAM(s) Oral at bedtime  ursodiol Tablet 500 milliGRAM(s) Oral two times a day      PMHX/PSHX:    Gout  Hypertension  HLD (hyperlipidemia)  CAD (coronary artery disease)  MI (myocardial infarction)  Gout  Kidney stones  Hernia  H/O hernia repair        Family history:  No family history of gastric cancer in first degree relatives        Social History: no smoking    ROS:   General:  No fevers, chills or night sweats  ENT:  No sore throat or dysphagia  CV:  No pain or palpitations  Resp:  No dyspnea, cough or  wheezing  GI:  as above  Skin:  No rash or edema  Neuro: no weakness   Hematologic: no bleeding  Musculoskeletal: no muscle pain or join pain  Psych: no agitation     : no dysuria      PHYSICAL EXAM:   GENERAL:  NAD, Appears stated age  HEENT:  NC/AT,  conjunctivae clear and pink, sclera -anicteric  CHEST:  CTA B/L, Normal effort  HEART:  RRR S1/S2,  ABDOMEN:  RUQ-tenderness, non-distended,  no masses   EXTREMITIES:  No cyanosis or Edema  SKIN:  Warm & Dry. No rash or erythema  NEURO:  Alert, oriented, no focal deficit    Vital Signs:  Vital Signs Last 24 Hrs  T(C): 36.6 (03 Jun 2021 05:16), Max: 36.9 (02 Jun 2021 17:12)  T(F): 97.9 (03 Jun 2021 05:16), Max: 98.5 (02 Jun 2021 21:30)  HR: 65 (03 Jun 2021 05:16) (65 - 80)  BP: 164/62 (03 Jun 2021 05:16) (132/78 - 164/62)  BP(mean): --  RR: 16 (03 Jun 2021 05:16) (14 - 18)  SpO2: 98% (03 Jun 2021 05:16) (97% - 99%)  Daily     Daily     LABS:                        11.2   6.57  )-----------( 282      ( 02 Jun 2021 06:25 )             35.5       06-02    142  |  107  |  10  ----------------------------<  143<H>  3.8   |  23  |  1.04    Ca    9.2      02 Jun 2021 06:25  Phos  3.6     06-02  Mg     2.1     06-02    TPro  6.6  /  Alb  3.8  /  TBili  1.3<H>  /  DBili  x   /  AST  17  /  ALT  19  /  AlkPhos  86  06-02    LIVER FUNCTIONS - ( 02 Jun 2021 06:25 )  Alb: 3.8 g/dL / Pro: 6.6 g/dL / ALK PHOS: 86 U/L / ALT: 19 U/L / AST: 17 U/L / GGT: x           PT/INR - ( 02 Jun 2021 06:25 )   PT: 12.7 sec;   INR: 1.11 ratio         PTT - ( 02 Jun 2021 06:25 )  PTT:34.5 sec                            11.2   6.57  )-----------( 282      ( 02 Jun 2021 06:25 )             35.5                         11.2   5.46  )-----------( 294      ( 01 Jun 2021 06:56 )             34.7     Imaging:    < from: US Abdomen Upper Quadrant Right (05.30.21 @ 21:35) >    EXAM:  US ABDOMEN RT UPR QUADRANT        PROCEDURE DATE:  May 30 2021         INTERPRETATION:  CLINICAL INFORMATION: Right upper quadrant pain, positive Escalante's sign.    COMPARISON: CT abdomen and pelvis 5/30/2021.    TECHNIQUE: Sonography of the right upper quadrant.    FINDINGS:    Liver: Within normal limits.  Bile ducts: Normal caliber. Common bile duct measures 4 mm.  Gallbladder: Cholelithiasis with upper limits normal thickness wall. Patient endorsed tenderness during exam.  Pancreas: Poorly visualized.  Right kidney: 12.6 cm. A 1.6 cm upper pole cyst. No hydronephrosis.  Ascites: None.    IMPRESSION:    Cholelithiasis with tenderness during exam. Consider follow-up with nuclear medicine DISIDA scan which is more sensitive for the detection of acute cholecystitis.    < end of copied text >  < from: CT Abdomen and Pelvis w/ IV Cont (05.30.21 @ 18:14) >  EXAM:  CT ABDOMEN AND PELVIS IC        PROCEDURE DATE:  May 30 2021         INTERPRETATION:  CLINICAL INFORMATION: Right upper quadrant pain.    COMPARISON: None.    CONTRAST/COMPLICATIONS:  IV Contrast: Omnipaque 350  90 cc administered   10 cc discarded  Oral Contrast: NONE  Complications: None reported at time of study completion    PROCEDURE:  CT of the Abdomen and Pelvis was performed.  Sagittal and coronal reformats were performed.    FINDINGS:  LOWER CHEST: Within normal limits.    LIVER: Within normal limits.  BILE DUCTS: Normal caliber.  GALLBLADDER: Cholelithiasis.  SPLEEN: Within normal limits.  PANCREAS: Within normal limits.  ADRENALS: Within normal limits.  KIDNEYS/URETERS: No hydronephrosis. Symmetric enhancement. Bilateral cysts.    BLADDER: Within normal limits.  REPRODUCTIVE ORGANS: Prostate is enlarged.    BOWEL: No bowel obstruction or bowel wall thickening. Appendix is normal.  PERITONEUM: No ascites.  VESSELS: Atherosclerotic changes.  RETROPERITONEUM/LYMPH NODES: No lymphadenopathy.  ABDOMINAL WALL: Fat-containing umbilical hernia.  BONES: Degenerative changes.    IMPRESSION:  No acute abdominopelvic pathology.      < end of copied text >  < from: NM Hepatobiliary Imaging (06.02.21 @ 11:29) >  EXAM:  NM HEPATOBILIARY IMG        PROCEDURE DATE:  Jun 2 2021       INTERPRETATION:  RADIOPHARMACEUTICAL: 3.0 mCi Tc-99m-Mebrofenin, I.V.    CLINICAL INFORMATION: 70 year old male with right upper quadrant abdominal pain and cholelithiasis; referred to evaluate for acute cholecystitis.    TECHNIQUE:  Dynamic imaging of the anterior abdomen was performed for 1 hour following radiopharmaceutical injection. Static images of the abdomen in the anterior, right anterior oblique and right lateral views were obtained immediately thereafter.    COMPARISON: No previous hepatobiliary scans were available for comparison.    FINDINGS: There is prompt, homogeneous uptake of radiopharmaceutical by the hepatocytes. Activity is first seen in the gallbladder at about 20 minutes and in the bowel at about 25 minutes. There is good clearance of activity from the liver by the end of the study.    IMPRESSION: Normal hepatobiliary scan. No radionuclide evidence of acute cholecystitis.        < end of copied text >

## 2021-06-03 NOTE — PROGRESS NOTE ADULT - SUBJECTIVE AND OBJECTIVE BOX
SURGERY DAILY PROGRESS NOTE:     INTERVAL: patient endorsed pain while eating, was told to slow down     SUBJECTIVE/ROS: Patient feels well. Denies nausea, vomiting, chest pain, shortness of breath     MEDICATIONS  (STANDING):  acetaminophen   Tablet .. 975 milliGRAM(s) Oral every 6 hours  allopurinol 300 milliGRAM(s) Oral daily  aspirin  chewable 81 milliGRAM(s) Oral daily  atorvastatin 40 milliGRAM(s) Oral at bedtime  clopidogrel Tablet 75 milliGRAM(s) Oral daily  dextrose 40% Gel 15 Gram(s) Oral once  dextrose 5%. 1000 milliLiter(s) (50 mL/Hr) IV Continuous <Continuous>  dextrose 5%. 1000 milliLiter(s) (100 mL/Hr) IV Continuous <Continuous>  dextrose 50% Injectable 25 Gram(s) IV Push once  dextrose 50% Injectable 12.5 Gram(s) IV Push once  dextrose 50% Injectable 25 Gram(s) IV Push once  enoxaparin Injectable 40 milliGRAM(s) SubCutaneous every 24 hours  escitalopram 5 milliGRAM(s) Oral daily  finasteride 5 milliGRAM(s) Oral daily  glucagon  Injectable 1 milliGRAM(s) IntraMuscular once  insulin lispro (ADMELOG) corrective regimen sliding scale   SubCutaneous three times a day before meals  insulin lispro (ADMELOG) corrective regimen sliding scale   SubCutaneous at bedtime  losartan 100 milliGRAM(s) Oral daily  metoprolol tartrate 50 milliGRAM(s) Oral two times a day  pantoprazole    Tablet 40 milliGRAM(s) Oral before breakfast  tamsulosin 0.4 milliGRAM(s) Oral at bedtime  ursodiol Tablet 500 milliGRAM(s) Oral two times a day    MEDICATIONS  (PRN):  ondansetron Injectable 4 milliGRAM(s) IV Push once PRN Nausea and/or Vomiting      OBJECTIVE:    Vital Signs Last 24 Hrs  T(C): 36.9 (02 Jun 2021 21:30), Max: 36.9 (02 Jun 2021 17:12)  T(F): 98.5 (02 Jun 2021 21:30), Max: 98.5 (02 Jun 2021 21:30)  HR: 74 (02 Jun 2021 21:30) (63 - 82)  BP: 142/77 (02 Jun 2021 21:30) (132/78 - 142/77)  BP(mean): --  RR: 18 (02 Jun 2021 21:30) (14 - 18)  SpO2: 99% (02 Jun 2021 21:30) (97% - 99%)    I&O's Detail    01 Jun 2021 07:01  -  02 Jun 2021 07:00  --------------------------------------------------------  IN:    dextrose 5% + sodium chloride 0.45%: 2500 mL    IV PiggyBack: 400 mL    Oral Fluid: 50 mL  Total IN: 2950 mL    OUT:    Voided (mL): 2650 mL  Total OUT: 2650 mL    Total NET: 300 mL      02 Jun 2021 07:01  -  03 Jun 2021 01:18  --------------------------------------------------------  IN:    dextrose 5% + sodium chloride 0.45%: 1000 mL    IV PiggyBack: 100 mL    Oral Fluid: 350 mL  Total IN: 1450 mL    OUT:    Voided (mL): 2000 mL  Total OUT: 2000 mL    Total NET: -550 mL          Daily     Daily     LABS:                        11.2   6.57  )-----------( 282      ( 02 Jun 2021 06:25 )             35.5     06-02    142  |  107  |  10  ----------------------------<  143<H>  3.8   |  23  |  1.04    Ca    9.2      02 Jun 2021 06:25  Phos  3.6     06-02  Mg     2.1     06-02    TPro  6.6  /  Alb  3.8  /  TBili  1.3<H>  /  DBili  x   /  AST  17  /  ALT  19  /  AlkPhos  86  06-02    PT/INR - ( 02 Jun 2021 06:25 )   PT: 12.7 sec;   INR: 1.11 ratio         PTT - ( 02 Jun 2021 06:25 )  PTT:34.5 sec      Physical Exam: General: No Acute Distress.  Respiratory: Airway patent, respirations unlabored.  Abdomen: Large, soft, tender in RUQ with +Escalante's, nondistended.  Extremities: Warm, moves all four.  Musculoskeletal: No tenderness of extremities, mobile joints. A/P:      Mr. Zimmerman is a 70 Year-Old Gentleman with history of CAD/MI status post stent placed in 01/2021 on Plavix, gout, HTN, HLD, presenting with 3 days of RUQ pain, admitted with likely acute cholecystitis.     Plan:  - food trial  - Cards: elevated cardiac risk for lap matthew, can hold plavix  - pain control    B Team Surgery   [id number]  SURGERY DAILY PROGRESS NOTE:     INTERVAL: Patient continue to report post-prandial pain for 1-2 hours that is located in the RUQ and radiates around to the back.      MEDICATIONS  (STANDING):  acetaminophen   Tablet .. 975 milliGRAM(s) Oral every 6 hours  allopurinol 300 milliGRAM(s) Oral daily  aspirin  chewable 81 milliGRAM(s) Oral daily  atorvastatin 40 milliGRAM(s) Oral at bedtime  clopidogrel Tablet 75 milliGRAM(s) Oral daily  dextrose 40% Gel 15 Gram(s) Oral once  dextrose 5%. 1000 milliLiter(s) (50 mL/Hr) IV Continuous <Continuous>  dextrose 5%. 1000 milliLiter(s) (100 mL/Hr) IV Continuous <Continuous>  dextrose 50% Injectable 25 Gram(s) IV Push once  dextrose 50% Injectable 12.5 Gram(s) IV Push once  dextrose 50% Injectable 25 Gram(s) IV Push once  enoxaparin Injectable 40 milliGRAM(s) SubCutaneous every 24 hours  escitalopram 5 milliGRAM(s) Oral daily  finasteride 5 milliGRAM(s) Oral daily  glucagon  Injectable 1 milliGRAM(s) IntraMuscular once  insulin lispro (ADMELOG) corrective regimen sliding scale   SubCutaneous three times a day before meals  insulin lispro (ADMELOG) corrective regimen sliding scale   SubCutaneous at bedtime  losartan 100 milliGRAM(s) Oral daily  metoprolol tartrate 50 milliGRAM(s) Oral two times a day  pantoprazole    Tablet 40 milliGRAM(s) Oral before breakfast  tamsulosin 0.4 milliGRAM(s) Oral at bedtime  ursodiol Tablet 500 milliGRAM(s) Oral two times a day    MEDICATIONS  (PRN):  ondansetron Injectable 4 milliGRAM(s) IV Push once PRN Nausea and/or Vomiting      OBJECTIVE:    Vital Signs Last 24 Hrs  ICU Vital Signs Last 24 Hrs  T(C): 36.6 (03 Jun 2021 05:16), Max: 36.9 (02 Jun 2021 17:12)  T(F): 97.9 (03 Jun 2021 05:16), Max: 98.5 (02 Jun 2021 21:30)  HR: 65 (03 Jun 2021 05:16) (65 - 80)  BP: 164/62 (03 Jun 2021 05:16) (132/78 - 164/62)  RR: 16 (03 Jun 2021 05:16) (14 - 18)  SpO2: 98% (03 Jun 2021 05:16) (97% - 99%)    I&O's Detail    02 Jun 2021 07:01  -  03 Jun 2021 07:00  --------------------------------------------------------  IN:    dextrose 5% + sodium chloride 0.45%: 1000 mL    IV PiggyBack: 100 mL    Oral Fluid: 350 mL  Total IN: 1450 mL    OUT:    Voided (mL): 2300 mL  Total OUT: 2300 mL    Total NET: -850 mL      LABS:                                 12.2   6.70  )-----------( 341      ( 03 Jun 2021 08:08 )             38.6   06-02    142  |  107  |  10  ----------------------------<  143<H>  3.8   |  23  |  1.04    Ca    9.2      02 Jun 2021 06:25  Phos  3.0     06-03  Mg     2.1     06-03    TPro  7.2  /  Alb  4.5  /  TBili  1.2  /  DBili  0.2  /  AST  25  /  ALT  26  /  AlkPhos  93  06-03        Physical Exam:   General: No Acute Distress.  Respiratory: Airway patent, respirations unlabored.  Abdomen: Large, soft, tender in RUQ, nondistended.  Extremities: Warm, moves all four  Musculoskeletal: No tenderness of extremities, mobile joints.     A/P:  Mr. Zimmerman is a 70 Year-Old Gentleman with history of CAD/MI status post stent placed in 01/2021 on Plavix, gout, HTN, HLD, presenting with 3 days of RUQ pain, found to have a gallstone in the cystic duct, underwent HIDA scan which was negative     Plan:  - GI consult - made NPO for possible endoscopy  - F/U Lipase   - Started Carafate   - Pain control   - Cards: elevated cardiac risk for lap matthew, can hold plavix  - DVT PPx  - OOB/Ambulation     B Team Surgery   [id number]

## 2021-06-03 NOTE — CONSULT NOTE ADULT - ASSESSMENT
70 Year-Old Palestinian Speaking Gentleman with history of CAD/MI status post stent placed in 01/2021 on Plavix, gout, HTN, HLD, presenting with 3 days of RUQ pain.   Labs normal WBC and liver enzymes except for T. Bili of 1.3   CT and U/S Imaging obtained, with findings of cholelithiasis.  ( Normal bile duct. )   Negative HIDA scan  GI consult was called to rule out PUD.     Impression:  # RUQ abdominal pain DDx:  symptomatic cholelithiasis- biliary colic, choledocholithiasis ( passed stone), PUD but less likely  # CAD/MI status post stent placed in 01/2021 on Plavix      Recommendations:  - NPO  - Will discuss with attending    - RAJESH Mckinney   Gastroenterology Fellow  Pager: [phone]  Please call answering service [phone] / on-call GI fellow after 5pm and before 8am, and on weekends. 70 Year-Old [place] Islander Speaking Gentleman with history of CAD/MI status post stent placed in 01/2021 on Plavix, gout, HTN, HLD, presenting with 3 days of RUQ pain.   Labs normal WBC and liver enzymes except for T. Bili of 1.3   CT and U/S Imaging obtained, with findings of cholelithiasis.  ( Normal bile duct. )   Negative HIDA scan  GI consult was called to rule out PUD.     Impression:  # RUQ abdominal pain DDx:  symptomatic cholelithiasis- biliary colic, choledocholithiasis ( passed stone), PUD but less likely  # CAD/MI status post stent placed in 01/2021 on Plavix      Recommendations:  - NPO  - Tentative plan for EGD today if endo schedule allows  - Please hold carafate  - PPI     Valery Mckinney   Gastroenterology Fellow  Pager: [phone]  Please call answering service [phone] / on-call GI fellow after 5pm and before 8am, and on weekends.

## 2021-06-03 NOTE — CONSULT NOTE ADULT - ATTENDING COMMENTS
Patient admitted with acute RUQ pain with imaging findings of cholelithiasis. HIDA negative for acute cholecystitis.   Suspect likely biliary colic, however, surgical team has requested EGD to r/o alternate etiology (ie PUD) prior to any further interventions, including cholecystectomy.   Will try to accommodate patient on today's schedule for further evaluation.

## 2021-06-03 NOTE — PROGRESS NOTE ADULT - ATTENDING COMMENTS
Pt seen and examined.  Agree with Housestaff eval and plan.  HIDA negative for acute cholecystitis.  Likely biliary colic.  EGD Negative for ulcer.  Pt still refusing surgery.  NSAIDs and home with nonfat diet.
Pt seen and examined.  Agree with housestaff assessment and plan.  Possible cholecystectomy vs IR cholecystostomy tube pending cardiac risk assessment.

## 2021-06-03 NOTE — PROGRESS NOTE ADULT - SUBJECTIVE AND OBJECTIVE BOX
DATE OF SERVICE: 06-03-21 @ 09:24    Subjective: Patient seen and examined. No new events except as noted.     SUBJECTIVE/ROS:  feels ok     MEDICATIONS:  MEDICATIONS  (STANDING):  acetaminophen   Tablet .. 975 milliGRAM(s) Oral every 6 hours  allopurinol 300 milliGRAM(s) Oral daily  aspirin enteric coated 81 milliGRAM(s) Oral daily  atorvastatin 40 milliGRAM(s) Oral at bedtime  clopidogrel Tablet 75 milliGRAM(s) Oral daily  dextrose 5% + sodium chloride 0.45%. 1000 milliLiter(s) (50 mL/Hr) IV Continuous <Continuous>  enoxaparin Injectable 40 milliGRAM(s) SubCutaneous every 24 hours  escitalopram 5 milliGRAM(s) Oral daily  finasteride 5 milliGRAM(s) Oral daily  losartan 100 milliGRAM(s) Oral daily  metoprolol tartrate 50 milliGRAM(s) Oral two times a day  pantoprazole    Tablet 40 milliGRAM(s) Oral before breakfast  sucralfate 1 Gram(s) Oral four times a day  tamsulosin 0.4 milliGRAM(s) Oral at bedtime  ursodiol Tablet 500 milliGRAM(s) Oral two times a day      PHYSICAL EXAM:  T(C): 36.6 (06-03-21 @ 05:16), Max: 36.9 (06-02-21 @ 17:12)  HR: 65 (06-03-21 @ 05:16) (65 - 78)  BP: 164/62 (06-03-21 @ 05:16) (132/78 - 164/62)  RR: 16 (06-03-21 @ 05:16) (16 - 18)  SpO2: 98% (06-03-21 @ 05:16) (97% - 99%)  Wt(kg): --  I&O's Summary    02 Jun 2021 07:01  -  03 Jun 2021 07:00  --------------------------------------------------------  IN: 1450 mL / OUT: 2300 mL / NET: -850 mL            JVP: Normal  Neck: supple  Lung: clear   CV: S1 S2 , Murmur:  Abd: soft  Ext: No edema  neuro: Awake / alert  Psych: flat affect  Skin: normal``    LABS/DATA:    CARDIAC MARKERS:                                12.2   6.70  )-----------( 341      ( 03 Jun 2021 08:08 )             38.6     06-02    142  |  107  |  10  ----------------------------<  143<H>  3.8   |  23  |  1.04    Ca    9.2      02 Jun 2021 06:25  Phos  3.0     06-03  Mg     2.1     06-03    TPro  7.2  /  Alb  4.5  /  TBili  1.2  /  DBili  0.2  /  AST  25  /  ALT  26  /  AlkPhos  93  06-03    proBNP:   Lipid Profile:   HgA1c:   TSH:     TELE:  EKG:

## 2021-06-03 NOTE — PRE-OP CHECKLIST - IV STARTED
Follow BRAT (bananas, rice, apples, toast) diet as discussed  Once feeling up to it, you can begin to introduce new foods and advance diet as it is tolerated  Take Pepto-bismol or Tums as directed for nausea and stomach upset  Stay hydrated, drinking plenty of water and gatorade  If symptoms worsen or if not resolvng, call PCP or go to the ER 
yes
yes

## 2021-06-04 ENCOUNTER — TRANSCRIPTION ENCOUNTER (OUTPATIENT)
Age: 71
End: 2021-06-04

## 2021-06-04 VITALS
DIASTOLIC BLOOD PRESSURE: 80 MMHG | SYSTOLIC BLOOD PRESSURE: 163 MMHG | HEART RATE: 66 BPM | TEMPERATURE: 98 F | OXYGEN SATURATION: 100 % | RESPIRATION RATE: 18 BRPM

## 2021-06-04 LAB
ALBUMIN SERPL ELPH-MCNC: 4.3 G/DL — SIGNIFICANT CHANGE UP (ref 3.3–5)
ALP SERPL-CCNC: 92 U/L — SIGNIFICANT CHANGE UP (ref 40–120)
ALT FLD-CCNC: 40 U/L — SIGNIFICANT CHANGE UP (ref 4–41)
ANION GAP SERPL CALC-SCNC: 13 MMOL/L — SIGNIFICANT CHANGE UP (ref 7–14)
AST SERPL-CCNC: 35 U/L — SIGNIFICANT CHANGE UP (ref 4–40)
BILIRUB DIRECT SERPL-MCNC: 0.2 MG/DL — SIGNIFICANT CHANGE UP (ref 0–0.2)
BILIRUB INDIRECT FLD-MCNC: 1 MG/DL — SIGNIFICANT CHANGE UP (ref 0–1)
BILIRUB SERPL-MCNC: 1.2 MG/DL — SIGNIFICANT CHANGE UP (ref 0.2–1.2)
BUN SERPL-MCNC: 15 MG/DL — SIGNIFICANT CHANGE UP (ref 7–23)
CALCIUM SERPL-MCNC: 9.1 MG/DL — SIGNIFICANT CHANGE UP (ref 8.4–10.5)
CHLORIDE SERPL-SCNC: 105 MMOL/L — SIGNIFICANT CHANGE UP (ref 98–107)
CO2 SERPL-SCNC: 23 MMOL/L — SIGNIFICANT CHANGE UP (ref 22–31)
CREAT SERPL-MCNC: 0.93 MG/DL — SIGNIFICANT CHANGE UP (ref 0.5–1.3)
GLUCOSE BLDC GLUCOMTR-MCNC: 161 MG/DL — HIGH (ref 70–99)
GLUCOSE SERPL-MCNC: 112 MG/DL — HIGH (ref 70–99)
HCT VFR BLD CALC: 37.3 % — LOW (ref 39–50)
HGB BLD-MCNC: 11.5 G/DL — LOW (ref 13–17)
MAGNESIUM SERPL-MCNC: 2 MG/DL — SIGNIFICANT CHANGE UP (ref 1.6–2.6)
MCHC RBC-ENTMCNC: 26.7 PG — LOW (ref 27–34)
MCHC RBC-ENTMCNC: 30.8 GM/DL — LOW (ref 32–36)
MCV RBC AUTO: 86.7 FL — SIGNIFICANT CHANGE UP (ref 80–100)
NRBC # BLD: 0 /100 WBCS — SIGNIFICANT CHANGE UP
NRBC # FLD: 0 K/UL — SIGNIFICANT CHANGE UP
PHOSPHATE SERPL-MCNC: 3.3 MG/DL — SIGNIFICANT CHANGE UP (ref 2.5–4.5)
PLATELET # BLD AUTO: 292 K/UL — SIGNIFICANT CHANGE UP (ref 150–400)
POTASSIUM SERPL-MCNC: 3.8 MMOL/L — SIGNIFICANT CHANGE UP (ref 3.5–5.3)
POTASSIUM SERPL-SCNC: 3.8 MMOL/L — SIGNIFICANT CHANGE UP (ref 3.5–5.3)
PROT SERPL-MCNC: 7 G/DL — SIGNIFICANT CHANGE UP (ref 6–8.3)
RBC # BLD: 4.3 M/UL — SIGNIFICANT CHANGE UP (ref 4.2–5.8)
RBC # FLD: 14.5 % — SIGNIFICANT CHANGE UP (ref 10.3–14.5)
SODIUM SERPL-SCNC: 141 MMOL/L — SIGNIFICANT CHANGE UP (ref 135–145)
WBC # BLD: 6.22 K/UL — SIGNIFICANT CHANGE UP (ref 3.8–10.5)
WBC # FLD AUTO: 6.22 K/UL — SIGNIFICANT CHANGE UP (ref 3.8–10.5)

## 2021-06-04 RX ORDER — IBUPROFEN 200 MG
1 TABLET ORAL
Qty: 0 | Refills: 0 | DISCHARGE
Start: 2021-06-04

## 2021-06-04 RX ORDER — ACETAMINOPHEN 500 MG
3 TABLET ORAL
Qty: 0 | Refills: 0 | DISCHARGE
Start: 2021-06-04

## 2021-06-04 RX ADMIN — PANTOPRAZOLE SODIUM 40 MILLIGRAM(S): 20 TABLET, DELAYED RELEASE ORAL at 06:51

## 2021-06-04 RX ADMIN — Medication 400 MILLIGRAM(S): at 09:13

## 2021-06-04 RX ADMIN — LOSARTAN POTASSIUM 100 MILLIGRAM(S): 100 TABLET, FILM COATED ORAL at 06:51

## 2021-06-04 RX ADMIN — URSODIOL 500 MILLIGRAM(S): 250 TABLET, FILM COATED ORAL at 06:51

## 2021-06-04 RX ADMIN — Medication 975 MILLIGRAM(S): at 09:13

## 2021-06-04 RX ADMIN — Medication 50 MILLIGRAM(S): at 06:51

## 2021-06-04 NOTE — DISCHARGE NOTE NURSING/CASE MANAGEMENT/SOCIAL WORK - NSTOBACCONEVERSMOKERY/N_GEN_A
Cosentyx Pregnancy And Lactation Text: This medication is Pregnancy Category B and is considered safe during pregnancy. It is unknown if this medication is excreted in breast milk. No

## 2021-06-04 NOTE — DISCHARGE NOTE NURSING/CASE MANAGEMENT/SOCIAL WORK - NSDCPNINST_GEN_ALL_CORE
Call your provider for a follow-up appointment.  Call your provider for fever and/chills; persistent nausea, vomiting, diarrhea; uncontrolled bleeding; sever pain.

## 2021-06-04 NOTE — PROGRESS NOTE ADULT - SUBJECTIVE AND OBJECTIVE BOX
DATE OF SERVICE: 06-04-21 @ 07:05    Subjective: Patient seen and examined. No new events except as noted.     SUBJECTIVE/ROS:  No chest pain, dyspnea, palpitation, or dizziness.       MEDICATIONS:  MEDICATIONS  (STANDING):  acetaminophen   Tablet .. 975 milliGRAM(s) Oral every 6 hours  allopurinol 300 milliGRAM(s) Oral daily  aspirin enteric coated 81 milliGRAM(s) Oral daily  atorvastatin 40 milliGRAM(s) Oral at bedtime  clopidogrel Tablet 75 milliGRAM(s) Oral daily  enoxaparin Injectable 40 milliGRAM(s) SubCutaneous every 24 hours  escitalopram 5 milliGRAM(s) Oral daily  finasteride 5 milliGRAM(s) Oral daily  ibuprofen  Tablet. 400 milliGRAM(s) Oral every 6 hours  insulin lispro (ADMELOG) corrective regimen sliding scale   SubCutaneous three times a day before meals  insulin lispro (ADMELOG) corrective regimen sliding scale   SubCutaneous at bedtime  losartan 100 milliGRAM(s) Oral daily  metoprolol tartrate 50 milliGRAM(s) Oral two times a day  pantoprazole    Tablet 40 milliGRAM(s) Oral before breakfast  sucralfate 1 Gram(s) Oral four times a day  tamsulosin 0.4 milliGRAM(s) Oral at bedtime  ursodiol Tablet 500 milliGRAM(s) Oral two times a day      PHYSICAL EXAM:  T(C): 36.9 (06-04-21 @ 05:03), Max: 36.9 (06-03-21 @ 21:13)  HR: 76 (06-04-21 @ 05:03) (61 - 85)  BP: 149/87 (06-04-21 @ 05:03) (123/68 - 185/85)  RR: 18 (06-04-21 @ 05:03) (15 - 18)  SpO2: 98% (06-04-21 @ 05:03) (97% - 100%)  Wt(kg): --  I&O's Summary    03 Jun 2021 07:01  -  04 Jun 2021 07:00  --------------------------------------------------------  IN: 240 mL / OUT: 2650 mL / NET: -2410 mL      Height (cm): 162.6 (06-03 @ 11:11)  Weight (kg): 106.4 (06-03 @ 11:11)  BMI (kg/m2): 40.2 (06-03 @ 11:11)  BSA (m2): 2.09 (06-03 @ 11:11)      JVP: Normal  Neck: supple  Lung: clear   CV: S1 S2 , Murmur:  Abd: soft  Ext: No edema  neuro: Awake / alert  Psych: flat affect  Skin: normal``    LABS/DATA:    CARDIAC MARKERS:                                12.2   6.70  )-----------( 341      ( 03 Jun 2021 08:08 )             38.6     06-03    143  |  106  |  10  ----------------------------<  114<H>  3.9   |  23  |  0.99    Ca    9.0      03 Jun 2021 08:10  Phos  3.0     06-03  Mg     2.1     06-03    TPro  7.2  /  Alb  4.5  /  TBili  1.2  /  DBili  0.2  /  AST  25  /  ALT  26  /  AlkPhos  93  06-03    proBNP:   Lipid Profile:   HgA1c:   TSH:     TELE:  EKG:

## 2021-06-04 NOTE — DISCHARGE NOTE PROVIDER - NSDCCPCAREPLAN_GEN_ALL_CORE_FT
PRINCIPAL DISCHARGE DIAGNOSIS  Diagnosis: Biliary colic  Assessment and Plan of Treatment: You will need to follow up for surgery.      SECONDARY DISCHARGE DIAGNOSES  Diagnosis: Biliary colic  Assessment and Plan of Treatment:

## 2021-06-04 NOTE — DISCHARGE NOTE NURSING/CASE MANAGEMENT/SOCIAL WORK - PATIENT PORTAL LINK FT
You can access the FollowMyHealth Patient Portal offered by [facility] by registering at the following website: [email or web address]. By joining [facility]’s FollowMyHealth portal, you will also be able to view your health information using other applications (apps) compatible with our system.

## 2021-06-04 NOTE — DISCHARGE NOTE PROVIDER - NSDCFUADDINST_GEN_ALL_CORE_FT
BATHING: Bathe as normal  ACTIVITY: Resume activities of daily living.  DIET: Return to your usual diet.  NOTIFY YOUR SURGEON IF: You have any bleeding that does not stop, any fever (over 100.4 F) or chills, persistent nausea/vomiting, persistent diarrhea, or if your pain is not controlled on your discharge pain medications.  FOLLOW-UP:  1. Please call to make a follow-up appointment within two weeks of discharge  2. Please follow up with your primary care provider in one week regarding your hospitalization, please bring all discharge paperwork with you to this follow up appointment.

## 2021-06-04 NOTE — DISCHARGE NOTE PROVIDER - CARE PROVIDER_API CALL
Seamus Martinez)  Surgery; Surgical Critical Care  [address] [place] [address]  Phone: [phone]  Fax: [phone]  Follow Up Time:

## 2021-06-04 NOTE — PROGRESS NOTE ADULT - SUBJECTIVE AND OBJECTIVE BOX
SURGERY DAILY PROGRESS NOTE:     SUBJECTIVE/ROS: Patient seen and examined at bedside. Endorsing pain overnight, refused PO medications. IV tylenol x1.  Denies nausea, vomiting, chest pain, shortness of breath at this time.      MEDICATIONS  (STANDING):  acetaminophen   Tablet .. 975 milliGRAM(s) Oral every 6 hours  allopurinol 300 milliGRAM(s) Oral daily  aspirin enteric coated 81 milliGRAM(s) Oral daily  atorvastatin 40 milliGRAM(s) Oral at bedtime  clopidogrel Tablet 75 milliGRAM(s) Oral daily  enoxaparin Injectable 40 milliGRAM(s) SubCutaneous every 24 hours  escitalopram 5 milliGRAM(s) Oral daily  finasteride 5 milliGRAM(s) Oral daily  ibuprofen  Tablet. 400 milliGRAM(s) Oral every 6 hours  insulin lispro (ADMELOG) corrective regimen sliding scale   SubCutaneous three times a day before meals  insulin lispro (ADMELOG) corrective regimen sliding scale   SubCutaneous at bedtime  losartan 100 milliGRAM(s) Oral daily  metoprolol tartrate 50 milliGRAM(s) Oral two times a day  pantoprazole    Tablet 40 milliGRAM(s) Oral before breakfast  sucralfate 1 Gram(s) Oral four times a day  tamsulosin 0.4 milliGRAM(s) Oral at bedtime  ursodiol Tablet 500 milliGRAM(s) Oral two times a day    MEDICATIONS  (PRN):      OBJECTIVE:    Vital Signs Last 24 Hrs  T(C): 36.6 (04 Jun 2021 00:07), Max: 36.9 (03 Jun 2021 21:13)  T(F): 97.9 (04 Jun 2021 00:07), Max: 98.5 (03 Jun 2021 21:13)  HR: 71 (04 Jun 2021 00:07) (61 - 85)  BP: 151/81 (04 Jun 2021 00:07) (123/68 - 185/85)  BP(mean): 96 (03 Jun 2021 13:15) (83 - 116)  RR: 18 (04 Jun 2021 00:07) (15 - 18)  SpO2: 98% (04 Jun 2021 00:07) (97% - 100%)    I&O's Detail    02 Jun 2021 07:01  -  03 Jun 2021 07:00  --------------------------------------------------------  IN:    dextrose 5% + sodium chloride 0.45%: 1000 mL    IV PiggyBack: 100 mL    Oral Fluid: 350 mL  Total IN: 1450 mL    OUT:    Voided (mL): 2300 mL  Total OUT: 2300 mL    Total NET: -850 mL      03 Jun 2021 07:01  -  04 Jun 2021 00:49  --------------------------------------------------------  IN:    dextrose 5% + sodium chloride 0.45%: 100 mL    Oral Fluid: 140 mL  Total IN: 240 mL    OUT:    Voided (mL): 2150 mL  Total OUT: 2150 mL    Total NET: -1910 mL          Daily Height in cm: 162.6 (03 Jun 2021 11:11)    Daily     LABS:                        12.2   6.70  )-----------( 341      ( 03 Jun 2021 08:08 )             38.6     06-03    143  |  106  |  10  ----------------------------<  114<H>  3.9   |  23  |  0.99    Ca    9.0      03 Jun 2021 08:10  Phos  3.0     06-03  Mg     2.1     06-03    TPro  7.2  /  Alb  4.5  /  TBili  1.2  /  DBili  0.2  /  AST  25  /  ALT  26  /  AlkPhos  93  06-03    PT/INR - ( 02 Jun 2021 06:25 )   PT: 12.7 sec;   INR: 1.11 ratio         PTT - ( 02 Jun 2021 06:25 )  PTT:34.5 sec    PE:  General: No Acute Distress.  Respiratory: Airway patent, respirations unlabored.  Abdomen: Large, soft, tender in RUQ, nondistended.  Extremities: Warm, moves all four  Musculoskeletal: No tenderness of extremities, mobile joints.     A/P:  Mr. Zimmerman is a 70 Year-Old Gentleman with history of CAD/MI status post stent placed in 01/2021 on Plavix, gout, HTN, HLD, presenting with 3 days of RUQ pain, found to have a gallstone in the cystic duct, underwent HIDA scan which was negative     Plan:  - GI consult - made NPO for possible endoscopy  - F/U Lipase   - Started Carafate   - Pain control   - Cards: elevated cardiac risk for lap matthew, can hold plavix  - DVT PPx  - OOB/Ambulation     B Team Surgery   [id number]  SURGERY DAILY PROGRESS NOTE:     SUBJECTIVE/ROS: Patient seen and examined at bedside. Endorsing pain overnight, refused PO medications. IV tylenol x1.  Denies nausea, vomiting, chest pain, shortness of breath at this time.      MEDICATIONS  (STANDING):  acetaminophen   Tablet .. 975 milliGRAM(s) Oral every 6 hours  allopurinol 300 milliGRAM(s) Oral daily  aspirin enteric coated 81 milliGRAM(s) Oral daily  atorvastatin 40 milliGRAM(s) Oral at bedtime  clopidogrel Tablet 75 milliGRAM(s) Oral daily  enoxaparin Injectable 40 milliGRAM(s) SubCutaneous every 24 hours  escitalopram 5 milliGRAM(s) Oral daily  finasteride 5 milliGRAM(s) Oral daily  ibuprofen  Tablet. 400 milliGRAM(s) Oral every 6 hours  insulin lispro (ADMELOG) corrective regimen sliding scale   SubCutaneous three times a day before meals  insulin lispro (ADMELOG) corrective regimen sliding scale   SubCutaneous at bedtime  losartan 100 milliGRAM(s) Oral daily  metoprolol tartrate 50 milliGRAM(s) Oral two times a day  pantoprazole    Tablet 40 milliGRAM(s) Oral before breakfast  sucralfate 1 Gram(s) Oral four times a day  tamsulosin 0.4 milliGRAM(s) Oral at bedtime  ursodiol Tablet 500 milliGRAM(s) Oral two times a day    MEDICATIONS  (PRN):      OBJECTIVE:    Vital Signs Last 24 Hrs  T(C): 36.6 (04 Jun 2021 00:07), Max: 36.9 (03 Jun 2021 21:13)  T(F): 97.9 (04 Jun 2021 00:07), Max: 98.5 (03 Jun 2021 21:13)  HR: 71 (04 Jun 2021 00:07) (61 - 85)  BP: 151/81 (04 Jun 2021 00:07) (123/68 - 185/85)  BP(mean): 96 (03 Jun 2021 13:15) (83 - 116)  RR: 18 (04 Jun 2021 00:07) (15 - 18)  SpO2: 98% (04 Jun 2021 00:07) (97% - 100%)    I&O's Detail    02 Jun 2021 07:01  -  03 Jun 2021 07:00  --------------------------------------------------------  IN:    dextrose 5% + sodium chloride 0.45%: 1000 mL    IV PiggyBack: 100 mL    Oral Fluid: 350 mL  Total IN: 1450 mL    OUT:    Voided (mL): 2300 mL  Total OUT: 2300 mL    Total NET: -850 mL      03 Jun 2021 07:01  -  04 Jun 2021 00:49  --------------------------------------------------------  IN:    dextrose 5% + sodium chloride 0.45%: 100 mL    Oral Fluid: 140 mL  Total IN: 240 mL    OUT:    Voided (mL): 2150 mL  Total OUT: 2150 mL    Total NET: -1910 mL          Daily Height in cm: 162.6 (03 Jun 2021 11:11)    Daily     LABS:                        12.2   6.70  )-----------( 341      ( 03 Jun 2021 08:08 )             38.6     06-03    143  |  106  |  10  ----------------------------<  114<H>  3.9   |  23  |  0.99    Ca    9.0      03 Jun 2021 08:10  Phos  3.0     06-03  Mg     2.1     06-03    TPro  7.2  /  Alb  4.5  /  TBili  1.2  /  DBili  0.2  /  AST  25  /  ALT  26  /  AlkPhos  93  06-03    PT/INR - ( 02 Jun 2021 06:25 )   PT: 12.7 sec;   INR: 1.11 ratio         PTT - ( 02 Jun 2021 06:25 )  PTT:34.5 sec    PE:  General: No Acute Distress.  Respiratory: Airway patent, respirations unlabored.  Abdomen: Large, soft, tender in RUQ, nondistended.  Extremities: Warm, moves all four  Musculoskeletal: No tenderness of extremities, mobile joints.     A/P:  Mr. Zimmerman is a 70 Year-Old Gentleman with history of CAD/MI status post stent placed in 01/2021 on Plavix, gout, HTN, HLD, presenting with 3 days of RUQ pain, found to have a gallstone in the cystic duct, underwent HIDA scan which was negative     Plan:  - s/p endoscopy with mild non-bleeding erosive gastropathy and erythematous duodenitis  - F/U Lipase   - Started Carafate   - Pain control   - Cards: elevated cardiac risk for lap matthew, can hold plavix  - DVT PPx  - OOB/Ambulation     B Team Surgery   [id number]  SURGERY DAILY PROGRESS NOTE:     SUBJECTIVE/ROS: Patient seen and examined at bedside. Endorsing pain overnight, refused PO medications. IV tylenol x1.  Denies nausea, vomiting, chest pain, shortness of breath at this time.      MEDICATIONS  (STANDING):  acetaminophen   Tablet .. 975 milliGRAM(s) Oral every 6 hours  allopurinol 300 milliGRAM(s) Oral daily  aspirin enteric coated 81 milliGRAM(s) Oral daily  atorvastatin 40 milliGRAM(s) Oral at bedtime  clopidogrel Tablet 75 milliGRAM(s) Oral daily  enoxaparin Injectable 40 milliGRAM(s) SubCutaneous every 24 hours  escitalopram 5 milliGRAM(s) Oral daily  finasteride 5 milliGRAM(s) Oral daily  ibuprofen  Tablet. 400 milliGRAM(s) Oral every 6 hours  insulin lispro (ADMELOG) corrective regimen sliding scale   SubCutaneous three times a day before meals  insulin lispro (ADMELOG) corrective regimen sliding scale   SubCutaneous at bedtime  losartan 100 milliGRAM(s) Oral daily  metoprolol tartrate 50 milliGRAM(s) Oral two times a day  pantoprazole    Tablet 40 milliGRAM(s) Oral before breakfast  sucralfate 1 Gram(s) Oral four times a day  tamsulosin 0.4 milliGRAM(s) Oral at bedtime  ursodiol Tablet 500 milliGRAM(s) Oral two times a day    MEDICATIONS  (PRN):      OBJECTIVE:    Vital Signs Last 24 Hrs  T(C): 36.6 (04 Jun 2021 00:07), Max: 36.9 (03 Jun 2021 21:13)  T(F): 97.9 (04 Jun 2021 00:07), Max: 98.5 (03 Jun 2021 21:13)  HR: 71 (04 Jun 2021 00:07) (61 - 85)  BP: 151/81 (04 Jun 2021 00:07) (123/68 - 185/85)  BP(mean): 96 (03 Jun 2021 13:15) (83 - 116)  RR: 18 (04 Jun 2021 00:07) (15 - 18)  SpO2: 98% (04 Jun 2021 00:07) (97% - 100%)    I&O's Detail    02 Jun 2021 07:01  -  03 Jun 2021 07:00  --------------------------------------------------------  IN:    dextrose 5% + sodium chloride 0.45%: 1000 mL    IV PiggyBack: 100 mL    Oral Fluid: 350 mL  Total IN: 1450 mL    OUT:    Voided (mL): 2300 mL  Total OUT: 2300 mL    Total NET: -850 mL      03 Jun 2021 07:01  -  04 Jun 2021 00:49  --------------------------------------------------------  IN:    dextrose 5% + sodium chloride 0.45%: 100 mL    Oral Fluid: 140 mL  Total IN: 240 mL    OUT:    Voided (mL): 2150 mL  Total OUT: 2150 mL    Total NET: -1910 mL          Daily Height in cm: 162.6 (03 Jun 2021 11:11)    Daily     LABS:                        12.2   6.70  )-----------( 341      ( 03 Jun 2021 08:08 )             38.6     06-03    143  |  106  |  10  ----------------------------<  114<H>  3.9   |  23  |  0.99    Ca    9.0      03 Jun 2021 08:10  Phos  3.0     06-03  Mg     2.1     06-03    TPro  7.2  /  Alb  4.5  /  TBili  1.2  /  DBili  0.2  /  AST  25  /  ALT  26  /  AlkPhos  93  06-03    PT/INR - ( 02 Jun 2021 06:25 )   PT: 12.7 sec;   INR: 1.11 ratio         PTT - ( 02 Jun 2021 06:25 )  PTT:34.5 sec    PE:  General: No Acute Distress.  Respiratory: Airway patent, respirations unlabored.  Abdomen: Large, soft, tender in RUQ, nondistended.  Extremities: Warm, moves all four  Musculoskeletal: No tenderness of extremities, mobile joints.

## 2021-06-04 NOTE — PROGRESS NOTE ADULT - REASON FOR ADMISSION
Symptomatic cholelithiasis

## 2021-06-04 NOTE — DISCHARGE NOTE PROVIDER - NSDCMRMEDTOKEN_GEN_ALL_CORE_FT
acetaminophen 325 mg oral tablet: 3 tab(s) orally every 6 hours  allopurinol 300 mg oral tablet: 1 tab(s) orally once a day  amLODIPine 5 mg oral tablet: 1 tab(s) orally once a day  aspirin 81 mg oral tablet:   atorvastatin 40 mg oral tablet: 1 tab(s) orally once a day  clopidogrel 75 mg oral tablet: 1 tab(s) orally once a day  escitalopram 5 mg oral tablet: 1 tab(s) orally once a day (at bedtime)  finasteride 5 mg oral tablet: 1 tab(s) orally once a day  ibuprofen 400 mg oral tablet: 1 tab(s) orally every 6 hours  losartan-hydrochlorothiazide 100 mg-25 mg oral tablet: 1 tab(s) orally once a day  metFORMIN 500 mg oral tablet, extended release: 1 tab(s) orally once a day  metoprolol succinate 100 mg oral tablet, extended release: 1 tab(s) orally once a day  pantoprazole 40 mg oral delayed release tablet: 1 tab(s) orally once a day  tadalafil 5 mg oral tablet: 1 tab(s) orally once a day

## 2021-06-04 NOTE — PROGRESS NOTE ADULT - ASSESSMENT
Acute Cholecystitis   plan for lap matthew  on anbx  fu with surgery     CAD s/p stents  on asa  statin  can hold plavix for now     HTN  cont current meds    Pre-Operative Cardiac Risk Stratification and Optimization   Based on current ACC/AHA guidelines, patient history and physical exam, the patient is considered to have elevated risk   no evidence of acute MI   no acute CV symptoms  I personally reviewed Echo films, LV function appears to be normal   no absolute CV objection to proceed to this time sensitive surgery   
Acute Cholecystitis   plan for lap matthew  on anbx  fu with surgery   follow up with GI     CAD s/p stents  on asa  statin  can hold plavix for now     HTN  cont current meds    Pre-Operative Cardiac Risk Stratification and Optimization   Based on current ACC/AHA guidelines, patient history and physical exam, the patient is considered to have elevated risk   no evidence of acute MI   no acute CV symptoms  I personally reviewed Echo films, LV function appears to be normal   no absolute CV objection to proceed to this time sensitive surgery   
Acute Cholecystitis   HIDA scan neg  fu with surgery     CAD s/p stents  on asa  statin  can hold plavix for now     HTN  cont current meds    Pre-Operative Cardiac Risk Stratification and Optimization   Based on current ACC/AHA guidelines, patient history and physical exam, the patient is considered to have elevated risk   no evidence of acute MI   no acute CV symptoms  I personally reviewed Echo films, LV function appears to be normal     
A/P:  Mr. Zimmerman is a 70 Year-Old Gentleman with history of CAD/MI status post stent placed in 01/2021 on Plavix, gout, HTN, HLD, presenting with 3 days of RUQ pain, found to have a gallstone in the cystic duct, underwent HIDA scan which was negative     Plan:  - s/p endoscopy with mild non-bleeding erosive gastropathy and erythematous duodenitis  - F/U Lipase   - Started Carafate   - Pain control   - Cards: elevated cardiac risk for lap matthew, can hold plavix  - DVT PPx  - OOB/Ambulation   - Discharge home with outpatient follow up for lap matthew    B Team Surgery   [id number] 
Mr. Zimmerman is a 70 Year-Old Gentleman with history of CAD/MI status post stent placed in 01/2021 on Plavix, gout, HTN, HLD, presenting with 3 days of RUQ pain, admitted with likely acute cholecystitis.     Plan:  - IR consult for perc matthew  - Plavix for recent cardiac stent placement, last taken 5/30/21 AM.   - Nil Per Os, Intravenous Fluid Resuscitation Therapy.   - Intravenous antibiotic administration: Zosyn.  - Will obtain Cardiology consult (Dr. Ovalle) for clearance, home cardiologist (Dr. Campuzano [facility])    B Team Surgery Pager #[phone]

## 2021-06-04 NOTE — DISCHARGE NOTE PROVIDER - HOSPITAL COURSE
70 Year-Old Canadian Speaking Gentleman with history of CAD/MI status post stent placed in 01/2021 on Plavix, gout, HTN, HLD, presenting with 3 days of RUQ pain.  States he has never had symptoms like this before. Pain worsens with cough, radiates down legs. Also notes constipation. No nausea/emesis, has been tolerating diet. No fevers or chills.   Has never had endoscopy or colonoscopy before. Surgical history notable for R inguinal hernia repair ~20 years ago.   Physical exam notable for RUQ tenderness to palpation with + Escalante's sign. Labs drawn, normal WBC and LFTs. CT and U/S Imaging obtained, with findings of cholelithiasis. Patient was ruled out for acute cholecystitis but continued to have post prandial pain.    Gastroenterology was consulted and the patient underwent upper endoscopy which was significant for   - Medium-sized hiatal hernia.  - Mild non-bleeding erosive gastropathy. Unlikely causing patient's RUQ abdominal pain.  - Erythematous duodenopathy.    His diet was advanced and he was started on NSAIDs. His pain was improved. He requested to have outpatient follow up for surgery.    The patient was placed on home medications. At the time of discharge, the patient was hemodynamically stable, was tolerating PO diet, was voiding urine and passing stool, was ambulating, and was comfortable with adequate pain control. The patient was instructed to follow up with Dr. Marin within 2 weeks after discharge from the hospital. The patient felt comfortable with discharge. The patient had no other issues.

## 2021-06-15 ENCOUNTER — APPOINTMENT (OUTPATIENT)
Dept: TRAUMA SURGERY | Facility: HOSPITAL | Age: 71
End: 2021-06-15
Payer: MEDICAID

## 2021-06-15 VITALS
BODY MASS INDEX: 43.39 KG/M2 | HEART RATE: 88 BPM | HEIGHT: 60 IN | DIASTOLIC BLOOD PRESSURE: 71 MMHG | SYSTOLIC BLOOD PRESSURE: 142 MMHG | TEMPERATURE: 97.3 F | WEIGHT: 221 LBS

## 2021-06-15 DIAGNOSIS — K80.20 CALCULUS OF GALLBLADDER W/OUT CHOLECYSTITIS W/OUT OBSTRUCTION: ICD-10-CM

## 2021-06-15 PROCEDURE — 99203 OFFICE O/P NEW LOW 30 MIN: CPT

## 2021-08-08 PROBLEM — K80.20 SYMPTOMATIC CHOLELITHIASIS: Status: ACTIVE | Noted: 2021-08-08

## 2021-08-08 NOTE — ASSESSMENT
[FreeTextEntry1] : Recently discharged from [facility] with acute cholecystitis\par \par Treated with IV antibiotics, \par He was scheduled for in-patient cholecystectomy and subsequently medical cleared with moderate risk stratification.  Patient eventually opted for non-operatively treatment..  He completed oral antibiotics regimen\par \par Without complaints\par Denies fever, abdominal pain, nausea and vomiting\par Tolerating diet\par No change in bowel movement\par \par AOX3 not in distress\par Anicteric sclera\par Nontender, no Escalante's sign nondistended no palpable masses\par \par Symptomatic cholelithiasis\par Prognosis including recurrence and complications like choledocholithiasis and pancreatitis \par Elective laparoscopic cholecystectomy offered\par Patient to decide and get back to us\par Instructed to follow up for fever, jaundice, abd pain\par

## 2022-01-24 NOTE — PROGRESS NOTE ADULT - PROBLEM SELECTOR PLAN 5
Add 16562 Cpt? (Important Note: In 2017 The Use Of 20148 Is Being Tracked By Cms To Determine Future Global Period Reimbursement For Global Periods): no Detail Level: Detailed lovenox vte ppx

## 2022-03-16 NOTE — PATIENT PROFILE ADULT - HAS THE PATIENT BEEN ADMITTED FROM SHORT TERM REHAB?
Quality 110: Preventive Care And Screening: Influenza Immunization: Influenza immunization was not ordered or administered, reason not given Detail Level: Detailed no

## 2022-04-22 ENCOUNTER — TRANSCRIPTION ENCOUNTER (OUTPATIENT)
Age: 72
End: 2022-04-22

## 2023-05-08 NOTE — PROGRESS NOTE ADULT - REASON FOR ADMISSION
gout flare
Methotrexate Pregnancy And Lactation Text: This medication is Pregnancy Category X and is known to cause fetal harm. This medication is excreted in breast milk.

## 2023-08-10 NOTE — H&P ADULT - FAMILY HISTORY
Chronic, stable  - continue home hydrocortisone   No pertinent family history in first degree relatives

## 2023-09-07 NOTE — DISCHARGE NOTE PROVIDER - NSDCFUADDAPPT_GEN_ALL_CORE_FT
1. Within 1 week of discharge, please schedule an appointment with a provider at the [facility]. There, you may establish care with a PMD who can continue to manage your health. We have provided the office with your contact information, but if you do not receive a phone call by Monday, please call [phone] to schedule an appointment. In addition, please be aware that you were denied home services, such as physical therapy because you did not have a PMD. After establishing care with a provider, our staff can help you get set up with home physical therapy, as well as any other services that you may need.    2. Please follow up with a rheumatologist within 1 week of discharge. We have provided the office with your contact information, but if you do not receive a phone call by Monday, please call [phone] to schedule an appointment. Please continue to follow with the rheumatologists, as they will be managing your gout. Unknown

## 2023-11-06 NOTE — PHYSICAL THERAPY INITIAL EVALUATION ADULT - BALANCE DISTURBANCE, IDENTIFIED IMPAIRMENT CONTRIBUTE, REHAB EVAL
I was present and supervised treating assistant. Notes were reviewed and care agreed upon.  Nae Mckeon, PT       pain/decreased strength/impaired coordination

## 2025-01-07 NOTE — DISCHARGE NOTE ADULT - NSTOBACCOREFERRAL_GEN_A_CS
Today the left shoulder was injected with cortisone.  Also the right knee was injected with cortisone.  Go home and rest.  Apply ice to either the shoulder or knee if you need to.  He can also apply over-the-counter diclofenac gel or Aspercreme 2-3 times a day to the same areas.  For mild pain take extra strength Tylenol 500 mg 1 or 2 twice a day.  For more severe pain take tramadol 50 mg 1 every 6 hours.  Do not use any ibuprofen, Aleve, or aspirin since you are on Xarelto as a blood thinner.  Return to office for recheck in 3 months.  
Patient declined information

## 2025-03-17 NOTE — PROGRESS NOTE ADULT - PROBLEM SELECTOR PROBLEM 1
Please enter lab orders for the patient's upcoming physical appointment.     Physical scheduled:   Your appointments       Date & Time Appointment Department ([place])    Apr 14, 2025 10:30 AM CDT Medicare Annual Well Visit with Leticia Gray MD [facility] ([facility]              [facility]  [address] [place] [address] [place] [id number]  [phone]           Preferred lab: [facility] ([facility])     The patient has been notified to complete fasting labs prior to their physical appointment.     
Acute right ankle pain
Left knee pain
Acute pain of left knee
Acute pain of left knee
Gouty arthritis
